# Patient Record
Sex: MALE | Race: WHITE | NOT HISPANIC OR LATINO | URBAN - METROPOLITAN AREA
[De-identification: names, ages, dates, MRNs, and addresses within clinical notes are randomized per-mention and may not be internally consistent; named-entity substitution may affect disease eponyms.]

---

## 2017-04-22 ENCOUNTER — EMERGENCY (EMERGENCY)
Facility: HOSPITAL | Age: 67
LOS: 1 days | Discharge: HOME | End: 2017-04-22
Admitting: EMERGENCY MEDICINE
Payer: COMMERCIAL

## 2017-04-22 PROCEDURE — 70450 CT HEAD/BRAIN W/O DYE: CPT

## 2017-04-22 PROCEDURE — 70450 CT HEAD/BRAIN W/O DYE: CPT | Mod: 26

## 2017-04-22 PROCEDURE — 99284 EMERGENCY DEPT VISIT MOD MDM: CPT | Mod: 25

## 2017-04-22 PROCEDURE — 73660 X-RAY EXAM OF TOE(S): CPT | Mod: 26,RT

## 2017-04-22 PROCEDURE — 99284 EMERGENCY DEPT VISIT MOD MDM: CPT

## 2017-04-22 PROCEDURE — 73080 X-RAY EXAM OF ELBOW: CPT | Mod: 26,LT

## 2017-04-22 PROCEDURE — 90471 IMMUNIZATION ADMIN: CPT

## 2017-04-22 PROCEDURE — 73080 X-RAY EXAM OF ELBOW: CPT

## 2017-04-22 PROCEDURE — 73660 X-RAY EXAM OF TOE(S): CPT

## 2017-04-24 ENCOUNTER — EMERGENCY (EMERGENCY)
Facility: HOSPITAL | Age: 67
LOS: 1 days | Discharge: HOME | End: 2017-04-24
Admitting: EMERGENCY MEDICINE
Payer: COMMERCIAL

## 2017-04-24 PROCEDURE — 93010 ELECTROCARDIOGRAM REPORT: CPT

## 2017-04-24 PROCEDURE — 99284 EMERGENCY DEPT VISIT MOD MDM: CPT | Mod: 25

## 2017-04-25 PROCEDURE — 80048 BASIC METABOLIC PNL TOTAL CA: CPT

## 2017-04-25 PROCEDURE — 96360 HYDRATION IV INFUSION INIT: CPT

## 2017-04-25 PROCEDURE — 93005 ELECTROCARDIOGRAM TRACING: CPT

## 2017-04-25 PROCEDURE — 99285 EMERGENCY DEPT VISIT HI MDM: CPT | Mod: 25

## 2017-04-25 PROCEDURE — 80307 DRUG TEST PRSMV CHEM ANLYZR: CPT

## 2017-04-25 PROCEDURE — 85027 COMPLETE CBC AUTOMATED: CPT

## 2018-02-14 ENCOUNTER — APPOINTMENT (OUTPATIENT)
Dept: ORTHOPEDIC SURGERY | Facility: CLINIC | Age: 68
End: 2018-02-14
Payer: MEDICARE

## 2018-02-14 PROCEDURE — 99203 OFFICE O/P NEW LOW 30 MIN: CPT

## 2018-02-15 ENCOUNTER — APPOINTMENT (OUTPATIENT)
Dept: MRI IMAGING | Facility: IMAGING CENTER | Age: 68
End: 2018-02-15
Payer: MEDICARE

## 2018-02-15 ENCOUNTER — OUTPATIENT (OUTPATIENT)
Dept: OUTPATIENT SERVICES | Facility: HOSPITAL | Age: 68
LOS: 1 days | End: 2018-02-15
Payer: MEDICARE

## 2018-02-15 DIAGNOSIS — Z00.00 ENCOUNTER FOR GENERAL ADULT MEDICAL EXAMINATION WITHOUT ABNORMAL FINDINGS: ICD-10-CM

## 2018-02-15 PROCEDURE — 72146 MRI CHEST SPINE W/O DYE: CPT | Mod: 26

## 2018-02-15 PROCEDURE — 72146 MRI CHEST SPINE W/O DYE: CPT

## 2018-02-15 PROCEDURE — 72141 MRI NECK SPINE W/O DYE: CPT | Mod: 26

## 2018-02-15 PROCEDURE — 72141 MRI NECK SPINE W/O DYE: CPT

## 2018-02-21 ENCOUNTER — APPOINTMENT (OUTPATIENT)
Dept: ORTHOPEDIC SURGERY | Facility: CLINIC | Age: 68
End: 2018-02-21
Payer: MEDICARE

## 2018-02-21 PROCEDURE — 99214 OFFICE O/P EST MOD 30 MIN: CPT

## 2018-02-28 ENCOUNTER — OUTPATIENT (OUTPATIENT)
Dept: OUTPATIENT SERVICES | Facility: HOSPITAL | Age: 68
LOS: 1 days | End: 2018-02-28
Payer: MEDICARE

## 2018-02-28 VITALS
RESPIRATION RATE: 16 BRPM | HEART RATE: 68 BPM | TEMPERATURE: 99 F | SYSTOLIC BLOOD PRESSURE: 122 MMHG | WEIGHT: 242.51 LBS | HEIGHT: 77 IN | OXYGEN SATURATION: 98 % | DIASTOLIC BLOOD PRESSURE: 72 MMHG

## 2018-02-28 DIAGNOSIS — Z98.890 OTHER SPECIFIED POSTPROCEDURAL STATES: Chronic | ICD-10-CM

## 2018-02-28 DIAGNOSIS — R06.09 OTHER FORMS OF DYSPNEA: ICD-10-CM

## 2018-02-28 DIAGNOSIS — M48.02 SPINAL STENOSIS, CERVICAL REGION: ICD-10-CM

## 2018-02-28 DIAGNOSIS — G47.33 OBSTRUCTIVE SLEEP APNEA (ADULT) (PEDIATRIC): ICD-10-CM

## 2018-02-28 LAB
ALBUMIN SERPL ELPH-MCNC: 4.3 G/DL — SIGNIFICANT CHANGE UP (ref 3.3–5)
ALP SERPL-CCNC: 88 U/L — SIGNIFICANT CHANGE UP (ref 40–120)
ALT FLD-CCNC: 14 U/L — SIGNIFICANT CHANGE UP (ref 4–41)
AST SERPL-CCNC: 20 U/L — SIGNIFICANT CHANGE UP (ref 4–40)
BILIRUB SERPL-MCNC: 1 MG/DL — SIGNIFICANT CHANGE UP (ref 0.2–1.2)
BLD GP AB SCN SERPL QL: NEGATIVE — SIGNIFICANT CHANGE UP
BUN SERPL-MCNC: 33 MG/DL — HIGH (ref 7–23)
CALCIUM SERPL-MCNC: 9.6 MG/DL — SIGNIFICANT CHANGE UP (ref 8.4–10.5)
CHLORIDE SERPL-SCNC: 92 MMOL/L — LOW (ref 98–107)
CO2 SERPL-SCNC: 23 MMOL/L — SIGNIFICANT CHANGE UP (ref 22–31)
CREAT SERPL-MCNC: 1.41 MG/DL — HIGH (ref 0.5–1.3)
GLUCOSE SERPL-MCNC: 86 MG/DL — SIGNIFICANT CHANGE UP (ref 70–99)
HCT VFR BLD CALC: 39.7 % — SIGNIFICANT CHANGE UP (ref 39–50)
HGB BLD-MCNC: 14.5 G/DL — SIGNIFICANT CHANGE UP (ref 13–17)
MCHC RBC-ENTMCNC: 36.2 PG — HIGH (ref 27–34)
MCHC RBC-ENTMCNC: 36.5 % — HIGH (ref 32–36)
MCV RBC AUTO: 99 FL — SIGNIFICANT CHANGE UP (ref 80–100)
NRBC # FLD: 0 — SIGNIFICANT CHANGE UP
PLATELET # BLD AUTO: 249 K/UL — SIGNIFICANT CHANGE UP (ref 150–400)
PMV BLD: 10.7 FL — SIGNIFICANT CHANGE UP (ref 7–13)
POTASSIUM SERPL-MCNC: 3.4 MMOL/L — LOW (ref 3.5–5.3)
POTASSIUM SERPL-SCNC: 3.4 MMOL/L — LOW (ref 3.5–5.3)
PROT SERPL-MCNC: 7.3 G/DL — SIGNIFICANT CHANGE UP (ref 6–8.3)
RBC # BLD: 4.01 M/UL — LOW (ref 4.2–5.8)
RBC # FLD: 13 % — SIGNIFICANT CHANGE UP (ref 10.3–14.5)
RH IG SCN BLD-IMP: POSITIVE — SIGNIFICANT CHANGE UP
SODIUM SERPL-SCNC: 134 MMOL/L — LOW (ref 135–145)
WBC # BLD: 12.05 K/UL — HIGH (ref 3.8–10.5)
WBC # FLD AUTO: 12.05 K/UL — HIGH (ref 3.8–10.5)

## 2018-02-28 PROCEDURE — 93010 ELECTROCARDIOGRAM REPORT: CPT

## 2018-02-28 NOTE — H&P PST ADULT - PSH
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

## 2018-02-28 NOTE — H&P PST ADULT - PROBLEM SELECTOR PLAN 2
Instructed pt. to notify his Cardiologist.  Pt. is already scheduled for a routine visit with his Cardiologist 3/2/18.  Instructed pt. that he will need cardiac clearance.  Contacted Dotty at the Cardiologist office and informed her that the visit will have to also be for cardiac clearance on 3/2/18.

## 2018-02-28 NOTE — H&P PST ADULT - PMH
Atrial fibrillation    Hyperlipidemia    Hypertension Atrial fibrillation    Cervical stenosis of spine    Hyperlipidemia    Hypertension

## 2018-02-28 NOTE — H&P PST ADULT - VISION (WITH CORRECTIVE LENSES IF THE PATIENT USUALLY WEARS THEM):
Normal vision: sees adequately in most situations; can see medication labels, newsprint/contact lens at this visit instructed to wear glasses

## 2018-02-28 NOTE — H&P PST ADULT - PROBLEM SELECTOR PLAN 1
Pt. is scheduled for a C4-6 cervical laminectomy and fusion 3/6/18.  Pt. had medical clearance today, 2/28/18.

## 2018-02-28 NOTE — H&P PST ADULT - MUSCULOSKELETAL COMMENTS
"I don't have any neck pain", "all of my pain is in the lumbar area but the stenosis in my neck could be contributing to the lower back pain"

## 2018-02-28 NOTE — H&P PST ADULT - NSANTHOSAYNRD_GEN_A_CORE
No. RUDDY screening performed.  STOP BANG Legend: 0-2 = LOW Risk; 3-4 = INTERMEDIATE Risk; 5-8 = HIGH Risk

## 2018-03-01 LAB — SPECIMEN SOURCE: SIGNIFICANT CHANGE UP

## 2018-03-03 LAB — BACTERIA NPH CULT: SIGNIFICANT CHANGE UP

## 2018-03-05 NOTE — ASU PATIENT PROFILE, ADULT - VISION (WITH CORRECTIVE LENSES IF THE PATIENT USUALLY WEARS THEM):
contact lens at this visit instructed to wear glasses/Normal vision: sees adequately in most situations; can see medication labels, newsprint

## 2018-03-06 ENCOUNTER — INPATIENT (INPATIENT)
Facility: HOSPITAL | Age: 68
LOS: 2 days | Discharge: HOME HEALTH | End: 2018-03-09
Attending: STUDENT IN AN ORGANIZED HEALTH CARE EDUCATION/TRAINING PROGRAM | Admitting: STUDENT IN AN ORGANIZED HEALTH CARE EDUCATION/TRAINING PROGRAM
Payer: MEDICARE

## 2018-03-06 ENCOUNTER — TRANSCRIPTION ENCOUNTER (OUTPATIENT)
Age: 68
End: 2018-03-06

## 2018-03-06 ENCOUNTER — APPOINTMENT (OUTPATIENT)
Dept: ORTHOPEDIC SURGERY | Facility: HOSPITAL | Age: 68
End: 2018-03-06
Payer: MEDICARE

## 2018-03-06 VITALS
TEMPERATURE: 99 F | DIASTOLIC BLOOD PRESSURE: 69 MMHG | RESPIRATION RATE: 18 BRPM | HEIGHT: 77 IN | WEIGHT: 242.51 LBS | SYSTOLIC BLOOD PRESSURE: 130 MMHG | OXYGEN SATURATION: 97 % | HEART RATE: 74 BPM

## 2018-03-06 DIAGNOSIS — Z98.890 OTHER SPECIFIED POSTPROCEDURAL STATES: Chronic | ICD-10-CM

## 2018-03-06 DIAGNOSIS — M48.02 SPINAL STENOSIS, CERVICAL REGION: ICD-10-CM

## 2018-03-06 LAB
BASOPHILS # BLD AUTO: 0.03 K/UL — SIGNIFICANT CHANGE UP (ref 0–0.2)
BASOPHILS NFR BLD AUTO: 0.2 % — SIGNIFICANT CHANGE UP (ref 0–2)
BUN SERPL-MCNC: 24 MG/DL — HIGH (ref 7–23)
CALCIUM SERPL-MCNC: 9.1 MG/DL — SIGNIFICANT CHANGE UP (ref 8.4–10.5)
CHLORIDE SERPL-SCNC: 93 MMOL/L — LOW (ref 98–107)
CO2 SERPL-SCNC: 23 MMOL/L — SIGNIFICANT CHANGE UP (ref 22–31)
CREAT SERPL-MCNC: 1.4 MG/DL — HIGH (ref 0.5–1.3)
EOSINOPHIL # BLD AUTO: 0.01 K/UL — SIGNIFICANT CHANGE UP (ref 0–0.5)
EOSINOPHIL NFR BLD AUTO: 0.1 % — SIGNIFICANT CHANGE UP (ref 0–6)
GLUCOSE SERPL-MCNC: 127 MG/DL — HIGH (ref 70–99)
HCT VFR BLD CALC: 37.1 % — LOW (ref 39–50)
HGB BLD-MCNC: 13.1 G/DL — SIGNIFICANT CHANGE UP (ref 13–17)
IMM GRANULOCYTES # BLD AUTO: 0.15 # — SIGNIFICANT CHANGE UP
IMM GRANULOCYTES NFR BLD AUTO: 1.2 % — SIGNIFICANT CHANGE UP (ref 0–1.5)
LYMPHOCYTES # BLD AUTO: 0.76 K/UL — LOW (ref 1–3.3)
LYMPHOCYTES # BLD AUTO: 5.9 % — LOW (ref 13–44)
MCHC RBC-ENTMCNC: 34.9 PG — HIGH (ref 27–34)
MCHC RBC-ENTMCNC: 35.3 % — SIGNIFICANT CHANGE UP (ref 32–36)
MCV RBC AUTO: 98.9 FL — SIGNIFICANT CHANGE UP (ref 80–100)
MONOCYTES # BLD AUTO: 0.69 K/UL — SIGNIFICANT CHANGE UP (ref 0–0.9)
MONOCYTES NFR BLD AUTO: 5.3 % — SIGNIFICANT CHANGE UP (ref 2–14)
NEUTROPHILS # BLD AUTO: 11.3 K/UL — HIGH (ref 1.8–7.4)
NEUTROPHILS NFR BLD AUTO: 87.3 % — HIGH (ref 43–77)
NRBC # FLD: 0 — SIGNIFICANT CHANGE UP
PLATELET # BLD AUTO: 205 K/UL — SIGNIFICANT CHANGE UP (ref 150–400)
PMV BLD: 10.5 FL — SIGNIFICANT CHANGE UP (ref 7–13)
POTASSIUM SERPL-MCNC: 3.8 MMOL/L — SIGNIFICANT CHANGE UP (ref 3.5–5.3)
POTASSIUM SERPL-SCNC: 3.8 MMOL/L — SIGNIFICANT CHANGE UP (ref 3.5–5.3)
RBC # BLD: 3.75 M/UL — LOW (ref 4.2–5.8)
RBC # FLD: 12.7 % — SIGNIFICANT CHANGE UP (ref 10.3–14.5)
RH IG SCN BLD-IMP: POSITIVE — SIGNIFICANT CHANGE UP
SODIUM SERPL-SCNC: 134 MMOL/L — LOW (ref 135–145)
WBC # BLD: 12.94 K/UL — HIGH (ref 3.8–10.5)
WBC # FLD AUTO: 12.94 K/UL — HIGH (ref 3.8–10.5)

## 2018-03-06 PROCEDURE — 63045 LAM FACETEC & FORAMOT CRV: CPT | Mod: 82

## 2018-03-06 PROCEDURE — 22600 ARTHRD PST TQ 1NTRSPC CRV: CPT | Mod: 82

## 2018-03-06 PROCEDURE — ZZZZZ: CPT

## 2018-03-06 PROCEDURE — 22600 ARTHRD PST TQ 1NTRSPC CRV: CPT

## 2018-03-06 PROCEDURE — 22842 INSERT SPINE FIXATION DEVICE: CPT

## 2018-03-06 PROCEDURE — 22842 INSERT SPINE FIXATION DEVICE: CPT | Mod: 82

## 2018-03-06 PROCEDURE — 63048 LAM FACETEC &FORAMOT EA ADDL: CPT | Mod: 82

## 2018-03-06 PROCEDURE — 63045 LAM FACETEC & FORAMOT CRV: CPT

## 2018-03-06 PROCEDURE — 22614 ARTHRD PST TQ 1NTRSPC EA ADD: CPT | Mod: 82

## 2018-03-06 PROCEDURE — 63048 LAM FACETEC &FORAMOT EA ADDL: CPT

## 2018-03-06 PROCEDURE — 22614 ARTHRD PST TQ 1NTRSPC EA ADD: CPT

## 2018-03-06 RX ORDER — FOLIC ACID 0.8 MG
1 TABLET ORAL DAILY
Qty: 0 | Refills: 0 | Status: DISCONTINUED | OUTPATIENT
Start: 2018-03-06 | End: 2018-03-09

## 2018-03-06 RX ORDER — ONDANSETRON 8 MG/1
4 TABLET, FILM COATED ORAL EVERY 6 HOURS
Qty: 0 | Refills: 0 | Status: DISCONTINUED | OUTPATIENT
Start: 2018-03-06 | End: 2018-03-09

## 2018-03-06 RX ORDER — CHLORTHALIDONE 50 MG
25 TABLET ORAL DAILY
Qty: 0 | Refills: 0 | Status: DISCONTINUED | OUTPATIENT
Start: 2018-03-06 | End: 2018-03-09

## 2018-03-06 RX ORDER — LISINOPRIL 2.5 MG/1
40 TABLET ORAL DAILY
Qty: 0 | Refills: 0 | Status: DISCONTINUED | OUTPATIENT
Start: 2018-03-06 | End: 2018-03-09

## 2018-03-06 RX ORDER — HYDROMORPHONE HYDROCHLORIDE 2 MG/ML
0.5 INJECTION INTRAMUSCULAR; INTRAVENOUS; SUBCUTANEOUS
Qty: 0 | Refills: 0 | Status: DISCONTINUED | OUTPATIENT
Start: 2018-03-06 | End: 2018-03-07

## 2018-03-06 RX ORDER — CEFAZOLIN SODIUM 1 G
2000 VIAL (EA) INJECTION EVERY 8 HOURS
Qty: 0 | Refills: 0 | Status: COMPLETED | OUTPATIENT
Start: 2018-03-06 | End: 2018-03-07

## 2018-03-06 RX ORDER — MAGNESIUM HYDROXIDE 400 MG/1
30 TABLET, CHEWABLE ORAL EVERY 12 HOURS
Qty: 0 | Refills: 0 | Status: DISCONTINUED | OUTPATIENT
Start: 2018-03-06 | End: 2018-03-09

## 2018-03-06 RX ORDER — SODIUM CHLORIDE 9 MG/ML
1000 INJECTION, SOLUTION INTRAVENOUS
Qty: 0 | Refills: 0 | Status: DISCONTINUED | OUTPATIENT
Start: 2018-03-06 | End: 2018-03-08

## 2018-03-06 RX ORDER — OXYCODONE HYDROCHLORIDE 5 MG/1
5 TABLET ORAL EVERY 4 HOURS
Qty: 0 | Refills: 0 | Status: DISCONTINUED | OUTPATIENT
Start: 2018-03-06 | End: 2018-03-07

## 2018-03-06 RX ORDER — ONDANSETRON 8 MG/1
4 TABLET, FILM COATED ORAL ONCE
Qty: 0 | Refills: 0 | Status: DISCONTINUED | OUTPATIENT
Start: 2018-03-06 | End: 2018-03-07

## 2018-03-06 RX ORDER — AMLODIPINE BESYLATE 2.5 MG/1
10 TABLET ORAL DAILY
Qty: 0 | Refills: 0 | Status: DISCONTINUED | OUTPATIENT
Start: 2018-03-06 | End: 2018-03-09

## 2018-03-06 RX ORDER — FAMOTIDINE 10 MG/ML
20 INJECTION INTRAVENOUS EVERY 24 HOURS
Qty: 0 | Refills: 0 | Status: DISCONTINUED | OUTPATIENT
Start: 2018-03-06 | End: 2018-03-09

## 2018-03-06 RX ORDER — DRONEDARONE 400 MG/1
400 TABLET, FILM COATED ORAL
Qty: 0 | Refills: 0 | Status: DISCONTINUED | OUTPATIENT
Start: 2018-03-06 | End: 2018-03-09

## 2018-03-06 RX ORDER — HYDROMORPHONE HYDROCHLORIDE 2 MG/ML
30 INJECTION INTRAMUSCULAR; INTRAVENOUS; SUBCUTANEOUS
Qty: 0 | Refills: 0 | Status: DISCONTINUED | OUTPATIENT
Start: 2018-03-06 | End: 2018-03-07

## 2018-03-06 RX ORDER — NALOXONE HYDROCHLORIDE 4 MG/.1ML
0.1 SPRAY NASAL
Qty: 0 | Refills: 0 | Status: DISCONTINUED | OUTPATIENT
Start: 2018-03-06 | End: 2018-03-09

## 2018-03-06 RX ORDER — DIAZEPAM 5 MG
5 TABLET ORAL ONCE
Qty: 0 | Refills: 0 | Status: DISCONTINUED | OUTPATIENT
Start: 2018-03-06 | End: 2018-03-06

## 2018-03-06 RX ORDER — ACETAMINOPHEN 500 MG
650 TABLET ORAL EVERY 6 HOURS
Qty: 0 | Refills: 0 | Status: DISCONTINUED | OUTPATIENT
Start: 2018-03-06 | End: 2018-03-09

## 2018-03-06 RX ORDER — SENNA PLUS 8.6 MG/1
2 TABLET ORAL AT BEDTIME
Qty: 0 | Refills: 0 | Status: DISCONTINUED | OUTPATIENT
Start: 2018-03-06 | End: 2018-03-09

## 2018-03-06 RX ORDER — ACETAMINOPHEN 500 MG
650 TABLET ORAL EVERY 6 HOURS
Qty: 0 | Refills: 0 | Status: DISCONTINUED | OUTPATIENT
Start: 2018-03-06 | End: 2018-03-07

## 2018-03-06 RX ORDER — DIPHENHYDRAMINE HCL 50 MG
25 CAPSULE ORAL EVERY 4 HOURS
Qty: 0 | Refills: 0 | Status: DISCONTINUED | OUTPATIENT
Start: 2018-03-06 | End: 2018-03-07

## 2018-03-06 RX ORDER — DOCUSATE SODIUM 100 MG
100 CAPSULE ORAL THREE TIMES A DAY
Qty: 0 | Refills: 0 | Status: DISCONTINUED | OUTPATIENT
Start: 2018-03-06 | End: 2018-03-09

## 2018-03-06 RX ORDER — OXYCODONE HYDROCHLORIDE 5 MG/1
10 TABLET ORAL EVERY 4 HOURS
Qty: 0 | Refills: 0 | Status: DISCONTINUED | OUTPATIENT
Start: 2018-03-06 | End: 2018-03-07

## 2018-03-06 RX ORDER — METOPROLOL TARTRATE 50 MG
50 TABLET ORAL
Qty: 0 | Refills: 0 | Status: DISCONTINUED | OUTPATIENT
Start: 2018-03-06 | End: 2018-03-09

## 2018-03-06 RX ADMIN — HYDROMORPHONE HYDROCHLORIDE 0.5 MILLIGRAM(S): 2 INJECTION INTRAMUSCULAR; INTRAVENOUS; SUBCUTANEOUS at 16:20

## 2018-03-06 RX ADMIN — HYDROMORPHONE HYDROCHLORIDE 0.5 MILLIGRAM(S): 2 INJECTION INTRAMUSCULAR; INTRAVENOUS; SUBCUTANEOUS at 16:55

## 2018-03-06 RX ADMIN — HYDROMORPHONE HYDROCHLORIDE 30 MILLILITER(S): 2 INJECTION INTRAMUSCULAR; INTRAVENOUS; SUBCUTANEOUS at 23:20

## 2018-03-06 RX ADMIN — HYDROMORPHONE HYDROCHLORIDE 0.5 MILLIGRAM(S): 2 INJECTION INTRAMUSCULAR; INTRAVENOUS; SUBCUTANEOUS at 18:45

## 2018-03-06 RX ADMIN — OXYCODONE HYDROCHLORIDE 10 MILLIGRAM(S): 5 TABLET ORAL at 22:15

## 2018-03-06 RX ADMIN — Medication 1 TABLET(S): at 21:45

## 2018-03-06 RX ADMIN — SODIUM CHLORIDE 75 MILLILITER(S): 9 INJECTION, SOLUTION INTRAVENOUS at 16:21

## 2018-03-06 RX ADMIN — Medication 100 MILLIGRAM(S): at 23:25

## 2018-03-06 RX ADMIN — HYDROMORPHONE HYDROCHLORIDE 0.5 MILLIGRAM(S): 2 INJECTION INTRAMUSCULAR; INTRAVENOUS; SUBCUTANEOUS at 16:35

## 2018-03-06 RX ADMIN — HYDROMORPHONE HYDROCHLORIDE 0.5 MILLIGRAM(S): 2 INJECTION INTRAMUSCULAR; INTRAVENOUS; SUBCUTANEOUS at 18:00

## 2018-03-06 RX ADMIN — DRONEDARONE 400 MILLIGRAM(S): 400 TABLET, FILM COATED ORAL at 21:45

## 2018-03-06 RX ADMIN — Medication 5 MILLIGRAM(S): at 17:20

## 2018-03-06 RX ADMIN — HYDROMORPHONE HYDROCHLORIDE 0.5 MILLIGRAM(S): 2 INJECTION INTRAMUSCULAR; INTRAVENOUS; SUBCUTANEOUS at 18:15

## 2018-03-06 RX ADMIN — HYDROMORPHONE HYDROCHLORIDE 0.5 MILLIGRAM(S): 2 INJECTION INTRAMUSCULAR; INTRAVENOUS; SUBCUTANEOUS at 18:30

## 2018-03-06 RX ADMIN — Medication 50 MILLIGRAM(S): at 20:06

## 2018-03-06 RX ADMIN — HYDROMORPHONE HYDROCHLORIDE 0.5 MILLIGRAM(S): 2 INJECTION INTRAMUSCULAR; INTRAVENOUS; SUBCUTANEOUS at 16:40

## 2018-03-06 RX ADMIN — OXYCODONE HYDROCHLORIDE 10 MILLIGRAM(S): 5 TABLET ORAL at 21:45

## 2018-03-06 NOTE — BRIEF OPERATIVE NOTE - PROCEDURE
<<-----Click on this checkbox to enter Procedure Cervical arthrodesis by posterior technique  03/06/2018    Active  [id number]

## 2018-03-06 NOTE — PROGRESS NOTE ADULT - SUBJECTIVE AND OBJECTIVE BOX
Patient appears well recovered from anesthesia. Pain well controlled with pain medications.     Vital Signs Last 24 Hrs  T(C): 36.6 (06 Mar 2018 19:10), Max: 37 (06 Mar 2018 10:43)  T(F): 97.8 (06 Mar 2018 19:10), Max: 98.6 (06 Mar 2018 10:43)  HR: 82 (06 Mar 2018 22:00) (74 - 96)  BP: 119/59 (06 Mar 2018 22:00) (119/59 - 146/87)  BP(mean): 71 (06 Mar 2018 22:00) (71 - 75)  RR: 14 (06 Mar 2018 22:00) (10 - 19)  SpO2: 100% (06 Mar 2018 22:00) (97% - 100%)    Exam:  Gen: NAD  Motor: 5/5 AIN/PIN/Median/Radial/Ulnar nerve distributions  Sensory: SILT Median/Radial/Ulnar nerve distributions  Vascular: 2+ Radial pulse  Dressing: Clean, Dry, Intact    A/P: 67 year old male s/p C3-6 PSF  - Pain Control  - Regular Diet  - PT/OT, OOB  - Monitor HV

## 2018-03-07 ENCOUNTER — TRANSCRIPTION ENCOUNTER (OUTPATIENT)
Age: 68
End: 2018-03-07

## 2018-03-07 LAB
BASOPHILS # BLD AUTO: 0.01 K/UL — SIGNIFICANT CHANGE UP (ref 0–0.2)
BASOPHILS NFR BLD AUTO: 0.1 % — SIGNIFICANT CHANGE UP (ref 0–2)
BUN SERPL-MCNC: 18 MG/DL — SIGNIFICANT CHANGE UP (ref 7–23)
CALCIUM SERPL-MCNC: 9.1 MG/DL — SIGNIFICANT CHANGE UP (ref 8.4–10.5)
CHLORIDE SERPL-SCNC: 92 MMOL/L — LOW (ref 98–107)
CO2 SERPL-SCNC: 27 MMOL/L — SIGNIFICANT CHANGE UP (ref 22–31)
CREAT SERPL-MCNC: 0.93 MG/DL — SIGNIFICANT CHANGE UP (ref 0.5–1.3)
EOSINOPHIL # BLD AUTO: 0 K/UL — SIGNIFICANT CHANGE UP (ref 0–0.5)
EOSINOPHIL NFR BLD AUTO: 0 % — SIGNIFICANT CHANGE UP (ref 0–6)
GLUCOSE SERPL-MCNC: 168 MG/DL — HIGH (ref 70–99)
HCT VFR BLD CALC: 36.6 % — LOW (ref 39–50)
HGB BLD-MCNC: 12.9 G/DL — LOW (ref 13–17)
IMM GRANULOCYTES # BLD AUTO: 0.11 # — SIGNIFICANT CHANGE UP
IMM GRANULOCYTES NFR BLD AUTO: 0.8 % — SIGNIFICANT CHANGE UP (ref 0–1.5)
LYMPHOCYTES # BLD AUTO: 0.48 K/UL — LOW (ref 1–3.3)
LYMPHOCYTES # BLD AUTO: 3.5 % — LOW (ref 13–44)
MCHC RBC-ENTMCNC: 34.9 PG — HIGH (ref 27–34)
MCHC RBC-ENTMCNC: 35.2 % — SIGNIFICANT CHANGE UP (ref 32–36)
MCV RBC AUTO: 98.9 FL — SIGNIFICANT CHANGE UP (ref 80–100)
MONOCYTES # BLD AUTO: 1.11 K/UL — HIGH (ref 0–0.9)
MONOCYTES NFR BLD AUTO: 8.2 % — SIGNIFICANT CHANGE UP (ref 2–14)
NEUTROPHILS # BLD AUTO: 11.89 K/UL — HIGH (ref 1.8–7.4)
NEUTROPHILS NFR BLD AUTO: 87.4 % — HIGH (ref 43–77)
NRBC # FLD: 0 — SIGNIFICANT CHANGE UP
PLATELET # BLD AUTO: 186 K/UL — SIGNIFICANT CHANGE UP (ref 150–400)
PMV BLD: 10.5 FL — SIGNIFICANT CHANGE UP (ref 7–13)
POTASSIUM SERPL-MCNC: 3.7 MMOL/L — SIGNIFICANT CHANGE UP (ref 3.5–5.3)
POTASSIUM SERPL-SCNC: 3.7 MMOL/L — SIGNIFICANT CHANGE UP (ref 3.5–5.3)
RBC # BLD: 3.7 M/UL — LOW (ref 4.2–5.8)
RBC # FLD: 12.6 % — SIGNIFICANT CHANGE UP (ref 10.3–14.5)
SODIUM SERPL-SCNC: 134 MMOL/L — LOW (ref 135–145)
WBC # BLD: 13.6 K/UL — HIGH (ref 3.8–10.5)
WBC # FLD AUTO: 13.6 K/UL — HIGH (ref 3.8–10.5)

## 2018-03-07 RX ORDER — OXYCODONE HYDROCHLORIDE 5 MG/1
10 TABLET ORAL
Qty: 0 | Refills: 0 | Status: DISCONTINUED | OUTPATIENT
Start: 2018-03-07 | End: 2018-03-09

## 2018-03-07 RX ORDER — ACETAMINOPHEN 500 MG
650 TABLET ORAL EVERY 6 HOURS
Qty: 0 | Refills: 0 | Status: COMPLETED | OUTPATIENT
Start: 2018-03-07 | End: 2018-03-09

## 2018-03-07 RX ORDER — DIAZEPAM 5 MG
5 TABLET ORAL EVERY 6 HOURS
Qty: 0 | Refills: 0 | Status: DISCONTINUED | OUTPATIENT
Start: 2018-03-07 | End: 2018-03-09

## 2018-03-07 RX ORDER — OXYCODONE HYDROCHLORIDE 5 MG/1
5 TABLET ORAL
Qty: 0 | Refills: 0 | Status: DISCONTINUED | OUTPATIENT
Start: 2018-03-07 | End: 2018-03-09

## 2018-03-07 RX ADMIN — OXYCODONE HYDROCHLORIDE 10 MILLIGRAM(S): 5 TABLET ORAL at 19:25

## 2018-03-07 RX ADMIN — Medication 650 MILLIGRAM(S): at 12:30

## 2018-03-07 RX ADMIN — AMLODIPINE BESYLATE 10 MILLIGRAM(S): 2.5 TABLET ORAL at 06:01

## 2018-03-07 RX ADMIN — Medication 1 TABLET(S): at 14:18

## 2018-03-07 RX ADMIN — Medication 100 MILLIGRAM(S): at 21:25

## 2018-03-07 RX ADMIN — Medication 1 TABLET(S): at 21:25

## 2018-03-07 RX ADMIN — LISINOPRIL 40 MILLIGRAM(S): 2.5 TABLET ORAL at 08:34

## 2018-03-07 RX ADMIN — ONDANSETRON 4 MILLIGRAM(S): 8 TABLET, FILM COATED ORAL at 12:22

## 2018-03-07 RX ADMIN — OXYCODONE HYDROCHLORIDE 10 MILLIGRAM(S): 5 TABLET ORAL at 16:00

## 2018-03-07 RX ADMIN — Medication 100 MILLIGRAM(S): at 06:02

## 2018-03-07 RX ADMIN — SENNA PLUS 2 TABLET(S): 8.6 TABLET ORAL at 21:25

## 2018-03-07 RX ADMIN — Medication 650 MILLIGRAM(S): at 17:09

## 2018-03-07 RX ADMIN — Medication 1 TABLET(S): at 06:01

## 2018-03-07 RX ADMIN — Medication 100 MILLIGRAM(S): at 06:31

## 2018-03-07 RX ADMIN — OXYCODONE HYDROCHLORIDE 10 MILLIGRAM(S): 5 TABLET ORAL at 15:15

## 2018-03-07 RX ADMIN — OXYCODONE HYDROCHLORIDE 10 MILLIGRAM(S): 5 TABLET ORAL at 20:05

## 2018-03-07 RX ADMIN — Medication 50 MILLIGRAM(S): at 08:35

## 2018-03-07 RX ADMIN — HYDROMORPHONE HYDROCHLORIDE 0.5 MILLIGRAM(S): 2 INJECTION INTRAMUSCULAR; INTRAVENOUS; SUBCUTANEOUS at 04:48

## 2018-03-07 RX ADMIN — Medication 5 MILLIGRAM(S): at 17:09

## 2018-03-07 RX ADMIN — Medication 650 MILLIGRAM(S): at 11:57

## 2018-03-07 RX ADMIN — ONDANSETRON 4 MILLIGRAM(S): 8 TABLET, FILM COATED ORAL at 06:37

## 2018-03-07 RX ADMIN — Medication 100 MILLIGRAM(S): at 14:19

## 2018-03-07 NOTE — DISCHARGE NOTE ADULT - HOME CARE AGENCY
[facility]  [phone] .   Nurse to call and visit day after discharge 3/10/2018

## 2018-03-07 NOTE — PHYSICAL THERAPY INITIAL EVALUATION ADULT - ADDITIONAL COMMENTS
Pt reports that he [place] in a [place] with wife with ~3STE through garage, and ~12 steps to negotiate to bedroom. Prior to hospital admission pt was completely independent and used a quad cane with ambulation. Pt reports a few recent falls with his last fall last month.    Pt left comfortable in bed, NAD, all lines intact, all precautions maintained, with call bell in reach, and RN aware of PT evaluation. Pt reports that he lives in a high level ranch house with wife with ~3STE through garage, and ~12 steps to negotiate to bedroom. Prior to hospital admission pt was completely independent and used a quad cane with ambulation. Pt reports a few recent falls with his last fall last month.    Pt left comfortable in bed, NAD, all lines intact, all precautions maintained, with call bell in reach, and RN aware of PT evaluation.

## 2018-03-07 NOTE — DISCHARGE NOTE ADULT - INSTRUCTIONS
Make a follow up appointment with Dr. Gaxiola. Call MD if you develop a fever, or if there is redness, swelling, drainage or pain not relieved by pain medication. No heavy lifting, bending, or straining to move your bowels. Take over the counter stool softeners as needed to prevent constipation which may be caused by pain medication.

## 2018-03-07 NOTE — DISCHARGE NOTE ADULT - MEDICATION SUMMARY - MEDICATIONS TO TAKE
I will START or STAY ON the medications listed below when I get home from the hospital:    oxyCODONE 5 mg oral tablet  -- 1 -2 tab(s) by mouth every 3 hours, As needed, Moderate Pain (4 - 6). MDD:8  -- Indication: For pain    benazepril 40 mg oral tablet  -- 1 tab(s) by mouth once a day in am  -- Indication: For Home med    Zetia 10 mg oral tablet  -- 1 tab(s) by mouth 5 times a week  takes Sun/ Mon/ Tuesday/ Thurs/Friday  Skips Wednesday & Saturday  -- Indication: For Chol    metoprolol tartrate 50 mg oral tablet  -- 1 tab(s) by mouth 2 times a day  -- Indication: For Home med    amLODIPine 10 mg oral tablet  -- 1 tab(s) by mouth once a day  -- Indication: For Home med    chlorthalidone 25 mg oral tablet  -- 1 tab(s) by mouth once a day  -- Indication: For Home med    Glucosamine & Chondroitin with MSM oral tablet  -- 2 tab(s) by mouth once a day last dose as of 2/28/18  -- Indication: For Home med    Ocuvite oral tablet  -- 1 tab(s) by mouth once a day last dose on 2/28/18  -- Indication: For Home med    Vitamin D3 2000 intl units oral tablet  -- 1 tab(s) by mouth once a day in am  -- Indication: For vit

## 2018-03-07 NOTE — DISCHARGE NOTE ADULT - NS AS DC FOLLOWUP STROKE INST
Smoking Cessation/Influenza vaccination (VIS Pub Date: August 7, 2015)/cervical laminectomy, pain meds. cervical laminectomy, pain meds.

## 2018-03-07 NOTE — DISCHARGE NOTE ADULT - PATIENT PORTAL LINK FT
You can access the [facility] Portal, offered by [facility], by registering with the following website: [email or web address]

## 2018-03-07 NOTE — PROGRESS NOTE ADULT - SUBJECTIVE AND OBJECTIVE BOX
ORTHO PROGRESS NOTE     Patient resting comfortably without complaint  Patient notes improvement in lower extremity pain      Vital Signs Last 24 Hrs  T(C): 36.4 (07 Mar 2018 06:00), Max: 37 (06 Mar 2018 10:43)  T(F): 97.6 (07 Mar 2018 06:00), Max: 98.6 (06 Mar 2018 10:43)  HR: 86 (07 Mar 2018 06:00) (74 - 96)  BP: 119/76 (07 Mar 2018 06:00) (112/66 - 146/87)  BP(mean): 84 (07 Mar 2018 03:00) (71 - 84)  RR: 12 (07 Mar 2018 06:00) (10 - 19)  SpO2: 99% (07 Mar 2018 06:00) (97% - 100%)      Neck: Dressing clean/ dry/intact              HV drain: 17/77            Aspen collar in place  	    UE: Deltoids: 5/5         Biceps: 5/5         Triceps: 5/5          Wrist ext: 5/5         : 5/5

## 2018-03-07 NOTE — DISCHARGE NOTE ADULT - CARE PLAN
Principal Discharge DX:	Cervical stenosis of spine  Goal:	improvement of function  Assessment and plan of treatment:	weight bear as tolerated   resume same diet as prior to surgery   Dr Gaxiola 1 week call for appt [phone]

## 2018-03-07 NOTE — PROGRESS NOTE ADULT - SUBJECTIVE AND OBJECTIVE BOX
Anesthesia Pain Management Service- Attending Addendum    SUBJECTIVE: Pt doing well with IV PCA without problems reported.    Therapy:	  [ X] IV PCA	   [ ] Epidural           [ ] s/p Spinal Opoid              [ ] Postpartum infusion	  [ ] Patient controlled regional anesthesia (PCRA)    [ ] prn Analgesics    Allergies    No Known Allergies    Intolerances      MEDICATIONS  (STANDING):  acetaminophen   Tablet. 650 milliGRAM(s) Oral every 6 hours  amLODIPine   Tablet 10 milliGRAM(s) Oral daily  calcium carbonate 1250 mG + Vitamin D (OsCal 500 + D) 1 Tablet(s) Oral three times a day  chlorthalidone 25 milliGRAM(s) Oral daily  docusate sodium 100 milliGRAM(s) Oral three times a day  dronedarone 400 milliGRAM(s) Oral two times a day  folic acid 1 milliGRAM(s) Oral daily  lactated ringers. 1000 milliLiter(s) (75 mL/Hr) IV Continuous <Continuous>  lisinopril 40 milliGRAM(s) Oral daily  metoprolol     tartrate 50 milliGRAM(s) Oral two times a day  multivitamin 1 Tablet(s) Oral daily  senna 2 Tablet(s) Oral at bedtime    MEDICATIONS  (PRN):  acetaminophen   Tablet 650 milliGRAM(s) Oral every 6 hours PRN For Temp greater than 38.5 C (101.3 F)  aluminum hydroxide/magnesium hydroxide/simethicone Suspension 30 milliLiter(s) Oral every 12 hours PRN Indigestion  diazepam    Tablet 5 milliGRAM(s) Oral every 6 hours PRN spasms  famotidine    Tablet 20 milliGRAM(s) Oral every 24 hours PRN Dyspepsia  magnesium hydroxide Suspension 30 milliLiter(s) Oral every 12 hours PRN Constipation  naloxone Injectable 0.1 milliGRAM(s) IV Push every 3 minutes PRN For ANY of the following changes in patient status:  A. RR LESS THAN 10 breaths per minute, B. Oxygen saturation LESS THAN 90%, C. Sedation score of 6  ondansetron Injectable 4 milliGRAM(s) IV Push every 6 hours PRN Nausea  ondansetron Injectable 4 milliGRAM(s) IV Push every 6 hours PRN Nausea  oxyCODONE    IR 5 milliGRAM(s) Oral every 3 hours PRN Moderate Pain (4 - 6)  oxyCODONE    IR 10 milliGRAM(s) Oral every 3 hours PRN Severe Pain (7 - 10)      OBJECTIVE:   [X] No new signs     [ ] Other:    Side Effects:  [X ] None			[ ] Other:    Assessment of Catheter Site:		[ ] Intact		[ ] Other:    ASSESSMENT/PLAN  [ X] Continue current therapy    [ ] Therapy changed to:    [ ] IV PCA       [ ] Epidural     [ ] prn Analgesics     Comments:

## 2018-03-07 NOTE — PHYSICAL THERAPY INITIAL EVALUATION ADULT - GENERAL OBSERVATIONS, REHAB EVAL
Pt encountered in semisupine position, no distress, AxOx4, with +IV, +guerrero, +hemovac, C/S dressing dry/intact, and cervical hard collar. Pt agreeable to PT consult

## 2018-03-07 NOTE — DISCHARGE NOTE ADULT - PLAN OF CARE
improvement of function weight bear as tolerated   resume same diet as prior to surgery   Dr Gaxiola 1 week call for appt [phone]

## 2018-03-07 NOTE — DISCHARGE NOTE ADULT - CONDITIONS AT DISCHARGE
stable stable. Pt. is afebrile and offers no complaints. In no acute distress. Posterior neck dressing with cervial collar: clean, dry and intact. Pt is ambulating with a cane,  tolerating diet well, and voiding in adequate amounts.

## 2018-03-07 NOTE — PROGRESS NOTE ADULT - SUBJECTIVE AND OBJECTIVE BOX
Day _2_ of Anesthesia Pain Management Service    Allergies  No Known Allergies      SUBJECTIVE: "I'm doing ok. I was nauseous this morning, but I just had some water and toast."    Pain Scale Score	At rest: _2/10_ 	With Activity: ___ 	[ ] Refer to charted pain scores    THERAPY:    [ ] IV PCA Morphine		[ ] 5 mg/mL	[ ] 1 mg/mL  [X] IV PCA Hydromorphone	[ ] 5 mg/mL	[X] 1 mg/mL  [ ] IV PCA Fentanyl		[ ] 50 micrograms/mL    Demand dose _0.2mg_ lockout _6_ (minutes) Continuous Rate _0_ Total: _4mg_  Daily      MEDICATIONS  (STANDING):  acetaminophen   Tablet. 650 milliGRAM(s) Oral every 6 hours  amLODIPine   Tablet 10 milliGRAM(s) Oral daily  calcium carbonate 1250 mG + Vitamin D (OsCal 500 + D) 1 Tablet(s) Oral three times a day  chlorthalidone 25 milliGRAM(s) Oral daily  docusate sodium 100 milliGRAM(s) Oral three times a day  dronedarone 400 milliGRAM(s) Oral two times a day  folic acid 1 milliGRAM(s) Oral daily  lactated ringers. 1000 milliLiter(s) (75 mL/Hr) IV Continuous <Continuous>  lisinopril 40 milliGRAM(s) Oral daily  metoprolol     tartrate 50 milliGRAM(s) Oral two times a day  multivitamin 1 Tablet(s) Oral daily  senna 2 Tablet(s) Oral at bedtime    MEDICATIONS  (PRN):  acetaminophen   Tablet 650 milliGRAM(s) Oral every 6 hours PRN For Temp greater than 38.5 C (101.3 F)  aluminum hydroxide/magnesium hydroxide/simethicone Suspension 30 milliLiter(s) Oral every 12 hours PRN Indigestion  famotidine    Tablet 20 milliGRAM(s) Oral every 24 hours PRN Dyspepsia  HYDROmorphone  Injectable 0.5 milliGRAM(s) IV Push every 10 minutes PRN Moderate Pain (4 - 6)  magnesium hydroxide Suspension 30 milliLiter(s) Oral every 12 hours PRN Constipation  naloxone Injectable 0.1 milliGRAM(s) IV Push every 3 minutes PRN For ANY of the following changes in patient status:  A. RR LESS THAN 10 breaths per minute, B. Oxygen saturation LESS THAN 90%, C. Sedation score of 6  ondansetron Injectable 4 milliGRAM(s) IV Push every 6 hours PRN Nausea  ondansetron Injectable 4 milliGRAM(s) IV Push every 6 hours PRN Nausea  ondansetron Injectable 4 milliGRAM(s) IV Push once PRN Nausea and/or Vomiting  oxyCODONE    IR 5 milliGRAM(s) Oral every 3 hours PRN Moderate Pain (4 - 6)  oxyCODONE    IR 10 milliGRAM(s) Oral every 3 hours PRN Severe Pain (7 - 10)      OBJECTIVE: A&Ox3, NAD, sitting up in bed with c-collar inplace    Sedation Score:	[X] Alert	[ ] Drowsy	[ ] Arousable	[ ] Asleep	[ ] Unresponsive    Side Effects:	[X] None	[ ] Nausea	[ ] Vomiting	[ ] Pruritus  		  [ ] Weakness		[ ] Numbness	[ ] Other:                              12.9   13.60 )-----------( 186      ( 07 Mar 2018 05:30 )             36.6       03-07    134<L>  |  92<L>  |  18  ----------------------------<  168<H>  3.7   |  27  |  0.93    Ca    9.1      07 Mar 2018 05:30        ASSESSMENT/ PLAN    Therapy to  be:	[] Continue   [x] Discontinued   [x] Change to prn Analgesics    Documentation and Verification of current medications:  [X] Done	[ ] Not done, not eligible  [ ] Not done, reason not given    [X]  [place]  Reviewed and Copied to Chart. Reference #: [id number]    Comments:  Patient requests oral analgesics.  Tylenol q6hrs x 2 days  Oxycodone IR PRN analgesia.  Valium PRN spasms

## 2018-03-07 NOTE — DISCHARGE NOTE ADULT - CARE PROVIDERS DIRECT ADDRESSES
[email or web address]

## 2018-03-07 NOTE — DISCHARGE NOTE ADULT - HOSPITAL COURSE
66 yo is s/p  above without any intraoperative complications.  Pt is weight bear as tolerated  doing well and stable for discharge home. call surgeon's office one week to make appt. D/C sutures/staples POD 14

## 2018-03-07 NOTE — DISCHARGE NOTE ADULT - CARE PROVIDER_API CALL
Gaston Gaxiola), Orthopaedic Surgery  [address] [place] [address]  Phone: [phone]  Fax: [phone]

## 2018-03-07 NOTE — PROGRESS NOTE ADULT - ASSESSMENT
A/P :   67M with C3-7 PSF POD #1            -  Stable           -  PT-WBAT             Pain control             DC Debra             Incentive spirometer             Follow up AM labs/ HV output        Bernardino Mcgowan  PGY 4

## 2018-03-07 NOTE — PHYSICAL THERAPY INITIAL EVALUATION ADULT - PATIENT PROFILE REVIEW, REHAB EVAL
ACTIVITY: OOB to Chair; spoke with STEPHY Wheat prior to PT evaluation--> Pt OK for PT consult/OOB activity/yes

## 2018-03-07 NOTE — PHYSICAL THERAPY INITIAL EVALUATION ADULT - PERTINENT HX OF CURRENT PROBLEM, REHAB EVAL
Pt is a 67 year old male with PMH A-fib, HTN, and c/s stnosis; pt s/p Pt. is a 66 yo male with cervical stenosis.

## 2018-03-08 DIAGNOSIS — D53.9 NUTRITIONAL ANEMIA, UNSPECIFIED: ICD-10-CM

## 2018-03-08 DIAGNOSIS — E78.5 HYPERLIPIDEMIA, UNSPECIFIED: ICD-10-CM

## 2018-03-08 DIAGNOSIS — I10 ESSENTIAL (PRIMARY) HYPERTENSION: ICD-10-CM

## 2018-03-08 DIAGNOSIS — D50.0 IRON DEFICIENCY ANEMIA SECONDARY TO BLOOD LOSS (CHRONIC): ICD-10-CM

## 2018-03-08 DIAGNOSIS — I48.2 CHRONIC ATRIAL FIBRILLATION: ICD-10-CM

## 2018-03-08 LAB
BASOPHILS # BLD AUTO: 0.01 K/UL — SIGNIFICANT CHANGE UP (ref 0–0.2)
BASOPHILS NFR BLD AUTO: 0.1 % — SIGNIFICANT CHANGE UP (ref 0–2)
BUN SERPL-MCNC: 16 MG/DL — SIGNIFICANT CHANGE UP (ref 7–23)
CALCIUM SERPL-MCNC: 9.3 MG/DL — SIGNIFICANT CHANGE UP (ref 8.4–10.5)
CHLORIDE SERPL-SCNC: 93 MMOL/L — LOW (ref 98–107)
CO2 SERPL-SCNC: 29 MMOL/L — SIGNIFICANT CHANGE UP (ref 22–31)
CREAT SERPL-MCNC: 0.85 MG/DL — SIGNIFICANT CHANGE UP (ref 0.5–1.3)
EOSINOPHIL # BLD AUTO: 0.01 K/UL — SIGNIFICANT CHANGE UP (ref 0–0.5)
EOSINOPHIL NFR BLD AUTO: 0.1 % — SIGNIFICANT CHANGE UP (ref 0–6)
GLUCOSE SERPL-MCNC: 98 MG/DL — SIGNIFICANT CHANGE UP (ref 70–99)
HCT VFR BLD CALC: 35.4 % — LOW (ref 39–50)
HGB BLD-MCNC: 12.7 G/DL — LOW (ref 13–17)
IMM GRANULOCYTES # BLD AUTO: 0.06 # — SIGNIFICANT CHANGE UP
IMM GRANULOCYTES NFR BLD AUTO: 0.6 % — SIGNIFICANT CHANGE UP (ref 0–1.5)
LYMPHOCYTES # BLD AUTO: 1.36 K/UL — SIGNIFICANT CHANGE UP (ref 1–3.3)
LYMPHOCYTES # BLD AUTO: 12.6 % — LOW (ref 13–44)
MCHC RBC-ENTMCNC: 35.9 % — SIGNIFICANT CHANGE UP (ref 32–36)
MCHC RBC-ENTMCNC: 36.4 PG — HIGH (ref 27–34)
MCV RBC AUTO: 101.4 FL — HIGH (ref 80–100)
MONOCYTES # BLD AUTO: 1.55 K/UL — HIGH (ref 0–0.9)
MONOCYTES NFR BLD AUTO: 14.4 % — HIGH (ref 2–14)
NEUTROPHILS # BLD AUTO: 7.78 K/UL — HIGH (ref 1.8–7.4)
NEUTROPHILS NFR BLD AUTO: 72.2 % — SIGNIFICANT CHANGE UP (ref 43–77)
NRBC # FLD: 0 — SIGNIFICANT CHANGE UP
PLATELET # BLD AUTO: 170 K/UL — SIGNIFICANT CHANGE UP (ref 150–400)
PMV BLD: 10.2 FL — SIGNIFICANT CHANGE UP (ref 7–13)
POTASSIUM SERPL-MCNC: 3.7 MMOL/L — SIGNIFICANT CHANGE UP (ref 3.5–5.3)
POTASSIUM SERPL-SCNC: 3.7 MMOL/L — SIGNIFICANT CHANGE UP (ref 3.5–5.3)
RBC # BLD: 3.49 M/UL — LOW (ref 4.2–5.8)
RBC # FLD: 12.7 % — SIGNIFICANT CHANGE UP (ref 10.3–14.5)
SODIUM SERPL-SCNC: 134 MMOL/L — LOW (ref 135–145)
WBC # BLD: 10.77 K/UL — HIGH (ref 3.8–10.5)
WBC # FLD AUTO: 10.77 K/UL — HIGH (ref 3.8–10.5)

## 2018-03-08 PROCEDURE — 22600 ARTHRD PST TQ 1NTRSPC CRV: CPT

## 2018-03-08 PROCEDURE — 63045 LAM FACETEC & FORAMOT CRV: CPT

## 2018-03-08 PROCEDURE — 99223 1ST HOSP IP/OBS HIGH 75: CPT

## 2018-03-08 PROCEDURE — 63045 LAM FACETEC & FORAMOT CRV: CPT | Mod: 82

## 2018-03-08 PROCEDURE — 22614 ARTHRD PST TQ 1NTRSPC EA ADD: CPT

## 2018-03-08 PROCEDURE — 22614 ARTHRD PST TQ 1NTRSPC EA ADD: CPT | Mod: 82

## 2018-03-08 PROCEDURE — 63048 LAM FACETEC &FORAMOT EA ADDL: CPT | Mod: 82

## 2018-03-08 PROCEDURE — 22600 ARTHRD PST TQ 1NTRSPC CRV: CPT | Mod: 82

## 2018-03-08 PROCEDURE — 22842 INSERT SPINE FIXATION DEVICE: CPT

## 2018-03-08 PROCEDURE — 63048 LAM FACETEC &FORAMOT EA ADDL: CPT

## 2018-03-08 PROCEDURE — 22842 INSERT SPINE FIXATION DEVICE: CPT | Mod: 82

## 2018-03-08 RX ORDER — SODIUM CHLORIDE 9 MG/ML
1000 INJECTION INTRAMUSCULAR; INTRAVENOUS; SUBCUTANEOUS
Qty: 0 | Refills: 0 | Status: DISCONTINUED | OUTPATIENT
Start: 2018-03-08 | End: 2018-03-09

## 2018-03-08 RX ADMIN — Medication 650 MILLIGRAM(S): at 17:52

## 2018-03-08 RX ADMIN — OXYCODONE HYDROCHLORIDE 10 MILLIGRAM(S): 5 TABLET ORAL at 08:59

## 2018-03-08 RX ADMIN — Medication 100 MILLIGRAM(S): at 21:28

## 2018-03-08 RX ADMIN — OXYCODONE HYDROCHLORIDE 10 MILLIGRAM(S): 5 TABLET ORAL at 05:55

## 2018-03-08 RX ADMIN — OXYCODONE HYDROCHLORIDE 10 MILLIGRAM(S): 5 TABLET ORAL at 03:16

## 2018-03-08 RX ADMIN — Medication 1 TABLET(S): at 14:11

## 2018-03-08 RX ADMIN — DRONEDARONE 400 MILLIGRAM(S): 400 TABLET, FILM COATED ORAL at 05:54

## 2018-03-08 RX ADMIN — OXYCODONE HYDROCHLORIDE 10 MILLIGRAM(S): 5 TABLET ORAL at 09:50

## 2018-03-08 RX ADMIN — OXYCODONE HYDROCHLORIDE 10 MILLIGRAM(S): 5 TABLET ORAL at 06:41

## 2018-03-08 RX ADMIN — Medication 100 MILLIGRAM(S): at 14:11

## 2018-03-08 RX ADMIN — AMLODIPINE BESYLATE 10 MILLIGRAM(S): 2.5 TABLET ORAL at 05:55

## 2018-03-08 RX ADMIN — Medication 100 MILLIGRAM(S): at 05:55

## 2018-03-08 RX ADMIN — Medication 25 MILLIGRAM(S): at 07:05

## 2018-03-08 RX ADMIN — Medication 50 MILLIGRAM(S): at 17:52

## 2018-03-08 RX ADMIN — Medication 1 TABLET(S): at 12:02

## 2018-03-08 RX ADMIN — SODIUM CHLORIDE 85 MILLILITER(S): 9 INJECTION INTRAMUSCULAR; INTRAVENOUS; SUBCUTANEOUS at 11:34

## 2018-03-08 RX ADMIN — SENNA PLUS 2 TABLET(S): 8.6 TABLET ORAL at 21:27

## 2018-03-08 RX ADMIN — OXYCODONE HYDROCHLORIDE 10 MILLIGRAM(S): 5 TABLET ORAL at 02:20

## 2018-03-08 RX ADMIN — Medication 650 MILLIGRAM(S): at 00:08

## 2018-03-08 RX ADMIN — Medication 650 MILLIGRAM(S): at 12:02

## 2018-03-08 RX ADMIN — Medication 1 TABLET(S): at 05:55

## 2018-03-08 RX ADMIN — OXYCODONE HYDROCHLORIDE 10 MILLIGRAM(S): 5 TABLET ORAL at 22:20

## 2018-03-08 RX ADMIN — Medication 1 MILLIGRAM(S): at 12:02

## 2018-03-08 RX ADMIN — Medication 650 MILLIGRAM(S): at 05:55

## 2018-03-08 RX ADMIN — LISINOPRIL 40 MILLIGRAM(S): 2.5 TABLET ORAL at 05:55

## 2018-03-08 RX ADMIN — DRONEDARONE 400 MILLIGRAM(S): 400 TABLET, FILM COATED ORAL at 17:52

## 2018-03-08 RX ADMIN — Medication 1 TABLET(S): at 21:28

## 2018-03-08 RX ADMIN — Medication 50 MILLIGRAM(S): at 05:55

## 2018-03-08 RX ADMIN — OXYCODONE HYDROCHLORIDE 10 MILLIGRAM(S): 5 TABLET ORAL at 21:28

## 2018-03-08 NOTE — OCCUPATIONAL THERAPY INITIAL EVALUATION ADULT - PERTINENT HX OF CURRENT PROBLEM, REHAB EVAL
Pt. is a 67 year old male with cervical stenosis. Pt is now s/p C3-6 posterior spinal fusion on 3/6/18.

## 2018-03-08 NOTE — OCCUPATIONAL THERAPY INITIAL EVALUATION ADULT - NS ASR DRESSING EQUIP NEEDS
Pt. to be educated on benefits and how to privately purchase during upcoming ADL session./dressing stick/long handled shoe horn/sock aide/reacher

## 2018-03-08 NOTE — OCCUPATIONAL THERAPY INITIAL EVALUATION ADULT - NS ASR BATHING EQUIP NEEDS
shower chair/Pt. to be educated on benefits and how to privately purchase during upcoming ADL session./grab bar

## 2018-03-08 NOTE — OCCUPATIONAL THERAPY INITIAL EVALUATION ADULT - ANTICIPATED DISCHARGE DISPOSITION, OT EVAL
Pt. may benefit from Home w/ OT. The patient should be evaluated upon discharge for the need for home OT services. Pt. reports his wife is available at home to assist with ADL's as needed.

## 2018-03-08 NOTE — OCCUPATIONAL THERAPY INITIAL EVALUATION ADULT - LIVES WITH, PROFILE
Pt. reports he lives with his wife in a house with steps to enter. Once inside, pt. reports he has steps to negotiate. Per pt., he has a shower stall in his bathroom.

## 2018-03-08 NOTE — OCCUPATIONAL THERAPY INITIAL EVALUATION ADULT - LEVEL OF INDEPENDENCE: SIT/SUPINE, REHAB EVAL
Not assessed. Pt left sitting in chair near bed. No acute distress. Call bell in reach. All lines intact and precautions maintained. RN, made aware.

## 2018-03-08 NOTE — OCCUPATIONAL THERAPY INITIAL EVALUATION ADULT - RANGE OF MOTION EXAMINATION, UPPER EXTREMITY
bilateral UE Active ROM was WFL  (within functional limits)/Shoulder Flexion AROM 0-90 degrees (only assessed to 90 degrees secondary to spinal precautions)

## 2018-03-08 NOTE — CONSULT NOTE ADULT - ASSESSMENT
66 yo man PMH atrial fibrillation s/p cardioversion in 2016, cervical stenosis of spine, hyperlipidemia, hypertension presented for worsening cervical stenosis now with C3-7 PSF POD #2

## 2018-03-08 NOTE — CONSULT NOTE ADULT - SUBJECTIVE AND OBJECTIVE BOX
CHIEF COMPLAINT: Patient is a 67y old  Male who presents with a chief complaint of C3-6 PSF 3/6/18 (07 Mar 2018 11:05)      HPI: Pt. is a 66 yo male with cervical stenosis PMH atrial fibrillation s/p cardioversion in 2016, cervical stenosis of spine, hyperlipidemia, hypertension presented for cervical laminectomy now post-op day 1. Pt reports pain in his neck but improved when sitting up in his chair. Pain in his neck was about 2/10 at time of my exam as he was sitting in chair with his Aspen collar watching TV. He had an episode of nausea in PACU now resolved. Eating and tolerating his meal. Richardson removed today but he had not yet voided at time of exam. He denied any chest pain, palpitations, dyspnea.       Allergies    No Known Allergies    Intolerances        HOME MEDICATIONS: [x] Reviewed w/ patient and in [place]    MEDICATIONS  (STANDING):  acetaminophen   Tablet. 650 milliGRAM(s) Oral every 6 hours  amLODIPine   Tablet 10 milliGRAM(s) Oral daily  calcium carbonate 1250 mG + Vitamin D (OsCal 500 + D) 1 Tablet(s) Oral three times a day  chlorthalidone 25 milliGRAM(s) Oral daily  docusate sodium 100 milliGRAM(s) Oral three times a day  dronedarone 400 milliGRAM(s) Oral two times a day  folic acid 1 milliGRAM(s) Oral daily  lisinopril 40 milliGRAM(s) Oral daily  metoprolol     tartrate 50 milliGRAM(s) Oral two times a day  multivitamin 1 Tablet(s) Oral daily  senna 2 Tablet(s) Oral at bedtime  sodium chloride 0.9%. 1000 milliLiter(s) (85 mL/Hr) IV Continuous <Continuous>    MEDICATIONS  (PRN):  acetaminophen   Tablet 650 milliGRAM(s) Oral every 6 hours PRN For Temp greater than 38.5 C (101.3 F)  aluminum hydroxide/magnesium hydroxide/simethicone Suspension 30 milliLiter(s) Oral every 12 hours PRN Indigestion  diazepam    Tablet 5 milliGRAM(s) Oral every 6 hours PRN spasms  famotidine    Tablet 20 milliGRAM(s) Oral every 24 hours PRN Dyspepsia  magnesium hydroxide Suspension 30 milliLiter(s) Oral every 12 hours PRN Constipation  naloxone Injectable 0.1 milliGRAM(s) IV Push every 3 minutes PRN For ANY of the following changes in patient status:  A. RR LESS THAN 10 breaths per minute, B. Oxygen saturation LESS THAN 90%, C. Sedation score of 6  ondansetron Injectable 4 milliGRAM(s) IV Push every 6 hours PRN Nausea  ondansetron Injectable 4 milliGRAM(s) IV Push every 6 hours PRN Nausea  oxyCODONE    IR 5 milliGRAM(s) Oral every 3 hours PRN Moderate Pain (4 - 6)  oxyCODONE    IR 10 milliGRAM(s) Oral every 3 hours PRN Severe Pain (7 - 10)      PAST MEDICAL & SURGICAL HISTORY: Reviewed  Cervical stenosis of spine  Atrial fibrillation  Hyperlipidemia  Hypertension  S/P Mohs surgery for basal cell carcinoma: nose-2001  History of Mohs surgery for squamous cell carcinoma in situ of skin: scalp x3-2012, 2015  History of cardioversion: 2016      SOCIAL HISTORY: Denies toxic habits      FAMILY HISTORY:  No pertinent family history in first degree relatives  [x] No pertinent family history in first degree relatives     REVIEW OF SYSTEMS:  [x] All other ROS negative  [  ] Unable to obtain due to poor mental status    Vital Signs Last 24 Hrs  T(C): 36.9 (08 Mar 2018 09:11), Max: 37.1 (08 Mar 2018 03:08)  T(F): 98.5 (08 Mar 2018 09:11), Max: 98.8 (08 Mar 2018 03:08)  HR: 77 (08 Mar 2018 09:11) (70 - 77)  BP: 118/66 (08 Mar 2018 09:11) (105/57 - 123/63)  BP(mean): --  RR: 17 (08 Mar 2018 09:11) (16 - 17)  SpO2: 97% (08 Mar 2018 09:11) (97% - 98%)    PHYSICAL EXAM:  GENERAL: NAD, well-groomed, well-developed  HEENT: EOMI, PERRLA, conjunctiva and sclera clear  ENMT: Moist mucous membranes  NECK: Aspen collar in place  RESPIRATORY: Clear to auscultation bilaterally; No rales, rhonchi, wheezing  CARDIOVASCULAR: S1/S2, Regular rate and rhythm; soft MALCOM  GASTROINTESTINAL: Soft, Nontender, Nondistended; Bowel sounds present  EXTREMITIES:  2+ Peripheral Pulses, No clubbing, cyanosis, or edema  NERVOUS SYSTEM:  Alert & Oriented X3; Moving all 4 extremities; No gross sensory deficits    LABS: reviewed                        12.7   10.77 )-----------( 170      ( 08 Mar 2018 07:20 )             35.4     Hemoglobin: 12.7 g/dL (03-08 @ 07:20)  Hemoglobin: 12.9 g/dL (03-07 @ 05:30)  Hemoglobin: 13.1 g/dL (03-06 @ 16:10)    03-08    134<L>  |  93<L>  |  16  ----------------------------<  98  3.7   |  29  |  0.85    Ca    9.3      08 Mar 2018 07:20          Microbiology     RADIOLOGY & ADDITIONAL STUDIES:    EKG:   Personally Reviewed:  [x] YES     Imaging:   Personally Reviewed:  [x] YES               [ ] Consultant(s) Notes Reviewed  [x] Care Discussed with Consultants/Other Providers: Ortho PA - discussed pt's PT CHIEF COMPLAINT: Patient is a 67y old  Male who presents with a chief complaint of C3-6 PSF 3/6/18 (07 Mar 2018 11:05)      HPI: Pt. is a 66 yo male with cervical stenosis PMH atrial fibrillation s/p cardioversion in 2016, cervical stenosis of spine, hyperlipidemia, hypertension presented for cervical laminectomy now post-op day 1. Pt reports pain in his neck but improved when sitting up in his chair. Pain in his neck was about 2/10 at time of my exam as he was sitting in chair with his Aspen collar watching TV. He had an episode of nausea in PACU now resolved. Eating and tolerating his meal. Richardson removed today but he had not yet voided at time of exam. He denied any chest pain, palpitations, dyspnea.       Allergies    No Known Allergies    Intolerances        HOME MEDICATIONS: [x] Reviewed w/ patient and in [place]    MEDICATIONS  (STANDING):  acetaminophen   Tablet. 650 milliGRAM(s) Oral every 6 hours  amLODIPine   Tablet 10 milliGRAM(s) Oral daily  calcium carbonate 1250 mG + Vitamin D (OsCal 500 + D) 1 Tablet(s) Oral three times a day  chlorthalidone 25 milliGRAM(s) Oral daily  docusate sodium 100 milliGRAM(s) Oral three times a day  dronedarone 400 milliGRAM(s) Oral two times a day  folic acid 1 milliGRAM(s) Oral daily  lisinopril 40 milliGRAM(s) Oral daily  metoprolol     tartrate 50 milliGRAM(s) Oral two times a day  multivitamin 1 Tablet(s) Oral daily  senna 2 Tablet(s) Oral at bedtime  sodium chloride 0.9%. 1000 milliLiter(s) (85 mL/Hr) IV Continuous <Continuous>    MEDICATIONS  (PRN):  acetaminophen   Tablet 650 milliGRAM(s) Oral every 6 hours PRN For Temp greater than 38.5 C (101.3 F)  aluminum hydroxide/magnesium hydroxide/simethicone Suspension 30 milliLiter(s) Oral every 12 hours PRN Indigestion  diazepam    Tablet 5 milliGRAM(s) Oral every 6 hours PRN spasms  famotidine    Tablet 20 milliGRAM(s) Oral every 24 hours PRN Dyspepsia  magnesium hydroxide Suspension 30 milliLiter(s) Oral every 12 hours PRN Constipation  naloxone Injectable 0.1 milliGRAM(s) IV Push every 3 minutes PRN For ANY of the following changes in patient status:  A. RR LESS THAN 10 breaths per minute, B. Oxygen saturation LESS THAN 90%, C. Sedation score of 6  ondansetron Injectable 4 milliGRAM(s) IV Push every 6 hours PRN Nausea  ondansetron Injectable 4 milliGRAM(s) IV Push every 6 hours PRN Nausea  oxyCODONE    IR 5 milliGRAM(s) Oral every 3 hours PRN Moderate Pain (4 - 6)  oxyCODONE    IR 10 milliGRAM(s) Oral every 3 hours PRN Severe Pain (7 - 10)      PAST MEDICAL & SURGICAL HISTORY: Reviewed  Cervical stenosis of spine  Atrial fibrillation  Hyperlipidemia  Hypertension  S/P Mohs surgery for basal cell carcinoma: nose-2001  History of Mohs surgery for squamous cell carcinoma in situ of skin: scalp x3-2012, 2015  History of cardioversion: 2016      SOCIAL HISTORY: Denies toxic habits      FAMILY HISTORY:  No pertinent family history in first degree relatives  [x] No pertinent family history in first degree relatives     REVIEW OF SYSTEMS:  [x] All other ROS negative  [  ] Unable to obtain due to poor mental status    Vital Signs Last 24 Hrs  T(C): 36.9 (08 Mar 2018 09:11), Max: 37.1 (08 Mar 2018 03:08)  T(F): 98.5 (08 Mar 2018 09:11), Max: 98.8 (08 Mar 2018 03:08)  HR: 77 (08 Mar 2018 09:11) (70 - 77)  BP: 118/66 (08 Mar 2018 09:11) (105/57 - 123/63)  BP(mean): --  RR: 17 (08 Mar 2018 09:11) (16 - 17)  SpO2: 97% (08 Mar 2018 09:11) (97% - 98%)    PHYSICAL EXAM:  GENERAL: NAD, well-groomed, well-developed  HEENT: EOMI, PERRLA, conjunctiva and sclera clear  ENMT: Moist mucous membranes  NECK: Aspen collar in place  RESPIRATORY: Clear to auscultation bilaterally; No rales, rhonchi, wheezing  CARDIOVASCULAR: S1/S2, Regular rate and rhythm; soft MALCOM  GASTROINTESTINAL:  Nondistended; Bowel sounds present, Soft, Nontender,  EXTREMITIES:  2+ Peripheral Pulses, No clubbing, cyanosis, or edema  NERVOUS SYSTEM:  Alert & Oriented X3; Moving all 4 extremities; No gross sensory deficits    LABS: reviewed                        12.7   10.77 )-----------( 170      ( 08 Mar 2018 07:20 )             35.4     Hemoglobin: 12.7 g/dL (03-08 @ 07:20)  Hemoglobin: 12.9 g/dL (03-07 @ 05:30)  Hemoglobin: 13.1 g/dL (03-06 @ 16:10)    03-08    134<L>  |  93<L>  |  16  ----------------------------<  98  3.7   |  29  |  0.85    Ca    9.3      08 Mar 2018 07:20            RADIOLOGY & ADDITIONAL STUDIES:    EKG:   Personally Reviewed:  [x] YES     Imaging:   Personally Reviewed:  [x] YES               [ ] Consultant(s) Notes Reviewed  [x] Care Discussed with Consultants/Other Providers: Ortho PA - discussed pt's PT and overall plan

## 2018-03-08 NOTE — OCCUPATIONAL THERAPY INITIAL EVALUATION ADULT - GENERAL OBSERVATIONS, REHAB EVAL
Pt. received sitting at edge of bed with PTA present. No acute distress. Patient agreed to evaluation from Occupational Therapist. +Clean dry intact dressing to Cervical neck region, +Heplock, +Accordion Drain.

## 2018-03-08 NOTE — PROGRESS NOTE ADULT - ASSESSMENT
A/P : 67M with C3-7 PSF POD #2            -  Stable/Monitor HV           -  PT-WBAT           - Pain control             -DC Richardson             Incentive spirometer  	  	        Bernardino Mcgowan  PGY 4

## 2018-03-08 NOTE — PROGRESS NOTE ADULT - SUBJECTIVE AND OBJECTIVE BOX
ORTHO PROGRESS NOTE     Patient resting comfortably however notes posterior incisional pain  Tolerating diet, had one episode of emesis in PACU secondary due to PCA  Able to work with physical therapy and get OOB      Vital Signs Last 24 Hrs  T(C): 37.1 (08 Mar 2018 05:53), Max: 37.1 (08 Mar 2018 03:08)  T(F): 98.7 (08 Mar 2018 05:53), Max: 98.8 (08 Mar 2018 03:08)  HR: 70 (08 Mar 2018 05:53) (67 - 82)  BP: 121/64 (08 Mar 2018 05:53) (104/66 - 155/91)  BP(mean): --  RR: 17 (08 Mar 2018 05:53) (12 - 17)  SpO2: 98% (08 Mar 2018 05:53) (97% - 100%)      Neck: Dressing clean/ dry/intact              HV drain: 2.5/87.5            Aspen collar in place  	    UE: Deltoids: 5/5         Biceps: 5/5         Triceps: 5/5          Wrist ext: 5/5         : 5/5                          12.9   13.60 )-----------( 186      ( 07 Mar 2018 05:30 )             36.6

## 2018-03-09 VITALS
RESPIRATION RATE: 17 BRPM | OXYGEN SATURATION: 97 % | DIASTOLIC BLOOD PRESSURE: 58 MMHG | TEMPERATURE: 98 F | SYSTOLIC BLOOD PRESSURE: 114 MMHG | HEART RATE: 66 BPM

## 2018-03-09 LAB
BASOPHILS # BLD AUTO: 0.02 K/UL — SIGNIFICANT CHANGE UP (ref 0–0.2)
BASOPHILS NFR BLD AUTO: 0.2 % — SIGNIFICANT CHANGE UP (ref 0–2)
BUN SERPL-MCNC: 19 MG/DL — SIGNIFICANT CHANGE UP (ref 7–23)
CALCIUM SERPL-MCNC: 9.7 MG/DL — SIGNIFICANT CHANGE UP (ref 8.4–10.5)
CHLORIDE SERPL-SCNC: 92 MMOL/L — LOW (ref 98–107)
CO2 SERPL-SCNC: 28 MMOL/L — SIGNIFICANT CHANGE UP (ref 22–31)
CREAT SERPL-MCNC: 0.91 MG/DL — SIGNIFICANT CHANGE UP (ref 0.5–1.3)
EOSINOPHIL # BLD AUTO: 0.11 K/UL — SIGNIFICANT CHANGE UP (ref 0–0.5)
EOSINOPHIL NFR BLD AUTO: 1 % — SIGNIFICANT CHANGE UP (ref 0–6)
FOLATE SERPL-MCNC: 6.6 NG/ML — SIGNIFICANT CHANGE UP (ref 4.7–20)
GLUCOSE SERPL-MCNC: 91 MG/DL — SIGNIFICANT CHANGE UP (ref 70–99)
HCT VFR BLD CALC: 37.8 % — LOW (ref 39–50)
HGB BLD-MCNC: 13.2 G/DL — SIGNIFICANT CHANGE UP (ref 13–17)
IMM GRANULOCYTES # BLD AUTO: 0.08 # — SIGNIFICANT CHANGE UP
IMM GRANULOCYTES NFR BLD AUTO: 0.7 % — SIGNIFICANT CHANGE UP (ref 0–1.5)
LYMPHOCYTES # BLD AUTO: 1.74 K/UL — SIGNIFICANT CHANGE UP (ref 1–3.3)
LYMPHOCYTES # BLD AUTO: 15.5 % — SIGNIFICANT CHANGE UP (ref 13–44)
MCHC RBC-ENTMCNC: 34.9 % — SIGNIFICANT CHANGE UP (ref 32–36)
MCHC RBC-ENTMCNC: 35.1 PG — HIGH (ref 27–34)
MCV RBC AUTO: 100.5 FL — HIGH (ref 80–100)
MONOCYTES # BLD AUTO: 1.79 K/UL — HIGH (ref 0–0.9)
MONOCYTES NFR BLD AUTO: 15.9 % — HIGH (ref 2–14)
NEUTROPHILS # BLD AUTO: 7.5 K/UL — HIGH (ref 1.8–7.4)
NEUTROPHILS NFR BLD AUTO: 66.7 % — SIGNIFICANT CHANGE UP (ref 43–77)
NRBC # FLD: 0 — SIGNIFICANT CHANGE UP
PLATELET # BLD AUTO: 181 K/UL — SIGNIFICANT CHANGE UP (ref 150–400)
PMV BLD: 10.7 FL — SIGNIFICANT CHANGE UP (ref 7–13)
POTASSIUM SERPL-MCNC: 3.8 MMOL/L — SIGNIFICANT CHANGE UP (ref 3.5–5.3)
POTASSIUM SERPL-SCNC: 3.8 MMOL/L — SIGNIFICANT CHANGE UP (ref 3.5–5.3)
RBC # BLD: 3.76 M/UL — LOW (ref 4.2–5.8)
RBC # FLD: 12.3 % — SIGNIFICANT CHANGE UP (ref 10.3–14.5)
SODIUM SERPL-SCNC: 132 MMOL/L — LOW (ref 135–145)
TSH SERPL-MCNC: 1.7 UIU/ML — SIGNIFICANT CHANGE UP (ref 0.27–4.2)
VIT B12 SERPL-MCNC: 322 PG/ML — SIGNIFICANT CHANGE UP (ref 200–900)
WBC # BLD: 11.24 K/UL — HIGH (ref 3.8–10.5)
WBC # FLD AUTO: 11.24 K/UL — HIGH (ref 3.8–10.5)

## 2018-03-09 PROCEDURE — 99232 SBSQ HOSP IP/OBS MODERATE 35: CPT

## 2018-03-09 RX ORDER — AMLODIPINE BESYLATE 2.5 MG/1
1 TABLET ORAL
Qty: 0 | Refills: 0 | COMMUNITY

## 2018-03-09 RX ORDER — CHLORTHALIDONE 50 MG
1 TABLET ORAL
Qty: 0 | Refills: 0 | COMMUNITY
Start: 2018-03-09

## 2018-03-09 RX ORDER — DRONEDARONE 400 MG/1
1 TABLET, FILM COATED ORAL
Qty: 0 | Refills: 0 | COMMUNITY

## 2018-03-09 RX ORDER — CHLORTHALIDONE 50 MG
1 TABLET ORAL
Qty: 0 | Refills: 0 | COMMUNITY

## 2018-03-09 RX ORDER — ASPIRIN/CALCIUM CARB/MAGNESIUM 324 MG
1 TABLET ORAL
Qty: 0 | Refills: 0 | COMMUNITY

## 2018-03-09 RX ORDER — METOPROLOL TARTRATE 50 MG
1 TABLET ORAL
Qty: 0 | Refills: 0 | COMMUNITY

## 2018-03-09 RX ORDER — AMLODIPINE BESYLATE 2.5 MG/1
1 TABLET ORAL
Qty: 0 | Refills: 0 | COMMUNITY
Start: 2018-03-09

## 2018-03-09 RX ORDER — OXYCODONE HYDROCHLORIDE 5 MG/1
1 TABLET ORAL
Qty: 56 | Refills: 0 | OUTPATIENT
Start: 2018-03-09 | End: 2018-03-15

## 2018-03-09 RX ADMIN — OXYCODONE HYDROCHLORIDE 10 MILLIGRAM(S): 5 TABLET ORAL at 04:10

## 2018-03-09 RX ADMIN — LISINOPRIL 40 MILLIGRAM(S): 2.5 TABLET ORAL at 05:38

## 2018-03-09 RX ADMIN — Medication 1 TABLET(S): at 05:38

## 2018-03-09 RX ADMIN — OXYCODONE HYDROCHLORIDE 10 MILLIGRAM(S): 5 TABLET ORAL at 03:12

## 2018-03-09 RX ADMIN — Medication 100 MILLIGRAM(S): at 05:38

## 2018-03-09 RX ADMIN — Medication 50 MILLIGRAM(S): at 05:38

## 2018-03-09 RX ADMIN — Medication 650 MILLIGRAM(S): at 05:38

## 2018-03-09 RX ADMIN — AMLODIPINE BESYLATE 10 MILLIGRAM(S): 2.5 TABLET ORAL at 05:38

## 2018-03-09 RX ADMIN — Medication 25 MILLIGRAM(S): at 05:38

## 2018-03-09 RX ADMIN — DRONEDARONE 400 MILLIGRAM(S): 400 TABLET, FILM COATED ORAL at 05:38

## 2018-03-09 RX ADMIN — Medication 650 MILLIGRAM(S): at 00:22

## 2018-03-09 NOTE — PROGRESS NOTE ADULT - SUBJECTIVE AND OBJECTIVE BOX
ORTHO PROGRESS NOTE     Patient resting comfortably.  HV had 5/90 with minimal output. Was discontinued on rounds  Patient notes improvement in lower extremity pain.  Has been ambulating with assistance out of bed.      Vital Signs Last 24 Hrs  T(C): 36.8 (09 Mar 2018 05:34), Max: 37.2 (08 Mar 2018 14:34)  T(F): 98.3 (09 Mar 2018 05:34), Max: 99 (08 Mar 2018 14:34)  HR: 67 (09 Mar 2018 05:34) (58 - 77)  BP: 112/61 (09 Mar 2018 05:34) (100/62 - 127/71)  BP(mean): --  RR: 16 (09 Mar 2018 05:34) (15 - 17)  SpO2: 97% (09 Mar 2018 05:34) (97% - 100%)      Neck: Dressing clean/ dry/intact             HV D/C'd            Aspen collar in place  	    UE: Deltoids: 5/5         Biceps: 5/5         Triceps: 5/5          Wrist ext: 5/5         : 5/5                          12.9   13.60 )-----------( 186      ( 07 Mar 2018 05:30 )             36.6

## 2018-03-09 NOTE — PROGRESS NOTE ADULT - ASSESSMENT
66 yo man PMH atrial fibrillation s/p cardioversion in 2016, cervical stenosis of spine, hyperlipidemia, hypertension presented for worsening cervical stenosis now with C3-7 PSF POD #3

## 2018-03-09 NOTE — PROGRESS NOTE ADULT - SUBJECTIVE AND OBJECTIVE BOX
CHIEF COMPLAINT: f/u orthopedic co-management    SUBJECTIVE / OVERNIGHT EVENTS: Patient seen and examined. No acute events overnight. Pain well controlled and patient without any complaints. Sitting up in chair in [place] collar. Denied any numbness, tingling in his hands. Denied any weakness. Reports he walked w/ PT this am. Eating well. Passing gas but no BM. Denied chest pain, palpitations, shortness of breath    MEDICATIONS  (STANDING):  amLODIPine   Tablet 10 milliGRAM(s) Oral daily  calcium carbonate 1250 mG + Vitamin D (OsCal 500 + D) 1 Tablet(s) Oral three times a day  chlorthalidone 25 milliGRAM(s) Oral daily  docusate sodium 100 milliGRAM(s) Oral three times a day  dronedarone 400 milliGRAM(s) Oral two times a day  folic acid 1 milliGRAM(s) Oral daily  lisinopril 40 milliGRAM(s) Oral daily  metoprolol     tartrate 50 milliGRAM(s) Oral two times a day  multivitamin 1 Tablet(s) Oral daily  senna 2 Tablet(s) Oral at bedtime  sodium chloride 0.9%. 1000 milliLiter(s) (85 mL/Hr) IV Continuous <Continuous>    MEDICATIONS  (PRN):  acetaminophen   Tablet 650 milliGRAM(s) Oral every 6 hours PRN For Temp greater than 38.5 C (101.3 F)  aluminum hydroxide/magnesium hydroxide/simethicone Suspension 30 milliLiter(s) Oral every 12 hours PRN Indigestion  diazepam    Tablet 5 milliGRAM(s) Oral every 6 hours PRN spasms  famotidine    Tablet 20 milliGRAM(s) Oral every 24 hours PRN Dyspepsia  magnesium hydroxide Suspension 30 milliLiter(s) Oral every 12 hours PRN Constipation  naloxone Injectable 0.1 milliGRAM(s) IV Push every 3 minutes PRN For ANY of the following changes in patient status:  A. RR LESS THAN 10 breaths per minute, B. Oxygen saturation LESS THAN 90%, C. Sedation score of 6  ondansetron Injectable 4 milliGRAM(s) IV Push every 6 hours PRN Nausea  ondansetron Injectable 4 milliGRAM(s) IV Push every 6 hours PRN Nausea  oxyCODONE    IR 5 milliGRAM(s) Oral every 3 hours PRN Moderate Pain (4 - 6)  oxyCODONE    IR 10 milliGRAM(s) Oral every 3 hours PRN Severe Pain (7 - 10)      VITALS:  T(F): 98.2 (03-09-18 @ 09:11), Max: 99 (03-08-18 @ 16:47)  HR: 66 (03-09-18 @ 09:11) (58 - 75)  BP: 114/58 (03-09-18 @ 09:11) (100/62 - 127/71)  RR: 17 (03-09-18 @ 09:11) (15 - 17)  SpO2: 97% (03-09-18 @ 09:11)  Wt(kg): --      CAPILLARY BLOOD GLUCOSE    PHYSICAL EXAM:  GENERAL: NAD, well-developed  HEENT: EOMI, PERRLA, conjunctiva and sclera clear  NECK: wearing Aspen collar  CHEST/LUNG: Clear to auscultation bilaterally; No wheezing/ rales  HEART: S1/S2, Regular rate and rhythm;  ABDOMEN: Soft, Nontender, Nondistended; Bowel sounds present  EXTREMITIES:  2+ Peripheral Pulses, No clubbing, cyanosis, or edema  PSYCH: AAOx3  NEUROLOGY: non-focal  SKIN: No rashes or lesions    LABS: reviewed              13.2                 132  | 28   | 19           11.24 >-----------< 181     ------------------------< 91                    37.8                 3.8  | 92   | 0.91                                         Ca 9.7   Mg x     Ph x                RADIOLOGY & ADDITIONAL TESTS:  Imaging Personally Reviewed: [x] Yes    [ ] Consultant(s) Notes Reviewed:  [x] Care Discussed with Consultants/Other Providers: Orthopedic PA - discussed re: likely d/c home

## 2018-03-21 ENCOUNTER — CHART COPY (OUTPATIENT)
Age: 68
End: 2018-03-21

## 2018-03-21 ENCOUNTER — APPOINTMENT (OUTPATIENT)
Dept: ORTHOPEDIC SURGERY | Facility: CLINIC | Age: 68
End: 2018-03-21

## 2018-03-26 ENCOUNTER — APPOINTMENT (OUTPATIENT)
Dept: ORTHOPEDIC SURGERY | Facility: CLINIC | Age: 68
End: 2018-03-26
Payer: MEDICARE

## 2018-03-26 PROCEDURE — 99024 POSTOP FOLLOW-UP VISIT: CPT

## 2018-03-26 PROCEDURE — 72040 X-RAY EXAM NECK SPINE 2-3 VW: CPT

## 2018-03-29 ENCOUNTER — INPATIENT (INPATIENT)
Facility: HOSPITAL | Age: 68
LOS: 1 days | Discharge: HOME | End: 2018-03-31
Attending: HOSPITALIST | Admitting: HOSPITALIST
Payer: MEDICARE

## 2018-03-29 VITALS
HEART RATE: 84 BPM | TEMPERATURE: 98 F | OXYGEN SATURATION: 99 % | DIASTOLIC BLOOD PRESSURE: 75 MMHG | SYSTOLIC BLOOD PRESSURE: 118 MMHG | RESPIRATION RATE: 18 BRPM

## 2018-03-29 DIAGNOSIS — Z98.890 OTHER SPECIFIED POSTPROCEDURAL STATES: Chronic | ICD-10-CM

## 2018-03-29 LAB
ALBUMIN SERPL ELPH-MCNC: 3.6 G/DL — SIGNIFICANT CHANGE UP (ref 3.3–5)
ALP SERPL-CCNC: 92 U/L — SIGNIFICANT CHANGE UP (ref 40–120)
ALT FLD-CCNC: 13 U/L — SIGNIFICANT CHANGE UP (ref 4–41)
APPEARANCE UR: CLEAR — SIGNIFICANT CHANGE UP
AST SERPL-CCNC: 18 U/L — SIGNIFICANT CHANGE UP (ref 4–40)
BASOPHILS # BLD AUTO: 0.02 K/UL — SIGNIFICANT CHANGE UP (ref 0–0.2)
BASOPHILS NFR BLD AUTO: 0.2 % — SIGNIFICANT CHANGE UP (ref 0–2)
BILIRUB SERPL-MCNC: 0.5 MG/DL — SIGNIFICANT CHANGE UP (ref 0.2–1.2)
BILIRUB UR-MCNC: NEGATIVE — SIGNIFICANT CHANGE UP
BLOOD UR QL VISUAL: NEGATIVE — SIGNIFICANT CHANGE UP
BUN SERPL-MCNC: 20 MG/DL — SIGNIFICANT CHANGE UP (ref 7–23)
CALCIUM SERPL-MCNC: 9.5 MG/DL — SIGNIFICANT CHANGE UP (ref 8.4–10.5)
CHLORIDE SERPL-SCNC: 87 MMOL/L — LOW (ref 98–107)
CO2 SERPL-SCNC: 25 MMOL/L — SIGNIFICANT CHANGE UP (ref 22–31)
COLOR SPEC: SIGNIFICANT CHANGE UP
CREAT SERPL-MCNC: 1.5 MG/DL — HIGH (ref 0.5–1.3)
EOSINOPHIL # BLD AUTO: 0.14 K/UL — SIGNIFICANT CHANGE UP (ref 0–0.5)
EOSINOPHIL NFR BLD AUTO: 1.4 % — SIGNIFICANT CHANGE UP (ref 0–6)
GLUCOSE SERPL-MCNC: 92 MG/DL — SIGNIFICANT CHANGE UP (ref 70–99)
GLUCOSE UR-MCNC: NEGATIVE — SIGNIFICANT CHANGE UP
HCT VFR BLD CALC: 33.7 % — LOW (ref 39–50)
HGB BLD-MCNC: 11.9 G/DL — LOW (ref 13–17)
IMM GRANULOCYTES # BLD AUTO: 0.07 # — SIGNIFICANT CHANGE UP
IMM GRANULOCYTES NFR BLD AUTO: 0.7 % — SIGNIFICANT CHANGE UP (ref 0–1.5)
KETONES UR-MCNC: NEGATIVE — SIGNIFICANT CHANGE UP
LEUKOCYTE ESTERASE UR-ACNC: SIGNIFICANT CHANGE UP
LYMPHOCYTES # BLD AUTO: 1.13 K/UL — SIGNIFICANT CHANGE UP (ref 1–3.3)
LYMPHOCYTES # BLD AUTO: 11.5 % — LOW (ref 13–44)
MCHC RBC-ENTMCNC: 33.3 PG — SIGNIFICANT CHANGE UP (ref 27–34)
MCHC RBC-ENTMCNC: 35.3 % — SIGNIFICANT CHANGE UP (ref 32–36)
MCV RBC AUTO: 94.4 FL — SIGNIFICANT CHANGE UP (ref 80–100)
MONOCYTES # BLD AUTO: 1.44 K/UL — HIGH (ref 0–0.9)
MONOCYTES NFR BLD AUTO: 14.7 % — HIGH (ref 2–14)
NEUTROPHILS # BLD AUTO: 7.02 K/UL — SIGNIFICANT CHANGE UP (ref 1.8–7.4)
NEUTROPHILS NFR BLD AUTO: 71.5 % — SIGNIFICANT CHANGE UP (ref 43–77)
NITRITE UR-MCNC: NEGATIVE — SIGNIFICANT CHANGE UP
NON-SQ EPI CELLS # UR AUTO: <1 — SIGNIFICANT CHANGE UP
NRBC # FLD: 0 — SIGNIFICANT CHANGE UP
PH UR: 6.5 — SIGNIFICANT CHANGE UP (ref 4.6–8)
PLATELET # BLD AUTO: 319 K/UL — SIGNIFICANT CHANGE UP (ref 150–400)
PMV BLD: 9.2 FL — SIGNIFICANT CHANGE UP (ref 7–13)
POTASSIUM SERPL-MCNC: 3.4 MMOL/L — LOW (ref 3.5–5.3)
POTASSIUM SERPL-SCNC: 3.4 MMOL/L — LOW (ref 3.5–5.3)
PROT SERPL-MCNC: 7.1 G/DL — SIGNIFICANT CHANGE UP (ref 6–8.3)
PROT UR-MCNC: NEGATIVE MG/DL — SIGNIFICANT CHANGE UP
RBC # BLD: 3.57 M/UL — LOW (ref 4.2–5.8)
RBC # FLD: 11.9 % — SIGNIFICANT CHANGE UP (ref 10.3–14.5)
RBC CASTS # UR COMP ASSIST: SIGNIFICANT CHANGE UP (ref 0–?)
SODIUM SERPL-SCNC: 128 MMOL/L — LOW (ref 135–145)
SP GR SPEC: 1.01 — SIGNIFICANT CHANGE UP (ref 1–1.04)
UROBILINOGEN FLD QL: NORMAL MG/DL — SIGNIFICANT CHANGE UP
WBC # BLD: 9.82 K/UL — SIGNIFICANT CHANGE UP (ref 3.8–10.5)
WBC # FLD AUTO: 9.82 K/UL — SIGNIFICANT CHANGE UP (ref 3.8–10.5)
WBC UR QL: SIGNIFICANT CHANGE UP (ref 0–?)

## 2018-03-29 RX ORDER — SODIUM CHLORIDE 9 MG/ML
500 INJECTION INTRAMUSCULAR; INTRAVENOUS; SUBCUTANEOUS ONCE
Qty: 0 | Refills: 0 | Status: COMPLETED | OUTPATIENT
Start: 2018-03-29 | End: 2018-03-29

## 2018-03-29 RX ADMIN — SODIUM CHLORIDE 500 MILLILITER(S): 9 INJECTION INTRAMUSCULAR; INTRAVENOUS; SUBCUTANEOUS at 19:52

## 2018-03-29 NOTE — ED PROVIDER NOTE - PHYSICAL EXAMINATION
Vitals: WNL  Gen: laying comfortably in NAD  Head: NCAT  ENT: sclerae white, anicterus, moist mucous membranes. No exudates. Neck supple, no LAD,  no carotid bruits, no JVD  CV: RRR. Audible S1 and S2. No murmurs, rubs, gallops, S3, nor S4, 2+ radial and DP pulses   Pulm: Clear to auscultation bilaterally. No wheezes, rales, or rhonchi  Abd: soft, normoactive BS x4, NTND, no rebound, no guarding, no rashes  Musculoskeletal:  no lumbar spinal tenderness, motor b/l LE 5/5, sensation equal and intact extremities x4, able to ambulate with walker, 1+ b/l peripheral edema  Skin: no lesions or scars noted  Neurologic: AAOx3, see MSK  : no CVA tenderness, rectal (chaperoned by Alix Vivas RN) - no saddle anesthesia, perirectal sensation intact, normal rectal tone

## 2018-03-29 NOTE — ED ADULT NURSE NOTE - BREATHING, MLM
BP today 136/74 P-76    Last BP 9/1/17 148/74 P- 64    Presently taking     HCTZ 12.5 mg daily added in on 9/1/17   Lisinopril 20 mg daily    Patient took her medication this am   Has not had caffeine      Please advise?    Thank you   
Continue the same  
Spoke with patient's  and informed him of DR. Quintero response     verbalized understanding     Has no other concerns at this time   
Spontaneous, unlabored and symmetrical

## 2018-03-29 NOTE — CONSULT NOTE ADULT - ATTENDING COMMENTS
Pt seen and examined. Agree with above. Drink to thirst and replete as needed. Once stable, discharge home with guerrero in place. Outpt follow-up.

## 2018-03-29 NOTE — ED ADULT TRIAGE NOTE - CHIEF COMPLAINT QUOTE
states " I am urinating a lot with incontinence at night  ever since the surgery. h/o laminectomy and spinal fusion on March 6th. sent in with possible nerve damage

## 2018-03-29 NOTE — ED ADULT NURSE NOTE - CHPI ED SYMPTOMS NEG
no burning urination/no nausea/no abdominal distension/no blood in stool/no dysuria/no chills/no hematuria/no diarrhea/no fever/no vomiting

## 2018-03-29 NOTE — ED PROVIDER NOTE - NS ED ROS FT
Constitutional: no fevers, chills  HEENT: no visual changes, no sore throat, no rhinorrhea  CV: no cp  Resp: no sob  GI: no abd pain, n/v, diarrhea/constipation  : +urinary incontinence, + urinary retention, no dysuria, hematuria  MSK: +LLE sciatic pain  skin: no rashes  neuro: no HA, no confusion

## 2018-03-29 NOTE — ED PROVIDER NOTE - OBJECTIVE STATEMENT
68yo M h/o HTN, afib, cervical stenosis s/p C3-C6 posterior spinal fusion on 3/6/18, L4-L5 stenosis p/w urinary rentention since discharge on 3/10. Pt reports he is only able to urinate about 2oz of urine each time and since last night, he has had urinary frequency but only small amts come out. one episdoe of urinary incontinence last night. Was sent in by Dr. Gaxiola for further w/u for presumed concern of cauda equina syndrome. Pt denies weakness of lower extremities, numbness/tingling of b/l LE. Does have some sciatic pain of LLE that has not worsened. Ambulates with walker 2/2 unsteady gait, not weakness. Baseline constipated - last BM yesterday night. Denies h/o BPH, prostate CA, f/c, abd pain, back pain

## 2018-03-29 NOTE — ED PROVIDER NOTE - CARE PLAN
Principal Discharge DX:	Urinary retention  Secondary Diagnosis:	Hyponatremia  Secondary Diagnosis:	JEAN PIERRE (acute kidney injury)

## 2018-03-29 NOTE — ED PROVIDER NOTE - MEDICAL DECISION MAKING DETAILS
68yo M h/o HTN, afib, cervical stenosis s/p C3-C6 posterior spinal fusion on 3/6/18, L4-L5 stenosis p/w urinary rentention since discharge on 3/10. Low suspicion for cauda equina syndrome given good strength of b/l LE, no difficulties ambulating, sensation intact, no saddle anesthesia, good rectal tone. Urinary retention likely due to prostate. Bladder scan with 2L PVR. Will contact Dr. Gaxiola to discuss his plan for care.

## 2018-03-29 NOTE — ED ADULT NURSE NOTE - PSH
H/O laminectomy    History of cardioversion  2016  History of Mohs surgery for squamous cell carcinoma in situ of skin  scalp x3-2012, 2015  S/P Mohs surgery for basal cell carcinoma  nose-2001

## 2018-03-29 NOTE — ED ADULT NURSE NOTE - OBJECTIVE STATEMENT
Patient reports urinary trouble frequently at night, patient is having frequent urination and only a small amount of urination. Patient denies any dysuria, any bowel dysfunction, or hematuria. Patient states that urine appears normal, urine and not cloudy. Patient reports having spinal laminectomy on 03/06. Patient denies any trouble breathing, any numbness/tingling, or weakness.

## 2018-03-29 NOTE — ED PROVIDER NOTE - PROGRESS NOTE DETAILS
Herbert Hook MD PGY4: Labs reviewed. Seen by orthopedics, discussed w/ Dr. Gaxiola who felt this was not cord compression. Urology consulted. Case d/w CDU ARLEEN Arriaga, who accepted for observation.

## 2018-03-29 NOTE — CONSULT NOTE ADULT - SUBJECTIVE AND OBJECTIVE BOX
HPI  This is a 68 y/o M with a hx of HTN and Afib s/p cardioversion now s/p posterior cervical fusion and laminectomy on 3/6 presenting with incomplete emptying, post-void dribbling, frequency and urgency for ~ 2 weeks.  No fevers or chills.  Reports mild dysuria intermittently.  Has had significant nocturia since this problem began post-operatively.  He has never had urinary retention previously or required catheterization previously.    ED course:  A guerrero was placed and drained ~3L of straw colored urine, after which a catheterized urine specimen was sent for culture.  A BMP was notable for hyponatremia (Na 128) and azotemia (Cr 1.5). The patient continued to produce ~ 700cc-1L urine/hr concerning for post-obstructive diuresis.  Urology is consulted for evaluation.    PAST MEDICAL & SURGICAL HISTORY:  Cervical stenosis of spine  Atrial fibrillation  Hyperlipidemia  Hypertension  H/O laminectomy  S/P Mohs surgery for basal cell carcinoma: nose-  History of Mohs surgery for squamous cell carcinoma in situ of skin: scalp x3-2012, 2015  History of cardioversion: 2016      MEDICATIONS  (STANDING):  aldactone  baby ASA (held since surgery on 3/6)    FAMILY HISTORY:  No pertinent family history in first degree relatives      Allergies: No Known Allergies    SOCIAL HISTORY: , retried, drinks 2-3 beers/day, never smoker    REVIEW OF SYSTEMS: Otherwise negative as stated in HPI    Physical Exam  Vital signs  T(C): 36.7 (18 @ 19:00), Max: 36.7 (18 @ 19:00)  HR: 78 (18 @ 19:00)  BP: 137/77 (18 @ 19:00)  SpO2: 99% (18 @ 19:00)  Guerrero: 3710 - clear yellow (1236 cc/hr)    Gen: NAD, alert and oriented to person, place, and time  Pulm: no respiratory distress	  CV: RRR on palpation  GI: Soft, NT/ND, no CVAT  : circumcised phallus, no meatal discharge, testes descended and non-tender, MCKENZIE ~ 50 g prostate, non-tender, non-indurated with internal hemorrhoids                  	  MSK: No edema    LABS:   @ 17:13  WBC 9.82  / Hct 33.7  / SCr 1.50       128<L>  |  87<L>  |  20  ----------------------------<  92  3.4<L>   |  25  |  1.50<H>    Ca    9.5      29 Mar 2018 17:13    TPro  7.1  /  Alb  3.6  /  TBili  0.5  /  DBili  x   /  AST  18  /  ALT  13  /  AlkPhos  92        Urinalysis Basic - ( 29 Mar 2018 17:30 )    Color: PLYEL / Appearance: CLEAR / S.008 / pH: 6.5  Gluc: NEGATIVE / Ketone: NEGATIVE  / Bili: NEGATIVE / Urobili: NORMAL mg/dL   Blood: NEGATIVE / Protein: NEGATIVE mg/dL / Nitrite: NEGATIVE   Leuk Esterase: TRACE / RBC: 0-2 / WBC 2-5   Sq Epi: x / Non Sq Epi: x / Bacteria: x

## 2018-03-30 DIAGNOSIS — M48.02 SPINAL STENOSIS, CERVICAL REGION: ICD-10-CM

## 2018-03-30 DIAGNOSIS — R35.8 OTHER POLYURIA: ICD-10-CM

## 2018-03-30 DIAGNOSIS — Z29.9 ENCOUNTER FOR PROPHYLACTIC MEASURES, UNSPECIFIED: ICD-10-CM

## 2018-03-30 DIAGNOSIS — I10 ESSENTIAL (PRIMARY) HYPERTENSION: ICD-10-CM

## 2018-03-30 DIAGNOSIS — I48.91 UNSPECIFIED ATRIAL FIBRILLATION: ICD-10-CM

## 2018-03-30 DIAGNOSIS — E87.6 HYPOKALEMIA: ICD-10-CM

## 2018-03-30 DIAGNOSIS — Z98.1 ARTHRODESIS STATUS: Chronic | ICD-10-CM

## 2018-03-30 LAB
ANISOCYTOSIS BLD QL: SLIGHT — SIGNIFICANT CHANGE UP
BASOPHILS # BLD AUTO: 0.02 K/UL — SIGNIFICANT CHANGE UP (ref 0–0.2)
BASOPHILS NFR BLD AUTO: 0.4 % — SIGNIFICANT CHANGE UP (ref 0–2)
BASOPHILS NFR SPEC: 0 % — SIGNIFICANT CHANGE UP (ref 0–2)
BUN SERPL-MCNC: 10 MG/DL — SIGNIFICANT CHANGE UP (ref 7–23)
BUN SERPL-MCNC: 14 MG/DL — SIGNIFICANT CHANGE UP (ref 7–23)
BUN SERPL-MCNC: 16 MG/DL — SIGNIFICANT CHANGE UP (ref 7–23)
CALCIUM SERPL-MCNC: 9 MG/DL — SIGNIFICANT CHANGE UP (ref 8.4–10.5)
CALCIUM SERPL-MCNC: 9.1 MG/DL — SIGNIFICANT CHANGE UP (ref 8.4–10.5)
CALCIUM SERPL-MCNC: 9.5 MG/DL — SIGNIFICANT CHANGE UP (ref 8.4–10.5)
CHLORIDE SERPL-SCNC: 93 MMOL/L — LOW (ref 98–107)
CHLORIDE SERPL-SCNC: 98 MMOL/L — SIGNIFICANT CHANGE UP (ref 98–107)
CHLORIDE SERPL-SCNC: 99 MMOL/L — SIGNIFICANT CHANGE UP (ref 98–107)
CO2 SERPL-SCNC: 24 MMOL/L — SIGNIFICANT CHANGE UP (ref 22–31)
CO2 SERPL-SCNC: 26 MMOL/L — SIGNIFICANT CHANGE UP (ref 22–31)
CO2 SERPL-SCNC: 27 MMOL/L — SIGNIFICANT CHANGE UP (ref 22–31)
CREAT SERPL-MCNC: 1 MG/DL — SIGNIFICANT CHANGE UP (ref 0.5–1.3)
CREAT SERPL-MCNC: 1.06 MG/DL — SIGNIFICANT CHANGE UP (ref 0.5–1.3)
CREAT SERPL-MCNC: 1.24 MG/DL — SIGNIFICANT CHANGE UP (ref 0.5–1.3)
EOSINOPHIL # BLD AUTO: 0.12 K/UL — SIGNIFICANT CHANGE UP (ref 0–0.5)
EOSINOPHIL NFR BLD AUTO: 2.1 % — SIGNIFICANT CHANGE UP (ref 0–6)
EOSINOPHIL NFR FLD: 2.6 % — SIGNIFICANT CHANGE UP (ref 0–6)
GIANT PLATELETS BLD QL SMEAR: PRESENT — SIGNIFICANT CHANGE UP
GLUCOSE SERPL-MCNC: 133 MG/DL — HIGH (ref 70–99)
GLUCOSE SERPL-MCNC: 95 MG/DL — SIGNIFICANT CHANGE UP (ref 70–99)
GLUCOSE SERPL-MCNC: 98 MG/DL — SIGNIFICANT CHANGE UP (ref 70–99)
HBA1C BLD-MCNC: 5.2 % — SIGNIFICANT CHANGE UP (ref 4–5.6)
HCT VFR BLD CALC: 33.7 % — LOW (ref 39–50)
HGB BLD-MCNC: 12 G/DL — LOW (ref 13–17)
HYPOCHROMIA BLD QL: SLIGHT — SIGNIFICANT CHANGE UP
IMM GRANULOCYTES # BLD AUTO: 0.04 # — SIGNIFICANT CHANGE UP
IMM GRANULOCYTES NFR BLD AUTO: 0.7 % — SIGNIFICANT CHANGE UP (ref 0–1.5)
LYMPHOCYTES # BLD AUTO: 1.09 K/UL — SIGNIFICANT CHANGE UP (ref 1–3.3)
LYMPHOCYTES # BLD AUTO: 19.3 % — SIGNIFICANT CHANGE UP (ref 13–44)
LYMPHOCYTES NFR SPEC AUTO: 14.2 % — SIGNIFICANT CHANGE UP (ref 13–44)
MACROCYTES BLD QL: SLIGHT — SIGNIFICANT CHANGE UP
MCHC RBC-ENTMCNC: 33.7 PG — SIGNIFICANT CHANGE UP (ref 27–34)
MCHC RBC-ENTMCNC: 35.6 % — SIGNIFICANT CHANGE UP (ref 32–36)
MCV RBC AUTO: 94.7 FL — SIGNIFICANT CHANGE UP (ref 80–100)
MONOCYTES # BLD AUTO: 1 K/UL — HIGH (ref 0–0.9)
MONOCYTES NFR BLD AUTO: 17.7 % — HIGH (ref 2–14)
MONOCYTES NFR BLD: 13.3 % — HIGH (ref 2–9)
NEUTROPHIL AB SER-ACNC: 65.5 % — SIGNIFICANT CHANGE UP (ref 43–77)
NEUTROPHILS # BLD AUTO: 3.37 K/UL — SIGNIFICANT CHANGE UP (ref 1.8–7.4)
NEUTROPHILS NFR BLD AUTO: 59.8 % — SIGNIFICANT CHANGE UP (ref 43–77)
NRBC # FLD: 0 — SIGNIFICANT CHANGE UP
PLATELET # BLD AUTO: 306 K/UL — SIGNIFICANT CHANGE UP (ref 150–400)
PLATELET COUNT - ESTIMATE: NORMAL — SIGNIFICANT CHANGE UP
PMV BLD: 9.2 FL — SIGNIFICANT CHANGE UP (ref 7–13)
POTASSIUM SERPL-MCNC: 2.9 MMOL/L — CRITICAL LOW (ref 3.5–5.3)
POTASSIUM SERPL-MCNC: 3.6 MMOL/L — SIGNIFICANT CHANGE UP (ref 3.5–5.3)
POTASSIUM SERPL-MCNC: 3.7 MMOL/L — SIGNIFICANT CHANGE UP (ref 3.5–5.3)
POTASSIUM SERPL-SCNC: 2.9 MMOL/L — CRITICAL LOW (ref 3.5–5.3)
POTASSIUM SERPL-SCNC: 3.6 MMOL/L — SIGNIFICANT CHANGE UP (ref 3.5–5.3)
POTASSIUM SERPL-SCNC: 3.7 MMOL/L — SIGNIFICANT CHANGE UP (ref 3.5–5.3)
RBC # BLD: 3.56 M/UL — LOW (ref 4.2–5.8)
RBC # FLD: 12 % — SIGNIFICANT CHANGE UP (ref 10.3–14.5)
SODIUM SERPL-SCNC: 134 MMOL/L — LOW (ref 135–145)
SODIUM SERPL-SCNC: 137 MMOL/L — SIGNIFICANT CHANGE UP (ref 135–145)
SODIUM SERPL-SCNC: 137 MMOL/L — SIGNIFICANT CHANGE UP (ref 135–145)
VARIANT LYMPHS # BLD: 4.4 % — SIGNIFICANT CHANGE UP
WBC # BLD: 5.64 K/UL — SIGNIFICANT CHANGE UP (ref 3.8–10.5)
WBC # FLD AUTO: 5.64 K/UL — SIGNIFICANT CHANGE UP (ref 3.8–10.5)

## 2018-03-30 PROCEDURE — 99221 1ST HOSP IP/OBS SF/LOW 40: CPT

## 2018-03-30 PROCEDURE — 72040 X-RAY EXAM NECK SPINE 2-3 VW: CPT | Mod: 26

## 2018-03-30 PROCEDURE — 99223 1ST HOSP IP/OBS HIGH 75: CPT

## 2018-03-30 RX ORDER — OXYCODONE AND ACETAMINOPHEN 5; 325 MG/1; MG/1
1 TABLET ORAL EVERY 6 HOURS
Qty: 0 | Refills: 0 | Status: DISCONTINUED | OUTPATIENT
Start: 2018-03-30 | End: 2018-03-31

## 2018-03-30 RX ORDER — SODIUM CHLORIDE 9 MG/ML
1000 INJECTION, SOLUTION INTRAVENOUS
Qty: 0 | Refills: 0 | Status: DISCONTINUED | OUTPATIENT
Start: 2018-03-30 | End: 2018-03-30

## 2018-03-30 RX ORDER — METOPROLOL TARTRATE 50 MG
50 TABLET ORAL
Qty: 0 | Refills: 0 | Status: DISCONTINUED | OUTPATIENT
Start: 2018-03-30 | End: 2018-03-31

## 2018-03-30 RX ORDER — HEPARIN SODIUM 5000 [USP'U]/ML
5000 INJECTION INTRAVENOUS; SUBCUTANEOUS EVERY 8 HOURS
Qty: 0 | Refills: 0 | Status: DISCONTINUED | OUTPATIENT
Start: 2018-03-30 | End: 2018-03-31

## 2018-03-30 RX ORDER — LISINOPRIL 2.5 MG/1
40 TABLET ORAL DAILY
Qty: 0 | Refills: 0 | Status: DISCONTINUED | OUTPATIENT
Start: 2018-03-30 | End: 2018-03-31

## 2018-03-30 RX ORDER — AMLODIPINE BESYLATE 2.5 MG/1
10 TABLET ORAL DAILY
Qty: 0 | Refills: 0 | Status: DISCONTINUED | OUTPATIENT
Start: 2018-03-30 | End: 2018-03-31

## 2018-03-30 RX ORDER — POTASSIUM CHLORIDE 20 MEQ
10 PACKET (EA) ORAL
Qty: 0 | Refills: 0 | Status: COMPLETED | OUTPATIENT
Start: 2018-03-30 | End: 2018-03-30

## 2018-03-30 RX ORDER — POTASSIUM CHLORIDE 20 MEQ
40 PACKET (EA) ORAL ONCE
Qty: 0 | Refills: 0 | Status: COMPLETED | OUTPATIENT
Start: 2018-03-30 | End: 2018-03-30

## 2018-03-30 RX ORDER — DRONEDARONE 400 MG/1
400 TABLET, FILM COATED ORAL
Qty: 0 | Refills: 0 | Status: DISCONTINUED | OUTPATIENT
Start: 2018-03-30 | End: 2018-03-31

## 2018-03-30 RX ADMIN — HEPARIN SODIUM 5000 UNIT(S): 5000 INJECTION INTRAVENOUS; SUBCUTANEOUS at 15:01

## 2018-03-30 RX ADMIN — Medication 100 MILLIEQUIVALENT(S): at 08:54

## 2018-03-30 RX ADMIN — SODIUM CHLORIDE 200 MILLILITER(S): 9 INJECTION, SOLUTION INTRAVENOUS at 06:07

## 2018-03-30 RX ADMIN — Medication 50 MILLIGRAM(S): at 18:14

## 2018-03-30 RX ADMIN — DRONEDARONE 400 MILLIGRAM(S): 400 TABLET, FILM COATED ORAL at 18:14

## 2018-03-30 RX ADMIN — SODIUM CHLORIDE 500 MILLILITER(S): 9 INJECTION INTRAMUSCULAR; INTRAVENOUS; SUBCUTANEOUS at 00:03

## 2018-03-30 RX ADMIN — Medication 100 MILLIEQUIVALENT(S): at 12:18

## 2018-03-30 RX ADMIN — Medication 100 MILLIEQUIVALENT(S): at 10:40

## 2018-03-30 RX ADMIN — HEPARIN SODIUM 5000 UNIT(S): 5000 INJECTION INTRAVENOUS; SUBCUTANEOUS at 23:17

## 2018-03-30 RX ADMIN — Medication 40 MILLIEQUIVALENT(S): at 07:50

## 2018-03-30 RX ADMIN — SODIUM CHLORIDE 250 MILLILITER(S): 9 INJECTION, SOLUTION INTRAVENOUS at 04:09

## 2018-03-30 RX ADMIN — SODIUM CHLORIDE 200 MILLILITER(S): 9 INJECTION, SOLUTION INTRAVENOUS at 03:31

## 2018-03-30 NOTE — PROVIDER CONTACT NOTE (CRITICAL VALUE NOTIFICATION) - ASSESSMENT
Patient alert and oriented x 4 able to make needs known, no complaints of chest pains, no respiratory distress noted.

## 2018-03-30 NOTE — H&P ADULT - NSHPREVIEWOFSYSTEMS_GEN_ALL_CORE
REVIEW OF SYSTEMS:    CONSTITUTIONAL: No weakness, fevers or chills  EYES/ENT: No visual changes;  No vertigo or throat pain   NECK: No pain or stiffness  RESPIRATORY: No cough, wheezing, hemoptysis; No shortness of breath  CARDIOVASCULAR: No chest pain or palpitations  GASTROINTESTINAL: No abdominal or epigastric pain. No nausea, vomiting, or hematemesis; No diarrhea or constipation. No melena or hematochezia.  GENITOURINARY: No dysuria or hematuria. (+) Urinary urgency and nocturia, incomplete voiding   NEUROLOGICAL: No numbness or weakness  SKIN: No itching, burning, rashes, or lesions   All other review of systems is negative unless indicated above.

## 2018-03-30 NOTE — H&P ADULT - ASSESSMENT
67M hx of HTN and Afib s/p cardioversion now s/p posterior cervical fusion and laminectomy on 3/6 presents to [facility] for urinary retention. 67M hx of HTN and Afib s/p cardioversion now s/p posterior cervical fusion and laminectomy on 3/6 presents to [facility] for urinary retention. Being admitted for monitoring BMP and BP in the setting of post-obstructive diuresis

## 2018-03-30 NOTE — ED CDU PROVIDER SUBSEQUENT DAY NOTE - MEDICAL DECISION MAKING DETAILS
66 y/o male w/ post obstructive diuresis- Pt put out >7L in less than 24 hours, K on CMP was 2.9.  Servando replete K and admit to medicine.

## 2018-03-30 NOTE — H&P ADULT - PROBLEM SELECTOR PLAN 5
s/p cervical fusion and laminectomy, pain control prn, plan as per ortho. Painc ontrol PRN with only 1 percocet every 6h instead

## 2018-03-30 NOTE — ED CDU PROVIDER SUBSEQUENT DAY NOTE - ATTENDING CONTRIBUTION TO CARE
Attending Note (Mera): patient with post obstructive diuresis. urine output greater than 7L in less than 24 hours.  now with hypok.  urology consult appreciated.  will admit for further monitoring of output and k replacement.

## 2018-03-30 NOTE — ED CDU PROVIDER SUBSEQUENT DAY NOTE - HISTORY
66 y/o male pmh htn, afib, cervical stenosis s/p spinal fusion C3-C6 on March 6 2018. Pt admit sto difficulty urinating after the operation. Admits to frequency but only putting small amount of urine out every time. Pt admits to urinary retention x1 day prompting ER visit. Pt denies numbness, tingling, weakness, difficulty walking, dizziness, syncope, fever or chills. In ER, pt found to have >1L in bladder on POCUS, guerrero was placed and pt put out around 3L of fluid. sent to CDU for post obstructive diuresis.

## 2018-03-30 NOTE — H&P ADULT - HISTORY OF PRESENT ILLNESS
67M hx of HTN and Afib s/p cardioversion now s/p posterior cervical fusion and laminectomy on 3/6 presents to [facility] for urinary retention. Since his spinal surgery patient, has had incomplete emptying, post-void dribbling, frequency and urgency for two weeks. The patient also has been experiencing post-operative nocturia. No issues with leg weakness or bowel movements.  No fevers or chills. This has never happened before.     In the ED patient received 50 mEq Potassium supplementation and 1 liter total of NS. Richardson was placed in the ED, and patient has produced 7L in 24h plus 1.3 liters today. 67M hx of HTN and Afib s/p cardioversion now s/p posterior cervical fusion and laminectomy on 3/6 presents to [facility] for urinary retention. Since his spinal surgery patient, has had incomplete emptying, post-void dribbling, frequency and urgency for two weeks. The patient also has been experiencing post-operative nocturia. No issues with leg weakness or bowel movements.  No fevers or chills. This has never happened before. Of note, in the post-operative period patient has been taking about 5-8 percocet tablets per day to alleviate pain.     In the ED patient received 50 mEq Potassium supplementation and 1 liter total of NS. Richardson was placed in the ED, and patient has produced 7L in 24h plus 1.3 liters today.

## 2018-03-30 NOTE — ED CDU PROVIDER INITIAL DAY NOTE - PSH
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

## 2018-03-30 NOTE — ED CDU PROVIDER INITIAL DAY NOTE - MEDICAL DECISION MAKING DETAILS
Monitor urine output closely; adjust IV hydration per Urology recommendations for post-obstructive diuresis, trend BMP every 6 hours, Urology team reassessment, Ortho team reassessment, general observation and reassessment.

## 2018-03-30 NOTE — ED CDU PROVIDER INITIAL DAY NOTE - GENITOURINARY BLADDER
non-distended/Richardson catheter in place, draining clear blood-tinged urine to bedside drainage bag./non-tender

## 2018-03-30 NOTE — ED CDU PROVIDER INITIAL DAY NOTE - PROGRESS NOTE DETAILS
Urine output being measured Q 2 hours and D5NS IV fluids rate being adjusted accordingly as per Urology recommendations.  Pt. resting comfortably in the interim; stable at present; no c/o or issues in the interim.  Will continue to monitor.  Pt. will be signed out to day CDU PA and attending at 0700 hrs.

## 2018-03-30 NOTE — ED CDU PROVIDER SUBSEQUENT DAY NOTE - PSH
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

## 2018-03-30 NOTE — H&P ADULT - PROBLEM SELECTOR PLAN 6
IMPROVE VTE Individual Risk Assessment, Score = 1, Defer DVT ppx for now           RISK                                                          Points  [  ] Previous VTE                                               3  [  ] Thrombophilia                                            2  [  ] Lower limb paresis/paralysis                    2    [  ] Active Cancer (in last 6 months)              2   [  ] Immobilization > 24 hrs                             1  [  ] ICU/CCU stay > 24 hours                            1  [ x ] Age > 60                                                        1                                            Total Score ____1_____

## 2018-03-30 NOTE — CONSULT NOTE ADULT - SUBJECTIVE AND OBJECTIVE BOX
This is a 68 y/o M with a hx of HTN and Afib s/p cardioversion now s/p posterior cervical fusion and laminectomy on 3/6 presenting with incomplete emptying, post-void dribbling, frequency and urgency for ~ 2 weeks.  No fevers or chills.  Reports mild dysuria intermittently.  Has had significant nocturia since this problem began post-operatively.  He has never had urinary retention previously or required catheterization previously.    ED course:  A guerrero was placed and drained ~3L of straw colored urine, after which a catheterized urine specimen was sent for culture.  A BMP was notable for hyponatremia (Na 128) and azotemia (Cr 1.5). The patient continued to produce ~ 700cc-1L urine/hr concerning for post-obstructive diuresis.  Urology is consulted for evaluation.       sp C3-5 PSF on 3/6, Denies new HS/LOC. Denies pain/injury. Denies numbness/tingling/paresthesias/weakness. Denies bowel incontinence. Denies fevers/chills. No other complaints at this time.    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: scalp x3-,   History of cardioversion: 2016                            11.9   9.82  )-----------( 319      ( 29 Mar 2018 17:13 )             33.7       29 Mar 2018 23:55    134    |  93     |  16     ----------------------------<  95     3.6     |  27     |  1.24     Ca    9.5        29 Mar 2018 23:55    TPro  7.1    /  Alb  3.6    /  TBili  0.5    /  DBili  x      /  AST  18     /  ALT  13     /  AlkPhos  92     29 Mar 2018 17:13          Urinalysis Basic - ( 29 Mar 2018 17:30 )    Color: PLYEL / Appearance: CLEAR / S.008 / pH: 6.5  Gluc: NEGATIVE / Ketone: NEGATIVE  / Bili: NEGATIVE / Urobili: NORMAL mg/dL   Blood: NEGATIVE / Protein: NEGATIVE mg/dL / Nitrite: NEGATIVE   Leuk Esterase: TRACE / RBC: 0-2 / WBC 2-5   Sq Epi: x / Non Sq Epi: x / Bacteria: x        Vital Signs Last 24 Hrs  T(C): 36.7 (18 @ 00:07), Max: 36.7 (18 @ 19:00)  T(F): 98 (18 @ 00:07), Max: 98 (18 @ 19:00)  HR: 96 (18 @ 00:07) (78 - 96)  BP: 133/78 (18 @ 00:07) (118/75 - 137/77)  BP(mean): --  RR: 16 (18 @ 00:07) (16 - 18)  SpO2: 99% (18 @ 00:07) (99% - 99%)    Gen: NAD    Spine PE:  incision well healed  No gross deformity  No midnline TTP C/T/L/S spine  No bony step offs  No paraspinal muscle ttp/hypertonicity   Negative Straight leg raise  Negative clonus  Negative babinski  Negative sarmiento  No saddle anesthesia    Motor:                   C5                C6              C7               C8           T1   R            5/5                5/5            5/5             5/5          5/5  L             5/5               5/5             5/5             5/5          5/5                L2             L3             L4               L5            S1  R         5/5           5/5          5/5             5/5           5/5  L          5/5          5/5           5/5             5/5           5/5    Sensory:            C5         C6         C7      C8       T1        (0=absent, 1=impaired, 2=normal, NT=not testable)  R         2            2           2        2         2  L          2            2           2        2         2               L2          L3         L4      L5       S1         (0=absent, 1=impaired, 2=normal, NT=not testable)  R         2            2            2        2        2  L          2            2           2        2         2      A/P: 67y Male with urinary incontinence, with prostatic enlargement and urinary retention after spinal surgery, now in post-obstructive diuresis with secondary hyponatremia and azotemia.  no acute ortho spine intervention  Pain control  WBAT with assistive devices as needed  FU Labs/imaging  SCDs  follow up as outpatient with Dr. Gaxiola

## 2018-03-30 NOTE — H&P ADULT - PROBLEM SELECTOR PLAN 1
Patient with postobstructive diuresis. Presented in urinary retention, and now s/p guerrero, producing 8 liters. Likely drug induced urinary retention (see HPI - percocets?) that exacerbated likely pre-existing BPH.   - Calculate 24 hour requirements of fluids and replenish 50% of UOP losses with 0.45% NS   - Monitor SCr and K+  - c/w guerrero, trial to void as urine output declines  - Recs as per urology

## 2018-03-30 NOTE — ED CDU PROVIDER INITIAL DAY NOTE - OBJECTIVE STATEMENT
68yo M h/o HTN, afib, cervical stenosis s/p C3-C6 posterior spinal fusion on 3/6/18, L4-L5 stenosis p/w urinary rentention since discharge on 3/10. Pt reports he is only able to urinate about 2oz of urine each time and since last night, he has had urinary frequency but only small amts come out. one episdoe of urinary incontinence last night. Was sent in by Dr. Gaxiola for further w/u for presumed concern of cauda equina syndrome. Pt denies weakness of lower extremities, numbness/tingling of b/l LE. Does have some sciatic pain of LLE that has not worsened. Ambulates with walker 2/2 unsteady gait, not weakness. Baseline constipated - last BM yesterday night. Denies h/o BPH, prostate CA, f/c, abd pain, back pain    CDU ARLEEN Arriaga Note-----  66 yo male with PMH of atrial fibrillation (hx/o cardioversion; only on aspirin at present), HTN, basal and squamous cell skin cancers, and cervical stenosis, presented to the ED as per above.  Pt. found to be in urinary retention; Richardson placed and large volume urine has been draining via Richardson since.  Pt was hyponatremic (128) and with elevated creatinine (1.50); Urology was consulted as was Orthopedics service.  Pt. was placed in CDU for plan:  Monitor urine output closely; adjust IV hydration per Urology recommendations for post-obstructive diuresis, trend BMP every 6 hours, Urology team reassessment, Ortho team reassessment, general observation and reassessment.  On CDU arrival, pt has no c/o.  Pt. denies pain/discomfort/SOB/chest pain/discomfort.  Pt. had large volume bloody urine (clear bloody) in Richardson bag; 1200 milliliters measured which is over an approximate 3 hours time frame, equating to ~400 mL per hour output over the past 3 hours.  IV hydration being adjusted per Urology recommedations to correct for fluid losses.  CDU plan d/w pt; pt verbalizes agreement with plan.  Repeat CBC performed just prior to midnight showed improved sodium (134) and improved creatinine (1.24).

## 2018-03-30 NOTE — ED CDU PROVIDER INITIAL DAY NOTE - INDICATION FOR OBSERVATION
Other/Monitor urine output closely; adjust IV hydration per Urology recommendations for post-obstructive diuresis, trend BMP every 6 hours, Urology team reassessment, Ortho team reassessment, general observation and reassessment.

## 2018-03-30 NOTE — PATIENT PROFILE ADULT. - VISION (WITH CORRECTIVE LENSES IF THE PATIENT USUALLY WEARS THEM):
Normal vision: sees adequately in most situations; can see medication labels, newsprint/wears glasses; not with him

## 2018-03-30 NOTE — H&P ADULT - NSHPPHYSICALEXAM_GEN_ALL_CORE
Vital Signs Last 24 Hrs  T(C): 37 (30 Mar 2018 10:37), Max: 37 (30 Mar 2018 10:37)  T(F): 98.6 (30 Mar 2018 10:37), Max: 98.6 (30 Mar 2018 10:37)  HR: 84 (30 Mar 2018 10:37) (78 - 96)  BP: 125/73 (30 Mar 2018 10:37) (118/75 - 137/77)  BP(mean): --  RR: 17 (30 Mar 2018 10:37) (16 - 18)  SpO2: 98% (30 Mar 2018 10:37) (98% - 100%)    General: NAD, non-toxic appearing, speaking in full sentences   HEENT: EOMI, PERRLA, no conjunctival pallor, MMM, no JVD, no thyromegaly, neck supple, trachea midline  CV: S1S2 RRR no MRG  Lungs: CTA BL  Abdomen: soft NTND +BS   : (+) guerrero with straw colored urine   Extremities: No CC +WWP, +1 edema   Skin/MSK: No rashes, preserved ROM on active & passive movement  Neuro: AAOx3, no focal deficits (5/5 strength all extremities), no sensory deficits

## 2018-03-30 NOTE — H&P ADULT - NSHPLABSRESULTS_GEN_ALL_CORE
CBC Full  -  ( 30 Mar 2018 05:30 )  WBC Count : 5.64 K/uL  Hemoglobin : 12.0 g/dL  Hematocrit : 33.7 %  Platelet Count - Automated : 306 K/uL  Mean Cell Volume : 94.7 fL  Mean Cell Hemoglobin : 33.7 pg  Mean Cell Hemoglobin Concentration : 35.6 %  Auto Neutrophil # : 3.37 K/uL  Auto Lymphocyte # : 1.09 K/uL  Auto Monocyte # : 1.00 K/uL  Auto Eosinophil # : 0.12 K/uL  Auto Basophil # : 0.02 K/uL  Auto Neutrophil % : 59.8 %  Auto Lymphocyte % : 19.3 %  Auto Monocyte % : 17.7 %  Auto Eosinophil % : 2.1 %  Auto Basophil % : 0.4 %    03-30    137  |  98  |  14  ----------------------------<  133<H>  2.9<LL>   |  26  |  1.06    Ca    9.0      30 Mar 2018 05:30    TPro  7.1  /  Alb  3.6  /  TBili  0.5  /  DBili  x   /  AST  18  /  ALT  13  /  AlkPhos  92  03-29    < from: Xray Cervical Spine AP, Lat, Dens Max 3 View (03.30.18 @ 02:24) >  Unchanged configuration and position of the previously seen C3-C6   posterior fusion hardware consisting of pedicle screws with   interconnecting rods without evidence for complication. Multilevel   laminectomy defects also noted.  Straightened cervical lordosis otherwise no compression fractures or   spondylolisthesis.  C5-C6 and C6-C7 disc space narrowing with disc margin osteophyte   formation.  Prevertebral soft tissues and predental interval within normal limits.  Atlantoaxial and posterior facet alignment maintained.  Intact odontoid.  Generalized osteopenia otherwise no discrete lytic or blastic lesions.  < end of copied text >

## 2018-03-30 NOTE — ED CDU PROVIDER SUBSEQUENT DAY NOTE - PROGRESS NOTE DETAILS
Pt resting comfortably in the interim; no issues or c/o thus far.  Will continue to monitor / reassess; pt will be signed out to day CDU PA and attending at 0700 hrs.

## 2018-03-31 ENCOUNTER — TRANSCRIPTION ENCOUNTER (OUTPATIENT)
Age: 68
End: 2018-03-31

## 2018-03-31 VITALS
RESPIRATION RATE: 18 BRPM | OXYGEN SATURATION: 95 % | SYSTOLIC BLOOD PRESSURE: 118 MMHG | TEMPERATURE: 99 F | DIASTOLIC BLOOD PRESSURE: 76 MMHG | HEART RATE: 72 BPM

## 2018-03-31 DIAGNOSIS — E83.42 HYPOMAGNESEMIA: ICD-10-CM

## 2018-03-31 LAB
ALBUMIN SERPL ELPH-MCNC: 2.8 G/DL — LOW (ref 3.3–5)
ALP SERPL-CCNC: 76 U/L — SIGNIFICANT CHANGE UP (ref 40–120)
ALT FLD-CCNC: 11 U/L — SIGNIFICANT CHANGE UP (ref 4–41)
AST SERPL-CCNC: 15 U/L — SIGNIFICANT CHANGE UP (ref 4–40)
BACTERIA UR CULT: SIGNIFICANT CHANGE UP
BASOPHILS # BLD AUTO: 0.03 K/UL — SIGNIFICANT CHANGE UP (ref 0–0.2)
BASOPHILS NFR BLD AUTO: 0.5 % — SIGNIFICANT CHANGE UP (ref 0–2)
BILIRUB SERPL-MCNC: 0.4 MG/DL — SIGNIFICANT CHANGE UP (ref 0.2–1.2)
BUN SERPL-MCNC: 11 MG/DL — SIGNIFICANT CHANGE UP (ref 7–23)
BUN SERPL-MCNC: 9 MG/DL — SIGNIFICANT CHANGE UP (ref 7–23)
CALCIUM SERPL-MCNC: 8.6 MG/DL — SIGNIFICANT CHANGE UP (ref 8.4–10.5)
CALCIUM SERPL-MCNC: 8.8 MG/DL — SIGNIFICANT CHANGE UP (ref 8.4–10.5)
CHLORIDE SERPL-SCNC: 96 MMOL/L — LOW (ref 98–107)
CHLORIDE SERPL-SCNC: 96 MMOL/L — LOW (ref 98–107)
CO2 SERPL-SCNC: 26 MMOL/L — SIGNIFICANT CHANGE UP (ref 22–31)
CO2 SERPL-SCNC: 28 MMOL/L — SIGNIFICANT CHANGE UP (ref 22–31)
CREAT SERPL-MCNC: 0.9 MG/DL — SIGNIFICANT CHANGE UP (ref 0.5–1.3)
CREAT SERPL-MCNC: 0.94 MG/DL — SIGNIFICANT CHANGE UP (ref 0.5–1.3)
EOSINOPHIL # BLD AUTO: 0.18 K/UL — SIGNIFICANT CHANGE UP (ref 0–0.5)
EOSINOPHIL NFR BLD AUTO: 3 % — SIGNIFICANT CHANGE UP (ref 0–6)
GLUCOSE SERPL-MCNC: 85 MG/DL — SIGNIFICANT CHANGE UP (ref 70–99)
GLUCOSE SERPL-MCNC: 99 MG/DL — SIGNIFICANT CHANGE UP (ref 70–99)
HCT VFR BLD CALC: 31.5 % — LOW (ref 39–50)
HGB BLD-MCNC: 11 G/DL — LOW (ref 13–17)
IMM GRANULOCYTES # BLD AUTO: 0.04 # — SIGNIFICANT CHANGE UP
IMM GRANULOCYTES NFR BLD AUTO: 0.7 % — SIGNIFICANT CHANGE UP (ref 0–1.5)
LYMPHOCYTES # BLD AUTO: 1.53 K/UL — SIGNIFICANT CHANGE UP (ref 1–3.3)
LYMPHOCYTES # BLD AUTO: 25.8 % — SIGNIFICANT CHANGE UP (ref 13–44)
MAGNESIUM SERPL-MCNC: 1 MG/DL — CRITICAL LOW (ref 1.6–2.6)
MAGNESIUM SERPL-MCNC: 1.4 MG/DL — LOW (ref 1.6–2.6)
MCHC RBC-ENTMCNC: 33.7 PG — SIGNIFICANT CHANGE UP (ref 27–34)
MCHC RBC-ENTMCNC: 34.9 % — SIGNIFICANT CHANGE UP (ref 32–36)
MCV RBC AUTO: 96.6 FL — SIGNIFICANT CHANGE UP (ref 80–100)
MONOCYTES # BLD AUTO: 1.08 K/UL — HIGH (ref 0–0.9)
MONOCYTES NFR BLD AUTO: 18.2 % — HIGH (ref 2–14)
NEUTROPHILS # BLD AUTO: 3.07 K/UL — SIGNIFICANT CHANGE UP (ref 1.8–7.4)
NEUTROPHILS NFR BLD AUTO: 51.8 % — SIGNIFICANT CHANGE UP (ref 43–77)
NRBC # FLD: 0 — SIGNIFICANT CHANGE UP
PHOSPHATE SERPL-MCNC: 3.2 MG/DL — SIGNIFICANT CHANGE UP (ref 2.5–4.5)
PHOSPHATE SERPL-MCNC: 3.4 MG/DL — SIGNIFICANT CHANGE UP (ref 2.5–4.5)
PLATELET # BLD AUTO: 262 K/UL — SIGNIFICANT CHANGE UP (ref 150–400)
PMV BLD: 9.2 FL — SIGNIFICANT CHANGE UP (ref 7–13)
POTASSIUM SERPL-MCNC: 3.3 MMOL/L — LOW (ref 3.5–5.3)
POTASSIUM SERPL-MCNC: 3.6 MMOL/L — SIGNIFICANT CHANGE UP (ref 3.5–5.3)
POTASSIUM SERPL-SCNC: 3.3 MMOL/L — LOW (ref 3.5–5.3)
POTASSIUM SERPL-SCNC: 3.6 MMOL/L — SIGNIFICANT CHANGE UP (ref 3.5–5.3)
PROT SERPL-MCNC: 6 G/DL — SIGNIFICANT CHANGE UP (ref 6–8.3)
RBC # BLD: 3.26 M/UL — LOW (ref 4.2–5.8)
RBC # FLD: 12.1 % — SIGNIFICANT CHANGE UP (ref 10.3–14.5)
SODIUM SERPL-SCNC: 136 MMOL/L — SIGNIFICANT CHANGE UP (ref 135–145)
SODIUM SERPL-SCNC: 136 MMOL/L — SIGNIFICANT CHANGE UP (ref 135–145)
SPECIMEN SOURCE: SIGNIFICANT CHANGE UP
WBC # BLD: 5.93 K/UL — SIGNIFICANT CHANGE UP (ref 3.8–10.5)
WBC # FLD AUTO: 5.93 K/UL — SIGNIFICANT CHANGE UP (ref 3.8–10.5)

## 2018-03-31 PROCEDURE — 99239 HOSP IP/OBS DSCHRG MGMT >30: CPT

## 2018-03-31 RX ORDER — TAMSULOSIN HYDROCHLORIDE 0.4 MG/1
1 CAPSULE ORAL
Qty: 30 | Refills: 0 | OUTPATIENT
Start: 2018-03-31 | End: 2018-04-29

## 2018-03-31 RX ORDER — MAGNESIUM SULFATE 500 MG/ML
2 VIAL (ML) INJECTION ONCE
Qty: 0 | Refills: 0 | Status: COMPLETED | OUTPATIENT
Start: 2018-03-31 | End: 2018-03-31

## 2018-03-31 RX ORDER — POTASSIUM CHLORIDE 20 MEQ
40 PACKET (EA) ORAL ONCE
Qty: 0 | Refills: 0 | Status: COMPLETED | OUTPATIENT
Start: 2018-03-31 | End: 2018-03-31

## 2018-03-31 RX ORDER — ASPIRIN/CALCIUM CARB/MAGNESIUM 324 MG
1 TABLET ORAL
Qty: 0 | Refills: 0 | COMMUNITY

## 2018-03-31 RX ORDER — TAMSULOSIN HYDROCHLORIDE 0.4 MG/1
0.4 CAPSULE ORAL AT BEDTIME
Qty: 0 | Refills: 0 | Status: DISCONTINUED | OUTPATIENT
Start: 2018-03-31 | End: 2018-03-31

## 2018-03-31 RX ADMIN — HEPARIN SODIUM 5000 UNIT(S): 5000 INJECTION INTRAVENOUS; SUBCUTANEOUS at 06:00

## 2018-03-31 RX ADMIN — AMLODIPINE BESYLATE 10 MILLIGRAM(S): 2.5 TABLET ORAL at 05:59

## 2018-03-31 RX ADMIN — Medication 50 GRAM(S): at 14:29

## 2018-03-31 RX ADMIN — Medication 40 MILLIEQUIVALENT(S): at 10:20

## 2018-03-31 RX ADMIN — Medication 50 MILLIGRAM(S): at 05:59

## 2018-03-31 RX ADMIN — DRONEDARONE 400 MILLIGRAM(S): 400 TABLET, FILM COATED ORAL at 05:59

## 2018-03-31 RX ADMIN — Medication 50 GRAM(S): at 10:20

## 2018-03-31 RX ADMIN — LISINOPRIL 40 MILLIGRAM(S): 2.5 TABLET ORAL at 05:59

## 2018-03-31 RX ADMIN — HEPARIN SODIUM 5000 UNIT(S): 5000 INJECTION INTRAVENOUS; SUBCUTANEOUS at 14:30

## 2018-03-31 NOTE — DISCHARGE NOTE ADULT - MEDICATION SUMMARY - MEDICATIONS TO STOP TAKING
I will STOP taking the medications listed below when I get home from the hospital:    oxyCODONE 5 mg oral tablet  -- 1 -2 tab(s) by mouth every 3 hours, As needed, Moderate Pain (4 - 6). MDD:8    chlorthalidone 25 mg oral tablet  -- 1 tab(s) by mouth once a day

## 2018-03-31 NOTE — DISCHARGE NOTE ADULT - CARE PROVIDER_API CALL
Gaston Gaxiola), Orthopaedic Surgery  [address] [place] [address]  Phone: [phone]  Fax: [phone] Gaston Gaxiola), Orthopaedic Surgery  [address] [address]  Phone: [phone]  Fax: [phone]    Chelsi Ling), Urology  [address] [place] [address]  Phone: [phone]  Fax: [phone]

## 2018-03-31 NOTE — PROGRESS NOTE ADULT - SUBJECTIVE AND OBJECTIVE BOX
Subjective Post obstructive diuresis appears to be resolved. No issues.     Objective    Vital signs  T(F): , Max: 99.2 (03-30-18 @ 19:40)  HR: 72 (03-31-18 @ 12:58)  BP: 118/76 (03-31-18 @ 12:58)  SpO2: 95% (03-31-18 @ 12:58)  Wt(kg): --    Output     03-30 @ 07:01  -  03-31 @ 07:00  --------------------------------------------------------  IN: 0 mL / OUT: 3120 mL / NET: -3120 mL    03-31 @ 07:01  -  03-31 @ 13:33  --------------------------------------------------------  IN: 0 mL / OUT: 1000 mL / NET: -1000 mL    Richardson 900    Gen NAD  Abd Soft. NT. ND   Richardson draining yellow urine     Labs      03-31 @ 12:13    WBC --    / Hct --    / SCr 0.90     03-31 @ 05:33    WBC 5.93  / Hct 31.5  / SCr 0.94
Subjective:  Patient urine output trending down. Hypokalemic this morning, currently being repleted. Sodium normalizing. Patient encouraged to drink to thirst.    Objectives:  T(C): 36.4 (18 @ 04:57), Max: 36.8 (18 @ 01:30)  HR: 89 (18 @ 04:57) (89 - 96)  BP: 126/78 (18 @ 04:57) (126/78 - 133/78)  RR: 18 (18 @ 04:57) (16 - 18)  SpO2: 100% (18 @ 04:57) (99% - 100%)  Wt(kg): --     @ 07:01  -   @ 07:00  --------------------------------------------------------  IN:    dextrose 5% + sodium chloride 0.9%: 900 mL    IV PiggyBack: 800 mL  Total IN: 1700 mL    OUT:    Indwelling Catheter - Urethral: 8710 mL  Total OUT: 8710 mL    Total NET: -7010 mL          Physcial Exam  GENERAL: NAD, well-developed  GENITOURINARY: Draining clear yellow urine.      LABS:                        12.0   5.64  )-----------( 306      ( 30 Mar 2018 05:30 )             33.7         137  |  98  |  14  ----------------------------<  133<H>  2.9<LL>   |  26  |  1.06    Ca    9.0      30 Mar 2018 05:30    TPro  7.1  /  Alb  3.6  /  TBili  0.5  /  DBili  x   /  AST  18  /  ALT  13  /  AlkPhos  92      CAPILLARY BLOOD GLUCOSE          CAPILLARY BLOOD GLUCOSE        Urinalysis Basic - ( 29 Mar 2018 17:30 )    Color: PLYEL / Appearance: CLEAR / S.008 / pH: 6.5  Gluc: NEGATIVE / Ketone: NEGATIVE  / Bili: NEGATIVE / Urobili: NORMAL mg/dL   Blood: NEGATIVE / Protein: NEGATIVE mg/dL / Nitrite: NEGATIVE   Leuk Esterase: TRACE / RBC: 0-2 / WBC 2-5   Sq Epi: x / Non Sq Epi: x / Bacteria: x
Patient is a 67y old  Male who presents with a chief complaint of I'm having cervical laminectomy, severe stenosis (31 Mar 2018 12:30)      SUBJECTIVE / OVERNIGHT EVENTS: No acute events overnight. 3120 urine output in last 24 hours, so far 1 liter today. No chest pain, SOB, N/V/D    MEDICATIONS  (STANDING):  amLODIPine   Tablet 10 milliGRAM(s) Oral daily  dronedarone 400 milliGRAM(s) Oral two times a day  heparin  Injectable 5000 Unit(s) SubCutaneous every 8 hours  lisinopril 40 milliGRAM(s) Oral daily  magnesium sulfate  IVPB 2 Gram(s) IV Intermittent once  metoprolol tartrate 50 milliGRAM(s) Oral two times a day  tamsulosin 0.4 milliGRAM(s) Oral at bedtime    MEDICATIONS  (PRN):  oxyCODONE    5 mG/acetaminophen 325 mG 1 Tablet(s) Oral every 6 hours PRN Severe Pain (7 - 10)      T(C): 37.1 (18 @ 12:58), Max: 37.3 (18 @ 19:40)  HR: 72 (18 @ 12:58) (72 - 95)  BP: 118/76 (18 @ 12:58) (118/76 - 136/85)  RR: 18 (18 @ 12:58) (17 - 18)  SpO2: 95% (18 @ 12:58) (95% - 100%)  CAPILLARY BLOOD GLUCOSE        I&O's Summary    30 Mar 2018 07:  -  31 Mar 2018 07:00  --------------------------------------------------------  IN: 0 mL / OUT: 3120 mL / NET: -3120 mL    31 Mar 2018 07:  -  31 Mar 2018 14:17  --------------------------------------------------------  IN: 0 mL / OUT: 1000 mL / NET: -1000 mL        PHYSICAL EXAM:  GENERAL: no apparent distress, on room air  EYES: sclera clear b/l  CHEST/LUNG: Clear to auscultation bilaterally; No wheezing or crackles  HEART: S1/S2, no murmurs  ABDOMEN: Soft, Nontender, Nondistended; Bowel sounds present  EXTREMITIES:  No clubbing, cyanosis, or edema  NEUROLOGY: awake, alert, responds to Qs appropriately, no gross deficits  : + guerrero with bag filled with clear yellow urine    LABS:                        11.0   5.93  )-----------( 262      ( 31 Mar 2018 05:33 )             31.5         136  |  96<L>  |  9   ----------------------------<  99  3.6   |  28  |  0.90    Ca    8.8      31 Mar 2018 12:13  Phos  3.4       Mg     1.0         TPro  6.0  /  Alb  2.8<L>  /  TBili  0.4  /  DBili  x   /  AST  15  /  ALT  11  /  AlkPhos  76            Urinalysis Basic - ( 29 Mar 2018 17:30 )    Color: PLYEL / Appearance: CLEAR / S.008 / pH: 6.5  Gluc: NEGATIVE / Ketone: NEGATIVE  / Bili: NEGATIVE / Urobili: NORMAL mg/dL   Blood: NEGATIVE / Protein: NEGATIVE mg/dL / Nitrite: NEGATIVE   Leuk Esterase: TRACE / RBC: 0-2 / WBC 2-5   Sq Epi: x / Non Sq Epi: x / Bacteria: x        RADIOLOGY & ADDITIONAL TESTS:

## 2018-03-31 NOTE — PROGRESS NOTE ADULT - ASSESSMENT
This is a 66 y/o M with prostatic enlargement and urinary retention after spinal surgery, now in post-obstructive diuresis with secondary hyponatremia and azotemia now likely resolved  - continue with guerrero  - Monitor Urine output  - Flomax daily  - If repeat BMP wnl pt can be d/c home wiht guerrero   - pt to f/u as outpatietn with Dr. Ling in 10 days
This is a 66 y/o M with prostatic enlargement and urinary retention after spinal surgery, now in post-obstructive diuresis with secondary hyponatremia and azotemia.  - Recomend admission to medical team for continued monitoring.  - Continue to trend BMP q 6 hours, closely follow Na and Cr  - Replete K  - Given downtrending urine production, may decrease IVF and encourage patient to drink till thirst.  - Richardson in place for one week and repeat BMP in the office to assess Cr if patient improves and is stable for discharge  - start flomax 0.4mg qhs  - follow up urine culture
67M hx of HTN and Afib s/p cardioversion now s/p posterior cervical fusion and laminectomy on 3/6 presents to [facility] for urinary retention. Being admitted for monitoring BMP and BP in the setting of post-obstructive diuresis

## 2018-03-31 NOTE — DISCHARGE NOTE ADULT - CARE PROVIDERS DIRECT ADDRESSES
[email or web address]

## 2018-03-31 NOTE — PROGRESS NOTE ADULT - PROBLEM SELECTOR PLAN 1
Patient with postobstructive diuresis. Presented in urinary retention, and now s/p guerrero, urine output improving  Continue flomax, appreciate uro recs  supplement K and magnesium, Na WNL  DC with guerrero per urology  Cr improved

## 2018-03-31 NOTE — DISCHARGE NOTE ADULT - PLAN OF CARE
Stable You presented with post-obstructive diuresis. You were seen by Urology and recommended for a Richardson, starting of Flomax. Please continue with Richardson. Please follow up with Urology outpatient within 1-2 weeks for further management. Stable. Continue medications as prescribed. Follow up PCP within 1-2 weeks for further management. Continue blood pressure medication regimen as directed. Monitor for any visual changes, headaches or dizziness.  Monitor blood pressure regularly.  Follow up with your PCP for further management for high blood pressure. You were found to have low potassium and low magnesium. You were repleted. Follow up PCP within 1-2 weeks for further management and repeat BMP with Mg/Ph to monitor electrolytes. You presented with post-obstructive diuresis. You were seen by Urology and recommended for a Richardson, starting of Flomax. Please continue with Richardson. Please follow up with Urology (Dr. Ling) outpatient within 1-2 weeks for further management. You presented with post-obstructive diuresis. You were seen by Urology and recommended for a Richardson, starting of Flomax. Please continue with Richardson. Please follow up with Urology (Dr. Ling) outpatient within 1-2 weeks for further management and trial of void as indicated. Monitor for urinary burning, dysuria, fevers, chills.

## 2018-03-31 NOTE — DISCHARGE NOTE ADULT - CARE PLAN
Principal Discharge DX:	Diuresis excessive  Goal:	Stable  Assessment and plan of treatment:	You presented with post-obstructive diuresis. You were seen by Urology and recommended for a Richardson, starting of Flomax. Please continue with Richardson. Please follow up with Urology outpatient within 1-2 weeks for further management.  Secondary Diagnosis:	Atrial fibrillation  Assessment and plan of treatment:	Stable. Continue medications as prescribed. Follow up PCP within 1-2 weeks for further management.  Secondary Diagnosis:	Essential hypertension  Assessment and plan of treatment:	Continue blood pressure medication regimen as directed. Monitor for any visual changes, headaches or dizziness.  Monitor blood pressure regularly.  Follow up with your PCP for further management for high blood pressure.  Secondary Diagnosis:	Hypokalemia  Assessment and plan of treatment:	You were found to have low potassium and low magnesium. You were repleted. Follow up PCP within 1-2 weeks for further management and repeat BMP with Mg/Ph to monitor electrolytes.  Secondary Diagnosis:	Hyperlipidemia  Assessment and plan of treatment:	Stable. Continue medications as prescribed. Follow up PCP within 1-2 weeks for further management. Principal Discharge DX:	Diuresis excessive  Goal:	Stable  Assessment and plan of treatment:	You presented with post-obstructive diuresis. You were seen by Urology and recommended for a Richardson, starting of Flomax. Please continue with Richardson. Please follow up with Urology (Dr. Ling) outpatient within 1-2 weeks for further management.  Secondary Diagnosis:	Atrial fibrillation  Assessment and plan of treatment:	Stable. Continue medications as prescribed. Follow up PCP within 1-2 weeks for further management.  Secondary Diagnosis:	Essential hypertension  Assessment and plan of treatment:	Continue blood pressure medication regimen as directed. Monitor for any visual changes, headaches or dizziness.  Monitor blood pressure regularly.  Follow up with your PCP for further management for high blood pressure.  Secondary Diagnosis:	Hypokalemia  Assessment and plan of treatment:	You were found to have low potassium and low magnesium. You were repleted. Follow up PCP within 1-2 weeks for further management and repeat BMP with Mg/Ph to monitor electrolytes.  Secondary Diagnosis:	Hyperlipidemia  Assessment and plan of treatment:	Stable. Continue medications as prescribed. Follow up PCP within 1-2 weeks for further management. Principal Discharge DX:	Diuresis excessive  Goal:	Stable  Assessment and plan of treatment:	You presented with post-obstructive diuresis. You were seen by Urology and recommended for a Richardson, starting of Flomax. Please continue with Richardson. Please follow up with Urology (Dr. Ling) outpatient within 1-2 weeks for further management and trial of void as indicated. Monitor for urinary burning, dysuria, fevers, chills.  Secondary Diagnosis:	Atrial fibrillation  Assessment and plan of treatment:	Stable. Continue medications as prescribed. Follow up PCP within 1-2 weeks for further management.  Secondary Diagnosis:	Essential hypertension  Assessment and plan of treatment:	Continue blood pressure medication regimen as directed. Monitor for any visual changes, headaches or dizziness.  Monitor blood pressure regularly.  Follow up with your PCP for further management for high blood pressure.  Secondary Diagnosis:	Hypokalemia  Assessment and plan of treatment:	You were found to have low potassium and low magnesium. You were repleted. Follow up PCP within 1-2 weeks for further management and repeat BMP with Mg/Ph to monitor electrolytes.  Secondary Diagnosis:	Hyperlipidemia  Assessment and plan of treatment:	Stable. Continue medications as prescribed. Follow up PCP within 1-2 weeks for further management.

## 2018-03-31 NOTE — DISCHARGE NOTE ADULT - PATIENT PORTAL LINK FT
You can access the [facility], offered by [facility], by registering with the following website: [email or web address]

## 2018-03-31 NOTE — DISCHARGE NOTE ADULT - INSTRUCTIONS
Pt discharged with guerrero catheter, pt instructed about guerrero care, pt discharged with leg bags, urinary drainage bags , chlorhexidine, and Provon post insertion guerrreo care wipes

## 2018-03-31 NOTE — CHART NOTE - NSCHARTNOTEFT_GEN_A_CORE
Spoke with Urology, will check repeat BMP to monitor electrolytes. If okay, pt may be discharged with Richardson and follow up Urology outpatient in 1-2 weeks for further management.     ADS  [id number] Spoke with Urology, will check repeat BMP to monitor electrolytes. If okay, pt may be discharged with Richardson and follow up Urology outpatient in 1-2 weeks for further management.     Spoke with Orthopedics, okay to restart ASA on discharge.     ADS  [id number]

## 2018-03-31 NOTE — DISCHARGE NOTE ADULT - MEDICATION SUMMARY - MEDICATIONS TO TAKE
I will START or STAY ON the medications listed below when I get home from the hospital:    aspirin 81 mg oral tablet  -- 1 tab(s) by mouth once a day   -- Indication: For Atrial fibrillation    benazepril 40 mg oral tablet  -- 1 tab(s) by mouth once a day in am  -- Indication: For Hypertension    tamsulosin 0.4 mg oral capsule  -- 1 cap(s) by mouth once a day (at bedtime)  -- Indication: For Urinary retention    Multaq 400 mg oral tablet  -- 1 tab(s) by mouth 2 times a day  -- Indication: For Atrial fibrillation    Zetia 10 mg oral tablet  -- 1 tab(s) by mouth 5 times a week  takes Sun/ Mon/ Tuesday/ Thurs/Friday  Skips Wednesday & Saturday  -- Indication: For Hyperlipidemia    metoprolol tartrate 50 mg oral tablet  -- 1 tab(s) by mouth 2 times a day  -- Indication: For Hypertension    amLODIPine 10 mg oral tablet  -- 1 tab(s) by mouth once a day  -- Indication: For Hypertension    Glucosamine & Chondroitin with MSM oral tablet  -- 2 tab(s) by mouth once a day last dose as of 2/28/18  -- Indication: For Supplement    Ocuvite oral tablet  -- 1 tab(s) by mouth once a day last dose on 2/28/18  -- Indication: For Supplement    Vitamin D3 2000 intl units oral tablet  -- 1 tab(s) by mouth once a day in am  -- Indication: For Supplement

## 2018-03-31 NOTE — PROGRESS NOTE ADULT - ATTENDING COMMENTS
Dispo: if repeat BMP with electrolytes improved, can DC home with guerrero and outpt urology followup. pt to f/u as outpatient with Dr. Ling in 10 days. DC time: 35 min

## 2018-03-31 NOTE — DISCHARGE NOTE ADULT - HOSPITAL COURSE
67 M PMHx HTN, A-fib S/P cardioversion, cervical stenosis S/P posterior cervical fusion and laminectomy on 3/6 presents to [facility] for urinary retention. Endorses incomplete emptying, post-void dribbling, frequency and urgency x 2 weeks and post-operative nocturia. No issues with leg weakness or bowel movements.  No fevers or chills. Of note, pt has been taking about 5-8 percocet tablets per day to alleviate pain.     In the ED, S/P 1 L NS, Richardson placement with 7L produced in 24 hours, 1.3 L today.      Diuresis excessive.    -Post-obstructive diuresis, urinary retention   -Richardson   -May be 2/2 drug induced (Percocet??) that exacerbated pre-existing BPH   -Urology Cx - monitor BMP Q6, Richardson x 1 week, Flomax    -F/U outpatient with repeat BMP to assess Cr     Atrial fibrillation.    -S/P cardioversion  -Multaq  -Stable, F/U outpatient PCP     Essential hypertension.    -Amlodipine, Lisinopril and Metoprolol.  -Holding Chlorthalidone given post-obstructive diuresis.   -Stable, F/U outpatient PCP     Cervical stenosis of spine.   -S/P cervical fusion/laminectomy  -Pain control PRN  -F/U outpatient Dr. Gaxiola     Discharge to home 67 M PMHx HTN, A-fib S/P cardioversion, cervical stenosis S/P posterior cervical fusion and laminectomy on 3/6 presents to [facility] for urinary retention. Endorses incomplete emptying, post-void dribbling, frequency and urgency x 2 weeks and post-operative nocturia. No issues with leg weakness or bowel movements.  No fevers or chills. Of note, pt has been taking about 5-8 percocet tablets per day to alleviate pain.   In the ED, S/P 1 L NS, Richardson placement with 7L produced in 24 hours, 1.3 L today.      Diuresis excessive.    -Post-obstructive diuresis, urinary retention   -Richardson   -May be 2/2 drug induced (Percocet??) that exacerbated pre-existing BPH   -Urology Cx - monitor BMP Q6, Richardson x 1 week, Flomax    -F/U outpatient with repeat BMP to assess Cr     Atrial fibrillation.    -S/P cardioversion  -Multaq  -Stable, F/U outpatient PCP     Essential hypertension.    -Amlodipine, Lisinopril and Metoprolol.  -Holding Chlorthalidone given post-obstructive diuresis.   -Stable, F/U outpatient PCP     Cervical stenosis of spine.   -S/P cervical fusion/laminectomy  -Pain control PRN  -F/U outpatient Dr. Gaxiola     Discharge to home 67 M PMHx HTN, A-fib S/P cardioversion, cervical stenosis S/P posterior cervical fusion and laminectomy on 3/6 presents to [facility] for urinary retention. Endorses incomplete emptying, post-void dribbling, frequency and urgency x 2 weeks and post-operative nocturia. No issues with leg weakness or bowel movements.  No fevers or chills. Of note, pt has been taking about 5-8 percocet tablets per day to alleviate pain.   In the ED, S/P 1 L NS, Richardson placement with 7L produced in 24 hours, 1.3 L today.      Diuresis excessive.    -Post-obstructive diuresis, urinary retention   -Richardson   -May be 2/2 drug induced (Percocet??) that exacerbated pre-existing BPH   -Urology Cx - monitor BMP Q6, Richardson x 1 week, Flomax    -F/U outpatient Urology with repeat BMP to assess Cr     Atrial fibrillation.    -S/P cardioversion  -Multaq  -Restart ASA upon discharge   -Stable, F/U outpatient PCP     Essential hypertension.    -Amlodipine, Lisinopril and Metoprolol.  -Holding Chlorthalidone given post-obstructive diuresis.   -Stable, F/U outpatient PCP     Cervical stenosis of spine.   -S/P cervical fusion/laminectomy  -Pain control PRN  -F/U outpatient Dr. Gaxiola     Discharge to home with Richardson 67 M PMHx HTN, A-fib S/P cardioversion, cervical stenosis S/P posterior cervical fusion and laminectomy on 3/6 presents to [facility] for urinary retention. Endorses incomplete emptying, post-void dribbling, frequency and urgency x 2 weeks and post-operative nocturia. No issues with leg weakness or bowel movements.  No fevers or chills. Of note, pt has been taking about 5-8 percocet tablets per day to alleviate pain.   In the ED, S/P 1 L NS, Richardson placement with 7L produced in 24 hours, 1.3 L today.      Diuresis excessive.    -Post-obstructive diuresis, urinary retention   -Richardson   -May be 2/2 drug induced (Percocet??) that exacerbated pre-existing BPH   -Urology Cx - monitor BMP Q6, Richardson x 1 week, Flomax    -F/U outpatient Urology with repeat BMP to assess Cr     Atrial fibrillation.    -S/P cardioversion  -Multaq  -Restart ASA upon discharge   -Stable, F/U outpatient PCP     Essential hypertension.    -Amlodipine, Lisinopril and Metoprolol.  -Holding Chlorthalidone given post-obstructive diuresis.   -Stable, F/U outpatient PCP     Cervical stenosis of spine.   -S/P cervical fusion/laminectomy  -Pain control PRN  -F/U outpatient Dr. Gaxiola     Discharge to home with Richardson

## 2018-04-06 ENCOUNTER — CHART COPY (OUTPATIENT)
Age: 68
End: 2018-04-06

## 2018-04-09 ENCOUNTER — APPOINTMENT (OUTPATIENT)
Dept: UROLOGY | Facility: CLINIC | Age: 68
End: 2018-04-09
Payer: MEDICARE

## 2018-04-09 PROCEDURE — 99214 OFFICE O/P EST MOD 30 MIN: CPT

## 2018-04-11 ENCOUNTER — OUTPATIENT (OUTPATIENT)
Dept: OUTPATIENT SERVICES | Facility: HOSPITAL | Age: 68
LOS: 1 days | End: 2018-04-11
Payer: MEDICARE

## 2018-04-11 ENCOUNTER — APPOINTMENT (OUTPATIENT)
Dept: UROLOGY | Facility: CLINIC | Age: 68
End: 2018-04-11
Payer: MEDICARE

## 2018-04-11 ENCOUNTER — EMERGENCY (EMERGENCY)
Facility: HOSPITAL | Age: 68
LOS: 1 days | Discharge: HOME | End: 2018-04-11
Attending: EMERGENCY MEDICINE | Admitting: EMERGENCY MEDICINE
Payer: MEDICARE

## 2018-04-11 VITALS
SYSTOLIC BLOOD PRESSURE: 139 MMHG | DIASTOLIC BLOOD PRESSURE: 65 MMHG | HEART RATE: 96 BPM | RESPIRATION RATE: 18 BRPM | TEMPERATURE: 98 F | OXYGEN SATURATION: 99 %

## 2018-04-11 VITALS
RESPIRATION RATE: 18 BRPM | SYSTOLIC BLOOD PRESSURE: 147 MMHG | DIASTOLIC BLOOD PRESSURE: 72 MMHG | HEART RATE: 110 BPM | OXYGEN SATURATION: 99 % | TEMPERATURE: 98 F

## 2018-04-11 VITALS
SYSTOLIC BLOOD PRESSURE: 152 MMHG | HEART RATE: 110 BPM | DIASTOLIC BLOOD PRESSURE: 84 MMHG | TEMPERATURE: 98.2 F | RESPIRATION RATE: 17 BRPM

## 2018-04-11 DIAGNOSIS — Z98.890 OTHER SPECIFIED POSTPROCEDURAL STATES: Chronic | ICD-10-CM

## 2018-04-11 DIAGNOSIS — R35.8 XXOTHER POLYURIA: ICD-10-CM

## 2018-04-11 DIAGNOSIS — Z98.1 ARTHRODESIS STATUS: Chronic | ICD-10-CM

## 2018-04-11 PROCEDURE — 99214 OFFICE O/P EST MOD 30 MIN: CPT | Mod: 25

## 2018-04-11 PROCEDURE — 51702 INSERT TEMP BLADDER CATH: CPT

## 2018-04-11 PROCEDURE — 99283 EMERGENCY DEPT VISIT LOW MDM: CPT

## 2018-04-11 NOTE — ED CLERICAL - NS ED CLERK NOTE PRE-ARRIVAL INFORMATION; ADDITIONAL PRE-ARRIVAL INFORMATION
pt recently had cspine surgery developed urinary retention had Richardson placed had post obstructive diuresis had cath removed 2 days ago developed retention again  had cath and developed  post obstuction again needs  electrolytes checked

## 2018-04-11 NOTE — ED PROVIDER NOTE - PROGRESS NOTE DETAILS
ARLEEN Espinoza: Patient re-assessed at bedside, patient states he would like to go home. Labs unchanged, patient is safe for D/C with Urology and PCP F/U.

## 2018-04-11 NOTE — ED PROVIDER NOTE - MEDICAL DECISION MAKING DETAILS
66 Y/O M with a prior hospitalization for azotemia/robinson reffered by his urologist for bloodwork: urologist  noted significant catheter output and referred patient for evaluation of electrolytes. Labwork pending. Patient denies complaints at this time.

## 2018-04-11 NOTE — ED PROVIDER NOTE - OBJECTIVE STATEMENT
66 Y/O F PMH HTN, A-Fib S/P cardioversion on Multaq for prevention, S/P Lamenectomy March 6th was D/C 's March 10th. Pt has a 66 Y/O F PMH HTN, A-Fib S/P cardioversion on Multaq for prevention, S/P Lamenectomy March 6th was D/C 's March 10th. He had a guerrero placed 3/29/18 because of retention. Guerrero was D/C'd 4/9/2018, he has been in retention until today when the Guerrero was replaced today. Urology noted a very significant output and was referred to ED for evaluation of labs. H/O Azotemia and JEAN PIERRE requiring admission. His urologist is Dr. Chelsi Ling. He denies complaints at this time: Denies CP, SOB, ABD PN, Difficulty Breathing or any other symptoms at this time. Pt has a Guerrero catheter with bag at bedside, it is productive of yellow urine.

## 2018-04-11 NOTE — ED PROVIDER NOTE - ATTENDING CONTRIBUTION TO CARE
DR. SALTER, ATTENDING MD-  I performed a face to face bedside interview with patient regarding history of present illness, review of symptoms and past medical history. I completed an independent physical exam.  I have discussed patient's plan of care with PA.   Documentation as above in the note.    68 y/o male sent by urology for labwork.  Per pt, he had recent guerrero catheter removed two days prior.  Has had very lmtd urine output since that time.  The guerrero was reinserted by his urologist this am.  He put out approx 2L of urine and was told to go to ED to have bloodwork done.  Review of prior charts reveals h/o post-obst diuresis with azotemia and robinson.  Denies f/c, ha, neck stiffness, cp, sob, cough, abd pain, n/v/d, dysuria, rash.  Afebrile, vs wnl, nad, ctabil, s1s2 rrr no m/r/g, abd soft non ttp no r/g, no cva tenderness b/l, no leg swelling b/l, no rash, guerrero bag putting out dilute yellow urine.  Obtain cbc, bmp, antic dc with uro f/u as o/p if bun/cr wnl.

## 2018-04-11 NOTE — ED PROVIDER NOTE - CARE PLAN
Assessment and plan of treatment:	Follow up with your primary doctor and urologist. Bring copies of your lab results. Rest, drink plenty of fluids.  Advance activity as tolerated.  Continue all previously prescribed medications as directed.  Follow up with your primary care physician in 48-72 hours- bring copies of your results.  Return to the ER for worsening or persistent symptoms, and/or ANY NEW OR CONCERNING SYMPTOMS. If you have issues obtaining follow up, please call: [phone] to obtain a doctor or specialist who takes your insurance in your area.  You may call [phone] to make an appointment with the internal medicine clinic. Principal Discharge DX:	Urinary retention  Assessment and plan of treatment:	Follow up with your primary doctor and urologist. Bring copies of your lab results. Rest, drink plenty of fluids.  Advance activity as tolerated.  Continue all previously prescribed medications as directed.  Follow up with your primary care physician in 48-72 hours- bring copies of your results.  Return to the ER for worsening or persistent symptoms, and/or ANY NEW OR CONCERNING SYMPTOMS. If you have issues obtaining follow up, please call: [phone] to obtain a doctor or specialist who takes your insurance in your area.  You may call [phone] to make an appointment with the internal medicine clinic.

## 2018-04-11 NOTE — ED ADULT TRIAGE NOTE - CHIEF COMPLAINT QUOTE
Pt was evaluated by urology today and had Richardson catheter re-inserted and had "more than 2 liters" of urinary output.  pt sent for blood work to check electrolytes.

## 2018-04-13 ENCOUNTER — MESSAGE (OUTPATIENT)
Age: 68
End: 2018-04-13

## 2018-04-13 DIAGNOSIS — R35.8 OTHER POLYURIA: ICD-10-CM

## 2018-04-13 DIAGNOSIS — R33.0 DRUG INDUCED RETENTION OF URINE: ICD-10-CM

## 2018-04-13 DIAGNOSIS — N40.1 BENIGN PROSTATIC HYPERPLASIA WITH LOWER URINARY TRACT SYMPTOMS: ICD-10-CM

## 2018-04-13 LAB
APPEARANCE: CLEAR
BACTERIA: NEGATIVE
BILIRUBIN URINE: NEGATIVE
BLOOD URINE: ABNORMAL
COLOR: YELLOW
GLUCOSE QUALITATIVE U: NEGATIVE MG/DL
HYALINE CASTS: 4 /LPF
KETONES URINE: NEGATIVE
LEUKOCYTE ESTERASE URINE: ABNORMAL
MICROSCOPIC-UA: NORMAL
NITRITE URINE: NEGATIVE
PH URINE: 6
PROTEIN URINE: NEGATIVE MG/DL
RED BLOOD CELLS URINE: 3 /HPF
SPECIFIC GRAVITY URINE: 1.01
SQUAMOUS EPITHELIAL CELLS: 0 /HPF
UROBILINOGEN URINE: NEGATIVE MG/DL
WHITE BLOOD CELLS URINE: 29 /HPF

## 2018-04-16 ENCOUNTER — APPOINTMENT (OUTPATIENT)
Dept: ORTHOPEDIC SURGERY | Facility: CLINIC | Age: 68
End: 2018-04-16
Payer: MEDICARE

## 2018-04-16 ENCOUNTER — APPOINTMENT (OUTPATIENT)
Dept: UROLOGY | Facility: CLINIC | Age: 68
End: 2018-04-16

## 2018-04-16 LAB — BACTERIA UR CULT: ABNORMAL

## 2018-04-16 PROCEDURE — 72100 X-RAY EXAM L-S SPINE 2/3 VWS: CPT

## 2018-04-16 PROCEDURE — 99024 POSTOP FOLLOW-UP VISIT: CPT

## 2018-04-19 ENCOUNTER — FORM ENCOUNTER (OUTPATIENT)
Age: 68
End: 2018-04-19

## 2018-04-20 ENCOUNTER — OUTPATIENT (OUTPATIENT)
Dept: OUTPATIENT SERVICES | Facility: HOSPITAL | Age: 68
LOS: 1 days | End: 2018-04-20
Payer: MEDICARE

## 2018-04-20 ENCOUNTER — APPOINTMENT (OUTPATIENT)
Dept: MRI IMAGING | Facility: CLINIC | Age: 68
End: 2018-04-20
Payer: MEDICARE

## 2018-04-20 DIAGNOSIS — Z98.890 OTHER SPECIFIED POSTPROCEDURAL STATES: Chronic | ICD-10-CM

## 2018-04-20 DIAGNOSIS — Z98.1 ARTHRODESIS STATUS: Chronic | ICD-10-CM

## 2018-04-20 DIAGNOSIS — Z00.8 ENCOUNTER FOR OTHER GENERAL EXAMINATION: ICD-10-CM

## 2018-04-20 PROCEDURE — 72148 MRI LUMBAR SPINE W/O DYE: CPT

## 2018-04-20 PROCEDURE — 72148 MRI LUMBAR SPINE W/O DYE: CPT | Mod: 26

## 2018-04-23 ENCOUNTER — APPOINTMENT (OUTPATIENT)
Dept: UROLOGY | Facility: CLINIC | Age: 68
End: 2018-04-23
Payer: MEDICARE

## 2018-04-23 ENCOUNTER — OUTPATIENT (OUTPATIENT)
Dept: OUTPATIENT SERVICES | Facility: HOSPITAL | Age: 68
LOS: 1 days | End: 2018-04-23
Payer: MEDICARE

## 2018-04-23 VITALS — HEART RATE: 70 BPM | DIASTOLIC BLOOD PRESSURE: 73 MMHG | SYSTOLIC BLOOD PRESSURE: 120 MMHG | TEMPERATURE: 97.4 F

## 2018-04-23 DIAGNOSIS — Z98.890 OTHER SPECIFIED POSTPROCEDURAL STATES: Chronic | ICD-10-CM

## 2018-04-23 DIAGNOSIS — Z98.1 ARTHRODESIS STATUS: Chronic | ICD-10-CM

## 2018-04-23 PROCEDURE — 99213 OFFICE O/P EST LOW 20 MIN: CPT | Mod: 25

## 2018-04-23 PROCEDURE — 51702 INSERT TEMP BLADDER CATH: CPT

## 2018-04-24 ENCOUNTER — OUTPATIENT (OUTPATIENT)
Dept: OUTPATIENT SERVICES | Facility: HOSPITAL | Age: 68
LOS: 1 days | End: 2018-04-24
Payer: MEDICARE

## 2018-04-24 VITALS
HEART RATE: 73 BPM | DIASTOLIC BLOOD PRESSURE: 60 MMHG | HEIGHT: 77 IN | RESPIRATION RATE: 16 BRPM | TEMPERATURE: 99 F | SYSTOLIC BLOOD PRESSURE: 100 MMHG | WEIGHT: 242.07 LBS

## 2018-04-24 DIAGNOSIS — Z98.1 ARTHRODESIS STATUS: Chronic | ICD-10-CM

## 2018-04-24 DIAGNOSIS — M48.07 SPINAL STENOSIS, LUMBOSACRAL REGION: ICD-10-CM

## 2018-04-24 DIAGNOSIS — Z98.890 OTHER SPECIFIED POSTPROCEDURAL STATES: Chronic | ICD-10-CM

## 2018-04-24 DIAGNOSIS — M43.16 SPONDYLOLISTHESIS, LUMBAR REGION: ICD-10-CM

## 2018-04-24 LAB
BLD GP AB SCN SERPL QL: NEGATIVE — SIGNIFICANT CHANGE UP
BUN SERPL-MCNC: 18 MG/DL — SIGNIFICANT CHANGE UP (ref 7–23)
CALCIUM SERPL-MCNC: 9.8 MG/DL — SIGNIFICANT CHANGE UP (ref 8.4–10.5)
CHLORIDE SERPL-SCNC: 101 MMOL/L — SIGNIFICANT CHANGE UP (ref 98–107)
CO2 SERPL-SCNC: 23 MMOL/L — SIGNIFICANT CHANGE UP (ref 22–31)
CREAT SERPL-MCNC: 1.06 MG/DL — SIGNIFICANT CHANGE UP (ref 0.5–1.3)
GLUCOSE SERPL-MCNC: 88 MG/DL — SIGNIFICANT CHANGE UP (ref 70–99)
HCT VFR BLD CALC: 39.4 % — SIGNIFICANT CHANGE UP (ref 39–50)
HGB BLD-MCNC: 13.3 G/DL — SIGNIFICANT CHANGE UP (ref 13–17)
MCHC RBC-ENTMCNC: 33.2 PG — SIGNIFICANT CHANGE UP (ref 27–34)
MCHC RBC-ENTMCNC: 33.8 % — SIGNIFICANT CHANGE UP (ref 32–36)
MCV RBC AUTO: 98.3 FL — SIGNIFICANT CHANGE UP (ref 80–100)
NRBC # FLD: 0 — SIGNIFICANT CHANGE UP
PLATELET # BLD AUTO: 248 K/UL — SIGNIFICANT CHANGE UP (ref 150–400)
PMV BLD: 10.5 FL — SIGNIFICANT CHANGE UP (ref 7–13)
POTASSIUM SERPL-MCNC: 4.3 MMOL/L — SIGNIFICANT CHANGE UP (ref 3.5–5.3)
POTASSIUM SERPL-SCNC: 4.3 MMOL/L — SIGNIFICANT CHANGE UP (ref 3.5–5.3)
RBC # BLD: 4.01 M/UL — LOW (ref 4.2–5.8)
RBC # FLD: 14.4 % — SIGNIFICANT CHANGE UP (ref 10.3–14.5)
RH IG SCN BLD-IMP: POSITIVE — SIGNIFICANT CHANGE UP
SODIUM SERPL-SCNC: 140 MMOL/L — SIGNIFICANT CHANGE UP (ref 135–145)
WBC # BLD: 8.79 K/UL — SIGNIFICANT CHANGE UP (ref 3.8–10.5)
WBC # FLD AUTO: 8.79 K/UL — SIGNIFICANT CHANGE UP (ref 3.8–10.5)

## 2018-04-24 PROCEDURE — 93010 ELECTROCARDIOGRAM REPORT: CPT

## 2018-04-24 RX ORDER — SODIUM CHLORIDE 9 MG/ML
1000 INJECTION, SOLUTION INTRAVENOUS
Qty: 0 | Refills: 0 | Status: DISCONTINUED | OUTPATIENT
Start: 2018-05-01 | End: 2018-05-04

## 2018-04-24 RX ORDER — SODIUM CHLORIDE 9 MG/ML
3 INJECTION INTRAMUSCULAR; INTRAVENOUS; SUBCUTANEOUS EVERY 8 HOURS
Qty: 0 | Refills: 0 | Status: DISCONTINUED | OUTPATIENT
Start: 2018-05-01 | End: 2018-05-04

## 2018-04-24 RX ORDER — EZETIMIBE 10 MG/1
1 TABLET ORAL
Qty: 0 | Refills: 0 | COMMUNITY

## 2018-04-24 RX ORDER — MULTIVIT-MIN/FERROUS GLUCONATE 9 MG/15 ML
1 LIQUID (ML) ORAL
Qty: 0 | Refills: 0 | COMMUNITY

## 2018-04-24 RX ORDER — TAMSULOSIN HYDROCHLORIDE 0.4 MG/1
1 CAPSULE ORAL
Qty: 30 | Refills: 0 | OUTPATIENT
Start: 2018-04-24 | End: 2018-05-23

## 2018-04-24 RX ORDER — METOPROLOL TARTRATE 50 MG
1 TABLET ORAL
Qty: 0 | Refills: 0 | COMMUNITY
Start: 2018-04-24

## 2018-04-24 NOTE — H&P PST ADULT - RS GEN PE MLT RESP DETAILS PC
good air movement/breath sounds equal/clear to auscultation bilaterally/no chest wall tenderness/no intercostal retractions/no rhonchi/no subcutaneous emphysema/airway patent/respirations non-labored/no wheezes/no rales

## 2018-04-24 NOTE — H&P PST ADULT - NECK DETAILS
supple/no JVD/normal thyroid gland no JVD/normal thyroid gland/limited ROM on extension and lateral bending/supple

## 2018-04-24 NOTE — H&P PST ADULT - ATTENDING COMMENTS
The patient is having intractable back and leg pain- the nature of the planned surgery, other options available to the patient, the risks and possible complications of this surgery and the other options, including but not limited to death, paralysis, nerve damage, infection, bleeding, failure of the surgery to relieve  symptoms, failure of fusion, dislodgement of interbody graft, hardware failure/breakage/loosening, use of DBM and inherent risks pulmonary and/or cardiac complications, DVT, PE, UTI, spinal fluid leakage, possible need for further surgery in the future, adjacent segment deterioration, etc were discussed with the patient at length and the patient understands and wishes to proceed

## 2018-04-24 NOTE — H&P PST ADULT - HISTORY OF PRESENT ILLNESS
67 y/o  male with PMH: AFIB., HTN, HLD, BPH, SCC presents to PST for pre op evaluation with hx of lower back pain pain x ~ 25 years. S/P epidural, steroid injections 12 years ago with improvement. Pt stated that since summer 2017, c/o progressive lower back pain. S/P epidural and steroid injections x3 with no improvement. Now scheduled for   stenosis, pinch nerve herniated disc of lumbar, sciatica 69 y/o  male with PMH: AFIB., HTN, HLD, BPH, SCC presents to PST for pre op evaluation with hx of lower back pain pain x ~ 25 years. S/P epidural, steroid injections 12 years ago with improvement. Pt with c/o progressive lower back pain since Summer 2017. S/P epidural and steroid injections x3 with no improvement. Now scheduled for   stenosis, pinch nerve herniated disc of lumbar, sciatica 69 y/o  male with PMH: AFIB., HTN, HLD, BPH, SCC presents to PST for pre op evaluation with hx of lower back pain pain x ~ 25 years. S/P epidural, steroid injections 12 years ago with improvement. Pt with c/o progressive lower back pain since Summer 2017. S/P epidural and steroid injections x3 with no improvement. Now scheduled for L2-5 posterior lumbar laminectomy and fusion on 05/01/18

## 2018-04-24 NOTE — H&P PST ADULT - PMH
Atrial fibrillation    Cervical stenosis of spine    Hyperlipidemia    Hypertension Atrial fibrillation    Cervical stenosis of spine    Hyperlipidemia    Hypertension    Lumbar herniated disc    Pinched nerve    Sciatica of left side    Spinal stenosis of lumbar region

## 2018-04-24 NOTE — H&P PST ADULT - VISION (WITH CORRECTIVE LENSES IF THE PATIENT USUALLY WEARS THEM):
Contacts instructed not to wear day of surgery/Normal vision: sees adequately in most situations; can see medication labels, newsprint

## 2018-04-24 NOTE — H&P PST ADULT - NEGATIVE GENERAL GENITOURINARY SYMPTOMS
no gas in urine/no bladder infections/no hematuria/no flank pain L/no flank pain R/no urine discoloration/no renal colic

## 2018-04-25 LAB — SPECIMEN SOURCE: SIGNIFICANT CHANGE UP

## 2018-04-25 RX ORDER — AMOXICILLIN AND CLAVULANATE POTASSIUM 500; 125 MG/1; MG/1
500-125 TABLET, FILM COATED ORAL
Qty: 14 | Refills: 0 | Status: DISCONTINUED | COMMUNITY
Start: 2018-04-13 | End: 2018-04-25

## 2018-04-26 DIAGNOSIS — R33.9 RETENTION OF URINE, UNSPECIFIED: ICD-10-CM

## 2018-04-26 DIAGNOSIS — N40.1 BENIGN PROSTATIC HYPERPLASIA WITH LOWER URINARY TRACT SYMPTOMS: ICD-10-CM

## 2018-04-26 LAB — BACTERIA NPH CULT: SIGNIFICANT CHANGE UP

## 2018-04-30 ENCOUNTER — TRANSCRIPTION ENCOUNTER (OUTPATIENT)
Age: 68
End: 2018-04-30

## 2018-04-30 NOTE — ASU PATIENT PROFILE, ADULT - VISION (WITH CORRECTIVE LENSES IF THE PATIENT USUALLY WEARS THEM):
Normal vision: sees adequately in most situations; can see medication labels, newsprint/Contacts instructed not to wear day of surgery

## 2018-04-30 NOTE — ASU PATIENT PROFILE, ADULT - PMH
Atrial fibrillation    Cervical stenosis of spine    Hyperlipidemia    Hypertension    Lumbar herniated disc    Pinched nerve    Sciatica of left side    Spinal stenosis of lumbar region

## 2018-05-01 ENCOUNTER — APPOINTMENT (OUTPATIENT)
Dept: ORTHOPEDIC SURGERY | Facility: HOSPITAL | Age: 68
End: 2018-05-01
Payer: MEDICARE

## 2018-05-01 ENCOUNTER — INPATIENT (INPATIENT)
Facility: HOSPITAL | Age: 68
LOS: 2 days | Discharge: NURSING, REHAB OR LONG-TERM CARE | End: 2018-05-04
Attending: STUDENT IN AN ORGANIZED HEALTH CARE EDUCATION/TRAINING PROGRAM | Admitting: STUDENT IN AN ORGANIZED HEALTH CARE EDUCATION/TRAINING PROGRAM
Payer: MEDICARE

## 2018-05-01 ENCOUNTER — RESULT REVIEW (OUTPATIENT)
Age: 68
End: 2018-05-01

## 2018-05-01 VITALS
HEART RATE: 68 BPM | WEIGHT: 242.07 LBS | RESPIRATION RATE: 16 BRPM | DIASTOLIC BLOOD PRESSURE: 63 MMHG | HEIGHT: 77 IN | OXYGEN SATURATION: 97 % | SYSTOLIC BLOOD PRESSURE: 110 MMHG | TEMPERATURE: 99 F

## 2018-05-01 DIAGNOSIS — Z98.1 ARTHRODESIS STATUS: Chronic | ICD-10-CM

## 2018-05-01 DIAGNOSIS — Z98.890 OTHER SPECIFIED POSTPROCEDURAL STATES: Chronic | ICD-10-CM

## 2018-05-01 DIAGNOSIS — M48.07 SPINAL STENOSIS, LUMBOSACRAL REGION: ICD-10-CM

## 2018-05-01 LAB
BASOPHILS # BLD AUTO: 0.02 K/UL — SIGNIFICANT CHANGE UP (ref 0–0.2)
BASOPHILS NFR BLD AUTO: 0.2 % — SIGNIFICANT CHANGE UP (ref 0–2)
BUN SERPL-MCNC: 16 MG/DL — SIGNIFICANT CHANGE UP (ref 7–23)
CALCIUM SERPL-MCNC: 8.9 MG/DL — SIGNIFICANT CHANGE UP (ref 8.4–10.5)
CHLORIDE SERPL-SCNC: 98 MMOL/L — SIGNIFICANT CHANGE UP (ref 98–107)
CO2 SERPL-SCNC: 21 MMOL/L — LOW (ref 22–31)
CREAT SERPL-MCNC: 0.85 MG/DL — SIGNIFICANT CHANGE UP (ref 0.5–1.3)
EOSINOPHIL # BLD AUTO: 0 K/UL — SIGNIFICANT CHANGE UP (ref 0–0.5)
EOSINOPHIL NFR BLD AUTO: 0 % — SIGNIFICANT CHANGE UP (ref 0–6)
GLUCOSE SERPL-MCNC: 215 MG/DL — HIGH (ref 70–99)
HCT VFR BLD CALC: 34.5 % — LOW (ref 39–50)
HGB BLD-MCNC: 11.5 G/DL — LOW (ref 13–17)
IMM GRANULOCYTES # BLD AUTO: 0.07 # — SIGNIFICANT CHANGE UP
IMM GRANULOCYTES NFR BLD AUTO: 0.6 % — SIGNIFICANT CHANGE UP (ref 0–1.5)
LYMPHOCYTES # BLD AUTO: 0.41 K/UL — LOW (ref 1–3.3)
LYMPHOCYTES # BLD AUTO: 3.4 % — LOW (ref 13–44)
MCHC RBC-ENTMCNC: 33.3 % — SIGNIFICANT CHANGE UP (ref 32–36)
MCHC RBC-ENTMCNC: 33.3 PG — SIGNIFICANT CHANGE UP (ref 27–34)
MCV RBC AUTO: 100 FL — SIGNIFICANT CHANGE UP (ref 80–100)
MONOCYTES # BLD AUTO: 0.31 K/UL — SIGNIFICANT CHANGE UP (ref 0–0.9)
MONOCYTES NFR BLD AUTO: 2.5 % — SIGNIFICANT CHANGE UP (ref 2–14)
NEUTROPHILS # BLD AUTO: 11.35 K/UL — HIGH (ref 1.8–7.4)
NEUTROPHILS NFR BLD AUTO: 93.3 % — HIGH (ref 43–77)
NRBC # FLD: 0 — SIGNIFICANT CHANGE UP
PLATELET # BLD AUTO: 189 K/UL — SIGNIFICANT CHANGE UP (ref 150–400)
PMV BLD: 11.1 FL — SIGNIFICANT CHANGE UP (ref 7–13)
POTASSIUM SERPL-MCNC: 4.2 MMOL/L — SIGNIFICANT CHANGE UP (ref 3.5–5.3)
POTASSIUM SERPL-SCNC: 4.2 MMOL/L — SIGNIFICANT CHANGE UP (ref 3.5–5.3)
RBC # BLD: 3.45 M/UL — LOW (ref 4.2–5.8)
RBC # FLD: 14.3 % — SIGNIFICANT CHANGE UP (ref 10.3–14.5)
REVIEW TO FOLLOW: YES — SIGNIFICANT CHANGE UP
SODIUM SERPL-SCNC: 136 MMOL/L — SIGNIFICANT CHANGE UP (ref 135–145)
WBC # BLD: 12.16 K/UL — HIGH (ref 3.8–10.5)
WBC # FLD AUTO: 12.16 K/UL — HIGH (ref 3.8–10.5)

## 2018-05-01 PROCEDURE — 22840 INSERT SPINE FIXATION DEVICE: CPT | Mod: 79

## 2018-05-01 PROCEDURE — 63047 LAM FACETEC & FORAMOT LUMBAR: CPT | Mod: 79

## 2018-05-01 PROCEDURE — ZZZZZ: CPT

## 2018-05-01 PROCEDURE — 63048 LAM FACETEC &FORAMOT EA ADDL: CPT

## 2018-05-01 PROCEDURE — 88304 TISSUE EXAM BY PATHOLOGIST: CPT | Mod: 26

## 2018-05-01 PROCEDURE — 22612 ARTHRD PST TQ 1NTRSPC LUMBAR: CPT | Mod: 79

## 2018-05-01 PROCEDURE — 72100 X-RAY EXAM L-S SPINE 2/3 VWS: CPT | Mod: 26

## 2018-05-01 PROCEDURE — 63030 LAMOT DCMPRN NRV RT 1 LMBR: CPT | Mod: 79,59

## 2018-05-01 PROCEDURE — 63267 EXCISE INTRSPINL LESION LMBR: CPT | Mod: 79,59

## 2018-05-01 RX ORDER — SODIUM CHLORIDE 9 MG/ML
1000 INJECTION, SOLUTION INTRAVENOUS
Qty: 0 | Refills: 0 | Status: DISCONTINUED | OUTPATIENT
Start: 2018-05-01 | End: 2018-05-04

## 2018-05-01 RX ORDER — ASPIRIN/CALCIUM CARB/MAGNESIUM 324 MG
81 TABLET ORAL DAILY
Qty: 0 | Refills: 0 | Status: DISCONTINUED | OUTPATIENT
Start: 2018-05-02 | End: 2018-05-04

## 2018-05-01 RX ORDER — ALBUMIN HUMAN 25 %
250 VIAL (ML) INTRAVENOUS
Qty: 0 | Refills: 0 | Status: COMPLETED | OUTPATIENT
Start: 2018-05-01 | End: 2018-05-01

## 2018-05-01 RX ORDER — ALBUMIN HUMAN 25 %
250 VIAL (ML) INTRAVENOUS
Qty: 0 | Refills: 0 | Status: DISCONTINUED | OUTPATIENT
Start: 2018-05-01 | End: 2018-05-01

## 2018-05-01 RX ORDER — AMLODIPINE BESYLATE 2.5 MG/1
10 TABLET ORAL DAILY
Qty: 0 | Refills: 0 | Status: DISCONTINUED | OUTPATIENT
Start: 2018-05-01 | End: 2018-05-04

## 2018-05-01 RX ORDER — SENNA PLUS 8.6 MG/1
2 TABLET ORAL AT BEDTIME
Qty: 0 | Refills: 0 | Status: DISCONTINUED | OUTPATIENT
Start: 2018-05-01 | End: 2018-05-04

## 2018-05-01 RX ORDER — BENZOCAINE AND MENTHOL 5; 1 G/100ML; G/100ML
1 LIQUID ORAL
Qty: 0 | Refills: 0 | Status: DISCONTINUED | OUTPATIENT
Start: 2018-05-01 | End: 2018-05-04

## 2018-05-01 RX ORDER — MAGNESIUM HYDROXIDE 400 MG/1
30 TABLET, CHEWABLE ORAL EVERY 12 HOURS
Qty: 0 | Refills: 0 | Status: DISCONTINUED | OUTPATIENT
Start: 2018-05-01 | End: 2018-05-04

## 2018-05-01 RX ORDER — NALOXONE HYDROCHLORIDE 4 MG/.1ML
0.1 SPRAY NASAL
Qty: 0 | Refills: 0 | Status: DISCONTINUED | OUTPATIENT
Start: 2018-05-01 | End: 2018-05-03

## 2018-05-01 RX ORDER — DOCUSATE SODIUM 100 MG
100 CAPSULE ORAL THREE TIMES A DAY
Qty: 0 | Refills: 0 | Status: DISCONTINUED | OUTPATIENT
Start: 2018-05-01 | End: 2018-05-04

## 2018-05-01 RX ORDER — HYDROMORPHONE HYDROCHLORIDE 2 MG/ML
0.5 INJECTION INTRAMUSCULAR; INTRAVENOUS; SUBCUTANEOUS
Qty: 0 | Refills: 0 | Status: DISCONTINUED | OUTPATIENT
Start: 2018-05-01 | End: 2018-05-03

## 2018-05-01 RX ORDER — LISINOPRIL 2.5 MG/1
40 TABLET ORAL DAILY
Qty: 0 | Refills: 0 | Status: DISCONTINUED | OUTPATIENT
Start: 2018-05-01 | End: 2018-05-04

## 2018-05-01 RX ORDER — METOCLOPRAMIDE HCL 10 MG
10 TABLET ORAL ONCE
Qty: 0 | Refills: 0 | Status: COMPLETED | OUTPATIENT
Start: 2018-05-01 | End: 2018-05-02

## 2018-05-01 RX ORDER — FINASTERIDE 5 MG/1
5 TABLET, FILM COATED ORAL DAILY
Qty: 0 | Refills: 0 | Status: DISCONTINUED | OUTPATIENT
Start: 2018-05-01 | End: 2018-05-04

## 2018-05-01 RX ORDER — TAMSULOSIN HYDROCHLORIDE 0.4 MG/1
0.4 CAPSULE ORAL AT BEDTIME
Qty: 0 | Refills: 0 | Status: DISCONTINUED | OUTPATIENT
Start: 2018-05-01 | End: 2018-05-04

## 2018-05-01 RX ORDER — ACETAMINOPHEN 500 MG
650 TABLET ORAL EVERY 6 HOURS
Qty: 0 | Refills: 0 | Status: DISCONTINUED | OUTPATIENT
Start: 2018-05-01 | End: 2018-05-04

## 2018-05-01 RX ORDER — HYDROMORPHONE HYDROCHLORIDE 2 MG/ML
30 INJECTION INTRAMUSCULAR; INTRAVENOUS; SUBCUTANEOUS
Qty: 0 | Refills: 0 | Status: DISCONTINUED | OUTPATIENT
Start: 2018-05-01 | End: 2018-05-03

## 2018-05-01 RX ORDER — ONDANSETRON 8 MG/1
4 TABLET, FILM COATED ORAL EVERY 6 HOURS
Qty: 0 | Refills: 0 | Status: DISCONTINUED | OUTPATIENT
Start: 2018-05-01 | End: 2018-05-03

## 2018-05-01 RX ORDER — CEFAZOLIN SODIUM 1 G
2000 VIAL (EA) INJECTION EVERY 8 HOURS
Qty: 0 | Refills: 0 | Status: COMPLETED | OUTPATIENT
Start: 2018-05-01 | End: 2018-05-02

## 2018-05-01 RX ORDER — DRONEDARONE 400 MG/1
400 TABLET, FILM COATED ORAL
Qty: 0 | Refills: 0 | Status: DISCONTINUED | OUTPATIENT
Start: 2018-05-01 | End: 2018-05-04

## 2018-05-01 RX ORDER — PANTOPRAZOLE SODIUM 20 MG/1
40 TABLET, DELAYED RELEASE ORAL
Qty: 0 | Refills: 0 | Status: DISCONTINUED | OUTPATIENT
Start: 2018-05-01 | End: 2018-05-04

## 2018-05-01 RX ORDER — DIPHENHYDRAMINE HCL 50 MG
25 CAPSULE ORAL EVERY 4 HOURS
Qty: 0 | Refills: 0 | Status: DISCONTINUED | OUTPATIENT
Start: 2018-05-01 | End: 2018-05-03

## 2018-05-01 RX ORDER — METOPROLOL TARTRATE 50 MG
50 TABLET ORAL
Qty: 0 | Refills: 0 | Status: DISCONTINUED | OUTPATIENT
Start: 2018-05-01 | End: 2018-05-04

## 2018-05-01 RX ADMIN — SODIUM CHLORIDE 3 MILLILITER(S): 9 INJECTION INTRAMUSCULAR; INTRAVENOUS; SUBCUTANEOUS at 21:18

## 2018-05-01 RX ADMIN — Medication 100 MILLIGRAM(S): at 23:28

## 2018-05-01 RX ADMIN — Medication 500 MILLILITER(S): at 21:30

## 2018-05-01 RX ADMIN — Medication 500 MILLILITER(S): at 21:54

## 2018-05-01 RX ADMIN — HYDROMORPHONE HYDROCHLORIDE 30 MILLILITER(S): 2 INJECTION INTRAMUSCULAR; INTRAVENOUS; SUBCUTANEOUS at 23:19

## 2018-05-01 RX ADMIN — SODIUM CHLORIDE 3 MILLILITER(S): 9 INJECTION INTRAMUSCULAR; INTRAVENOUS; SUBCUTANEOUS at 23:22

## 2018-05-01 RX ADMIN — TAMSULOSIN HYDROCHLORIDE 0.4 MILLIGRAM(S): 0.4 CAPSULE ORAL at 23:27

## 2018-05-01 RX ADMIN — Medication 100 MILLIGRAM(S): at 23:26

## 2018-05-01 RX ADMIN — SENNA PLUS 2 TABLET(S): 8.6 TABLET ORAL at 23:27

## 2018-05-01 RX ADMIN — DRONEDARONE 400 MILLIGRAM(S): 400 TABLET, FILM COATED ORAL at 23:49

## 2018-05-01 NOTE — CHART NOTE - NSCHARTNOTEFT_GEN_A_CORE
Patient seen and examined.  Complaining of LBP and bilateral shoulder pain.  Hip pain has resolved.  No numbness, weakness or tingling.    Vital Signs Last 24 Hrs  T(C): 36.4 (01 May 2018 20:30), Max: 37.1 (01 May 2018 10:03)  T(F): 97.6 (01 May 2018 20:30), Max: 98.8 (01 May 2018 10:03)  HR: 86 (01 May 2018 22:00) (68 - 97)  BP: 95/53 (01 May 2018 22:00) (87/54 - 110/63)  BP(mean): --  RR: 12 (01 May 2018 22:00) (12 - 20)  SpO2: 98% (01 May 2018 22:00) (95% - 99%)    Gen: NAD  Neuro  BL IP/Q/HS/TA/GS/EHL/FHL 5/5  SILT L2-S1  WWP distally  Dressing C/D/I    A/P 68 year old male s/p  L2-5 Lami, L3/4 PSIF  Pain Control  DVT PPx: Venodynes  Reg Diet  PT/OT/OOB  IS  Monitor Drain output

## 2018-05-01 NOTE — BRIEF OPERATIVE NOTE - PROCEDURE
<<-----Click on this checkbox to enter Procedure Posterior spinal fusion  05/01/2018  L3/4  Active  ASATIN

## 2018-05-01 NOTE — ASU PREOP CHECKLIST - 4.
Report from Leila OGU/RN at 1054 Report from Leila MARADIAGA/RN at 1050///Pt report from Fabiana ROA at 1111.

## 2018-05-02 ENCOUNTER — TRANSCRIPTION ENCOUNTER (OUTPATIENT)
Age: 68
End: 2018-05-02

## 2018-05-02 DIAGNOSIS — E78.5 HYPERLIPIDEMIA, UNSPECIFIED: ICD-10-CM

## 2018-05-02 DIAGNOSIS — N40.0 BENIGN PROSTATIC HYPERPLASIA WITHOUT LOWER URINARY TRACT SYMPTOMS: ICD-10-CM

## 2018-05-02 DIAGNOSIS — I48.91 UNSPECIFIED ATRIAL FIBRILLATION: ICD-10-CM

## 2018-05-02 DIAGNOSIS — I10 ESSENTIAL (PRIMARY) HYPERTENSION: ICD-10-CM

## 2018-05-02 DIAGNOSIS — D50.0 IRON DEFICIENCY ANEMIA SECONDARY TO BLOOD LOSS (CHRONIC): ICD-10-CM

## 2018-05-02 DIAGNOSIS — D72.829 ELEVATED WHITE BLOOD CELL COUNT, UNSPECIFIED: ICD-10-CM

## 2018-05-02 DIAGNOSIS — M48.061 SPINAL STENOSIS, LUMBAR REGION WITHOUT NEUROGENIC CLAUDICATION: ICD-10-CM

## 2018-05-02 LAB
BASOPHILS # BLD AUTO: 0.01 K/UL — SIGNIFICANT CHANGE UP (ref 0–0.2)
BASOPHILS NFR BLD AUTO: 0.1 % — SIGNIFICANT CHANGE UP (ref 0–2)
BUN SERPL-MCNC: 13 MG/DL — SIGNIFICANT CHANGE UP (ref 7–23)
CALCIUM SERPL-MCNC: 8.8 MG/DL — SIGNIFICANT CHANGE UP (ref 8.4–10.5)
CHLORIDE SERPL-SCNC: 99 MMOL/L — SIGNIFICANT CHANGE UP (ref 98–107)
CO2 SERPL-SCNC: 24 MMOL/L — SIGNIFICANT CHANGE UP (ref 22–31)
CREAT SERPL-MCNC: 0.74 MG/DL — SIGNIFICANT CHANGE UP (ref 0.5–1.3)
EOSINOPHIL # BLD AUTO: 0 K/UL — SIGNIFICANT CHANGE UP (ref 0–0.5)
EOSINOPHIL NFR BLD AUTO: 0 % — SIGNIFICANT CHANGE UP (ref 0–6)
GLUCOSE SERPL-MCNC: 152 MG/DL — HIGH (ref 70–99)
HCT VFR BLD CALC: 31.6 % — LOW (ref 39–50)
HGB BLD-MCNC: 10.7 G/DL — LOW (ref 13–17)
IMM GRANULOCYTES # BLD AUTO: 0.05 # — SIGNIFICANT CHANGE UP
IMM GRANULOCYTES NFR BLD AUTO: 0.4 % — SIGNIFICANT CHANGE UP (ref 0–1.5)
LYMPHOCYTES # BLD AUTO: 0.69 K/UL — LOW (ref 1–3.3)
LYMPHOCYTES # BLD AUTO: 5.8 % — LOW (ref 13–44)
MCHC RBC-ENTMCNC: 33.1 PG — SIGNIFICANT CHANGE UP (ref 27–34)
MCHC RBC-ENTMCNC: 33.9 % — SIGNIFICANT CHANGE UP (ref 32–36)
MCV RBC AUTO: 97.8 FL — SIGNIFICANT CHANGE UP (ref 80–100)
MONOCYTES # BLD AUTO: 0.9 K/UL — SIGNIFICANT CHANGE UP (ref 0–0.9)
MONOCYTES NFR BLD AUTO: 7.5 % — SIGNIFICANT CHANGE UP (ref 2–14)
NEUTROPHILS # BLD AUTO: 10.34 K/UL — HIGH (ref 1.8–7.4)
NEUTROPHILS NFR BLD AUTO: 86.2 % — HIGH (ref 43–77)
NRBC # FLD: 0 — SIGNIFICANT CHANGE UP
PLATELET # BLD AUTO: 184 K/UL — SIGNIFICANT CHANGE UP (ref 150–400)
PMV BLD: 10.8 FL — SIGNIFICANT CHANGE UP (ref 7–13)
POTASSIUM SERPL-MCNC: 4.6 MMOL/L — SIGNIFICANT CHANGE UP (ref 3.5–5.3)
POTASSIUM SERPL-SCNC: 4.6 MMOL/L — SIGNIFICANT CHANGE UP (ref 3.5–5.3)
RBC # BLD: 3.23 M/UL — LOW (ref 4.2–5.8)
RBC # FLD: 14.2 % — SIGNIFICANT CHANGE UP (ref 10.3–14.5)
SODIUM SERPL-SCNC: 135 MMOL/L — SIGNIFICANT CHANGE UP (ref 135–145)
WBC # BLD: 11.99 K/UL — HIGH (ref 3.8–10.5)
WBC # FLD AUTO: 11.99 K/UL — HIGH (ref 3.8–10.5)

## 2018-05-02 PROCEDURE — 99223 1ST HOSP IP/OBS HIGH 75: CPT

## 2018-05-02 RX ADMIN — SODIUM CHLORIDE 75 MILLILITER(S): 9 INJECTION, SOLUTION INTRAVENOUS at 21:38

## 2018-05-02 RX ADMIN — DRONEDARONE 400 MILLIGRAM(S): 400 TABLET, FILM COATED ORAL at 08:42

## 2018-05-02 RX ADMIN — Medication 50 MILLIGRAM(S): at 18:25

## 2018-05-02 RX ADMIN — ONDANSETRON 4 MILLIGRAM(S): 8 TABLET, FILM COATED ORAL at 15:04

## 2018-05-02 RX ADMIN — DRONEDARONE 400 MILLIGRAM(S): 400 TABLET, FILM COATED ORAL at 18:25

## 2018-05-02 RX ADMIN — FINASTERIDE 5 MILLIGRAM(S): 5 TABLET, FILM COATED ORAL at 21:38

## 2018-05-02 RX ADMIN — Medication 100 MILLIGRAM(S): at 21:38

## 2018-05-02 RX ADMIN — SODIUM CHLORIDE 75 MILLILITER(S): 9 INJECTION, SOLUTION INTRAVENOUS at 08:56

## 2018-05-02 RX ADMIN — Medication 100 MILLIGRAM(S): at 06:39

## 2018-05-02 RX ADMIN — SODIUM CHLORIDE 3 MILLILITER(S): 9 INJECTION INTRAMUSCULAR; INTRAVENOUS; SUBCUTANEOUS at 13:36

## 2018-05-02 RX ADMIN — Medication 100 MILLIGRAM(S): at 06:50

## 2018-05-02 RX ADMIN — SODIUM CHLORIDE 3 MILLILITER(S): 9 INJECTION INTRAMUSCULAR; INTRAVENOUS; SUBCUTANEOUS at 06:15

## 2018-05-02 RX ADMIN — SENNA PLUS 2 TABLET(S): 8.6 TABLET ORAL at 21:38

## 2018-05-02 RX ADMIN — SODIUM CHLORIDE 3 MILLILITER(S): 9 INJECTION INTRAMUSCULAR; INTRAVENOUS; SUBCUTANEOUS at 21:33

## 2018-05-02 RX ADMIN — ONDANSETRON 4 MILLIGRAM(S): 8 TABLET, FILM COATED ORAL at 21:38

## 2018-05-02 RX ADMIN — Medication 10 MILLIGRAM(S): at 13:10

## 2018-05-02 RX ADMIN — Medication 81 MILLIGRAM(S): at 11:39

## 2018-05-02 RX ADMIN — HYDROMORPHONE HYDROCHLORIDE 30 MILLILITER(S): 2 INJECTION INTRAMUSCULAR; INTRAVENOUS; SUBCUTANEOUS at 14:51

## 2018-05-02 RX ADMIN — PANTOPRAZOLE SODIUM 40 MILLIGRAM(S): 20 TABLET, DELAYED RELEASE ORAL at 06:39

## 2018-05-02 RX ADMIN — HYDROMORPHONE HYDROCHLORIDE 30 MILLILITER(S): 2 INJECTION INTRAMUSCULAR; INTRAVENOUS; SUBCUTANEOUS at 20:09

## 2018-05-02 RX ADMIN — ONDANSETRON 4 MILLIGRAM(S): 8 TABLET, FILM COATED ORAL at 08:53

## 2018-05-02 RX ADMIN — TAMSULOSIN HYDROCHLORIDE 0.4 MILLIGRAM(S): 0.4 CAPSULE ORAL at 21:38

## 2018-05-02 RX ADMIN — Medication 50 MILLIGRAM(S): at 06:39

## 2018-05-02 NOTE — OCCUPATIONAL THERAPY INITIAL EVALUATION ADULT - GENERAL OBSERVATIONS, REHAB EVAL
Pt. received semisupine in bed. No acute distress. Patient agreed to evaluation from Occupational Therapist. +Clean dry intact dressing to Back, +Accordion Drain, +IV, +Urethral Catheter. PT present bedside to perform PT evaluation in conjunction with OT evaluation.

## 2018-05-02 NOTE — DISCHARGE NOTE ADULT - NS AS DC FOLLOWUP STROKE INST
carenotes on lami, d/c medications/Smoking Cessation carenotes on lami, d/c medications, force patient guids/Smoking Cessation

## 2018-05-02 NOTE — PROGRESS NOTE ADULT - SUBJECTIVE AND OBJECTIVE BOX
Day _2__ of Anesthesia Pain Management Service    Allergies    No Known Allergies    Intolerances        SUBJECTIVE: "I'm doing ok, the pump is helping"     Pain Scale Score	At rest: __2_ 	With Activity: ___ 	[ ] Refer to charted pain scores    THERAPY:    [ ] IV PCA Morphine		[ ] 5 mg/mL	[ ] 1 mg/mL  [X] IV PCA Hydromorphone	[ ] 5 mg/mL	[X] 1 mg/mL  [ ] IV PCA Fentanyl		[ ] 50 micrograms/mL    Demand dose _0.2mg_ lockout _6_ (minutes) Continuous Rate _0_ Total: _4mg__  Daily      MEDICATIONS  (STANDING):  amLODIPine   Tablet 10 milliGRAM(s) Oral daily  aspirin enteric coated 81 milliGRAM(s) Oral daily  docusate sodium 100 milliGRAM(s) Oral three times a day  dronedarone 400 milliGRAM(s) Oral two times a day  finasteride 5 milliGRAM(s) Oral daily  HYDROmorphone PCA (1 mG/mL) 30 milliLiter(s) PCA Continuous PCA Continuous  lactated ringers. 1000 milliLiter(s) (75 mL/Hr) IV Continuous <Continuous>  lactated ringers. 1000 milliLiter(s) (30 mL/Hr) IV Continuous <Continuous>  lisinopril 40 milliGRAM(s) Oral daily  metoprolol tartrate 50 milliGRAM(s) Oral two times a day  pantoprazole    Tablet 40 milliGRAM(s) Oral before breakfast  senna 2 Tablet(s) Oral at bedtime  sodium chloride 0.9% lock flush 3 milliLiter(s) IV Push every 8 hours  tamsulosin 0.4 milliGRAM(s) Oral at bedtime    MEDICATIONS  (PRN):  acetaminophen   Tablet 650 milliGRAM(s) Oral every 6 hours PRN For Temp greater than 38 C (100.4 F)  acetaminophen   Tablet. 650 milliGRAM(s) Oral every 6 hours PRN Headache  benzocaine 15 mG/menthol 3.6 mG Lozenge 1 Lozenge Oral five times a day PRN Sore Throat  diazepam    Tablet 5 milliGRAM(s) Oral every 12 hours PRN Spasm  diphenhydrAMINE   Injectable 25 milliGRAM(s) IV Push every 4 hours PRN Pruritus  HYDROmorphone PCA (1 mG/mL) Rescue Clinician Bolus 0.5 milliGRAM(s) IV Push every 15 minutes PRN for Pain Scale GREATER THAN 6  magnesium hydroxide Suspension 30 milliLiter(s) Oral every 12 hours PRN Constipation  metoclopramide Injectable 10 milliGRAM(s) IV Push once PRN nausea and/or vomiting  naloxone Injectable 0.1 milliGRAM(s) IV Push every 3 minutes PRN For ANY of the following changes in patient status:  A. RR LESS THAN 10 breaths per minute, B. Oxygen saturation LESS THAN 90%, C. Sedation score of 6  ondansetron Injectable 4 milliGRAM(s) IV Push every 6 hours PRN Nausea      OBJECTIVE: A&Ox3, NAD, supine in bed    Sedation Score:	[X] Alert	[ ] Drowsy	[ ] Arousable	[ ] Asleep	[ ] Unresponsive    Side Effects:	[X] None	[ ] Nausea	[ ] Vomiting	[ ] Pruritus  		  [ ] Weakness		[ ] Numbness	[ ] Other:                              10.7   11.99 )-----------( 184      ( 02 May 2018 05:20 )             31.6       05-02    135  |  99  |  13  ----------------------------<  152<H>  4.6   |  24  |  0.74    Ca    8.8      02 May 2018 05:20        ASSESSMENT/ PLAN    Therapy to  be:	[X] Continue   [ ] Discontinued   [ ] Change to prn Analgesics    Documentation and Verification of current medications:  [X] Done	[ ] Not done, not eligible  [ ] Not done, reason not given    [ ]  [place]  Reviewed and Copied to Chart    Comments: continue current therapy

## 2018-05-02 NOTE — DISCHARGE NOTE ADULT - PLAN OF CARE
ambulation without pain weight bear as tolerated   resume same diet as prior to surgery   DR Gaxiola 1 week call for appt [phone]

## 2018-05-02 NOTE — PHYSICAL THERAPY INITIAL EVALUATION ADULT - GENERAL OBSERVATIONS, REHAB EVAL
Patient was received semi-supine in bed, c/o pain but willing to participate in PT evaluation. +dressing to lumbar spine C/D/I, +Hemovac, +PCA pump, +IV, +Richardson.

## 2018-05-02 NOTE — OCCUPATIONAL THERAPY INITIAL EVALUATION ADULT - DIAGNOSIS, OT EVAL
s/p L2-5 posterior lumbar laminectomy and fusion; Decreased functional mobility; Decreased ADL Management

## 2018-05-02 NOTE — OCCUPATIONAL THERAPY INITIAL EVALUATION ADULT - MD ORDER
Occupational Therapy (OT) to evaluate and treat. Occupational Therapy (OT) to evaluate and treat. Out of Bed to Chair. Per STEPHY Chopra, pt is okay to participate in OT evaluation and perform activity as tolerated.

## 2018-05-02 NOTE — CONSULT NOTE ADULT - PROBLEM SELECTOR RECOMMENDATION 9
s/p laminectomy and fusion  Pain controlled on PCA- f/ u anesthesia pain s/p laminectomy and fusion  Pain controlled on PCA- f/ u anesthesia pain  Nausea/vomiting likely related to anesthesia and pain meds  Zofran prn  PT  Bowel regimen  Incentive spirometry

## 2018-05-02 NOTE — PROGRESS NOTE ADULT - SUBJECTIVE AND OBJECTIVE BOX
Patient seen and examined; pain controlled; no events overnight; LE symptoms improved    Vital Signs Last 24 Hrs  T(C): 36.4 (02 May 2018 06:00), Max: 37.1 (01 May 2018 10:03)  T(F): 97.6 (02 May 2018 06:00), Max: 98.8 (01 May 2018 10:03)  HR: 84 (02 May 2018 06:00) (68 - 97)  BP: 107/62 (02 May 2018 05:00) (87/54 - 110/63)  BP(mean): --  RR: 12 (02 May 2018 06:00) (10 - 20)  SpO2: 100% (02 May 2018 06:00) (94% - 100%)    05-02    135  |  99  |  13  ----------------------------<  152<H>  4.6   |  24  |  0.74    Ca    8.8      02 May 2018 05:20                          10.7   11.99 )-----------( 184      ( 02 May 2018 05:20 )             31.6     NAD  Lumbar dressing c/d/i, HV in place  5/5 IP/Q/H/TA/GS/EHL/FHL  JEREMY ATWOOD   [facility]

## 2018-05-02 NOTE — PHYSICAL THERAPY INITIAL EVALUATION ADULT - PERTINENT HX OF CURRENT PROBLEM, REHAB EVAL
Patient is a 68 year old male with PMH of AFIB, HTN, HLD, BPH, & SCC with hx of lower back pain pain x ~ 25 years. Pt is s/p epidural, steroid injections 12 years ago with improvement. Pt with c/o progressive lower back pain since Summer 2017. Pt is s/p epidural and steroid injections x3 with no improvement. Pt is now s/p L2-5 posterior lumbar laminectomy and fusion on 05/01/18.

## 2018-05-02 NOTE — OCCUPATIONAL THERAPY INITIAL EVALUATION ADULT - PERTINENT HX OF CURRENT PROBLEM, REHAB EVAL
Pt is a 68 year old male with hx of AFIB, HTN, HLD, BPH, & SCC with hx of lower back pain pain x ~ 25 years. Pt is s/p epidural, steroid injections 12 years ago with improvement. Pt with c/o progressive lower back pain since Summer 2017. Pt is s/p epidural and steroid injections x3 with no improvement. Pt is now s/p L2-5 posterior lumbar laminectomy and fusion on 05/01/18.

## 2018-05-02 NOTE — DISCHARGE NOTE ADULT - CARE PLAN
Principal Discharge DX:	Lumbar herniated disc  Goal:	ambulation without pain  Assessment and plan of treatment:	weight bear as tolerated   resume same diet as prior to surgery   DR Gaxiola 1 week call for appt [phone]

## 2018-05-02 NOTE — DISCHARGE NOTE ADULT - PATIENT PORTAL LINK FT
You can access the [employer] [facility] Patient Portal, offered by [facility], by registering with the following website: [email or web address]

## 2018-05-02 NOTE — CONSULT NOTE ADULT - SUBJECTIVE AND OBJECTIVE BOX
CHIEF COMPLAINT: Patient is a 68y old  Male who presents with a chief complaint of L2-5 lami/ L3-4 PSIF 5/1/18 (02 May 2018 06:14)      HPI: 68 year old man PMH atrial fibrillation, HTN, HLD, BPH, hx of lower back pain pain x ~ 25 years presents for L2-5 posterior lumbar laminectomy and fusion. Pt has tried epidural steroid injections 12 years ago with improvement. However patient developed progressive lower back pain  for almost one year and is now S/P epidural and steroid injections x3 with no improvement. Pt presented for scheduled for L2-5 posterior lumbar laminectomy and fusion on 05/01/18 (24 Apr 2018 16:08). He tolerated the procedure well.     At time of my evaluation in PACU, his pain was controlled. He had some nausea and vomited after eating. Reports nausea improved thereafter. Concerned about constipation as he became constipated due to pain medications after his last surgery.      Pain Symptoms if applicable:             	                         none	   mild         moderate         severe  	                            0	    1-3	     4-6	         7-10  Pain: 5  Location:	back  Modifying factors:	  Associated symptoms:	    Allergies    No Known Allergies    Intolerances        HOME MEDICATIONS: [x] Reviewed with patient    MEDICATIONS  (STANDING):  amLODIPine   Tablet 10 milliGRAM(s) Oral daily  aspirin enteric coated 81 milliGRAM(s) Oral daily  docusate sodium 100 milliGRAM(s) Oral three times a day  dronedarone 400 milliGRAM(s) Oral two times a day  finasteride 5 milliGRAM(s) Oral daily  HYDROmorphone PCA (1 mG/mL) 30 milliLiter(s) PCA Continuous PCA Continuous  lactated ringers. 1000 milliLiter(s) (75 mL/Hr) IV Continuous <Continuous>  lactated ringers. 1000 milliLiter(s) (30 mL/Hr) IV Continuous <Continuous>  lisinopril 40 milliGRAM(s) Oral daily  metoprolol tartrate 50 milliGRAM(s) Oral two times a day  pantoprazole    Tablet 40 milliGRAM(s) Oral before breakfast  senna 2 Tablet(s) Oral at bedtime  sodium chloride 0.9% lock flush 3 milliLiter(s) IV Push every 8 hours  tamsulosin 0.4 milliGRAM(s) Oral at bedtime    MEDICATIONS  (PRN):  acetaminophen   Tablet 650 milliGRAM(s) Oral every 6 hours PRN For Temp greater than 38 C (100.4 F)  acetaminophen   Tablet. 650 milliGRAM(s) Oral every 6 hours PRN Headache  benzocaine 15 mG/menthol 3.6 mG Lozenge 1 Lozenge Oral five times a day PRN Sore Throat  diazepam    Tablet 5 milliGRAM(s) Oral every 12 hours PRN Spasm  diphenhydrAMINE   Injectable 25 milliGRAM(s) IV Push every 4 hours PRN Pruritus  HYDROmorphone PCA (1 mG/mL) Rescue Clinician Bolus 0.5 milliGRAM(s) IV Push every 15 minutes PRN for Pain Scale GREATER THAN 6  magnesium hydroxide Suspension 30 milliLiter(s) Oral every 12 hours PRN Constipation  naloxone Injectable 0.1 milliGRAM(s) IV Push every 3 minutes PRN For ANY of the following changes in patient status:  A. RR LESS THAN 10 breaths per minute, B. Oxygen saturation LESS THAN 90%, C. Sedation score of 6  ondansetron Injectable 4 milliGRAM(s) IV Push every 6 hours PRN Nausea      PAST MEDICAL & SURGICAL HISTORY:  Sciatica of left side  Pinched nerve  Lumbar herniated disc  Spinal stenosis of lumbar region  Cervical stenosis of spine  Atrial fibrillation  Hyperlipidemia  Hypertension  S/P cervical spinal fusion  H/O laminectomy  S/P Mohs surgery for basal cell carcinoma: nose-2001  History of Mohs surgery for squamous cell carcinoma in situ of skin: scalp x3-2012, 2015  History of cardioversion: 2016  [x ] Reviewed     SOCIAL HISTORY:  [x] Substance abuse: Denies toxic habits  [x] Alcohol use: Drinks Etoh almost daily beer, wine. CAGE Neg  [x] Tobacco: Denies    FAMILY HISTORY:  No pertinent family history in first degree relatives  [x] No pertinent family history in first degree relatives     REVIEW OF SYSTEMS:  [x] All other ROS negative  [  ] Unable to obtain due to poor mental status    Vital Signs Last 24 Hrs  T(C): 36.8 (02 May 2018 14:53), Max: 36.8 (02 May 2018 14:53)  T(F): 98.2 (02 May 2018 14:53), Max: 98.2 (02 May 2018 14:53)  HR: 78 (02 May 2018 14:53) (68 - 97)  BP: 126/66 (02 May 2018 14:53) (87/54 - 128/65)  BP(mean): --  RR: 16 (02 May 2018 14:53) (10 - 20)  SpO2: 98% (02 May 2018 14:53) (94% - 100%)    PHYSICAL EXAM:  GENERAL: NAD, well-groomed, well-developed  HEENT: EOMI, PERRLA, conjunctiva and sclera clear  ENMT: Moist mucous membranes  NECK: Supple, No JVD  RESPIRATORY: Clear to auscultation bilaterally; No rales, rhonchi, wheezing  CARDIOVASCULAR: S1/S2, Regular rate and rhythm  GASTROINTESTINAL: Soft, Nontender, Nondistended; Bowel sounds present  GENITOURINARY: +guerrero  MSK: lumbar dressing c/d/i HV in place  EXTREMITIES:  2+ Peripheral Pulses, No clubbing, cyanosis, or edema  NERVOUS SYSTEM:  Alert & Oriented X3; Moving all 4 extremities    LABS: reviewed                        10.7   11.99 )-----------( 184      ( 02 May 2018 05:20 )             31.6     Hemoglobin: 10.7 g/dL (05-02 @ 05:20)  Hemoglobin: 11.5 g/dL (05-01 @ 21:10)    05-02    135  |  99  |  13  ----------------------------<  152<H>  4.6   |  24  |  0.74    Ca    8.8      02 May 2018 05:20      RADIOLOGY & ADDITIONAL STUDIES:    EKG:   Personally Reviewed:  [x] YES     Imaging:   Personally Reviewed:  [x] YES   Xray Lumbar Spine AP + Lateral (05.01.18 @ 20:12) >  Unilateral L3-L4 level posterior spinal fusion hardware in place consisting of pedicle screws and an interconnecting rafaela. Resected lower lumbar posterior elements and multilevel degenerative disease also apparent. Postsurgical changes in the overlying soft tissues with surgical drain in   place. Correlate with preoperative workup and intraoperative findings.                [ ] Consultant(s) Notes Reviewed  [x] Care Discussed with Consultants/Other Providers: Ortho PA - discussed plan to transition off PCA, pt's chronic indwelling guerrero CHIEF COMPLAINT: Patient is a 68y old  Male who presents with a chief complaint of L2-5 lami/ L3-4 PSIF 5/1/18 (02 May 2018 06:14)      HPI: 68 year old man PMH atrial fibrillation, HTN, HLD, BPH, hx of lower back pain pain x ~ 25 years presents for L2-5 posterior lumbar laminectomy and fusion. Pt has tried epidural steroid injections 12 years ago with improvement. However patient developed progressive lower back pain  for almost one year and is now S/P epidural and steroid injections x3 with no improvement. Pt presented for scheduled for L2-5 posterior lumbar laminectomy and fusion on 05/01/18 (24 Apr 2018 16:08). He tolerated the procedure well.     At time of my evaluation in PACU, his pain was slightly elevated but he felt as if he didn't need to press PCA at that time. He had some nausea and vomited after eating. Reports nausea improved thereafter. Concerned about constipation as he became constipated due to pain medications after his last surgery.      Pain Symptoms if applicable:             	                         none	   mild         moderate         severe  	                            0	    1-3	     4-6	         7-10  Pain: 5  Location:	back  Modifying factors:	  Associated symptoms:	    Allergies    No Known Allergies    Intolerances        HOME MEDICATIONS: [x] Reviewed with patient    MEDICATIONS  (STANDING):  amLODIPine   Tablet 10 milliGRAM(s) Oral daily  aspirin enteric coated 81 milliGRAM(s) Oral daily  docusate sodium 100 milliGRAM(s) Oral three times a day  dronedarone 400 milliGRAM(s) Oral two times a day  finasteride 5 milliGRAM(s) Oral daily  HYDROmorphone PCA (1 mG/mL) 30 milliLiter(s) PCA Continuous PCA Continuous  lactated ringers. 1000 milliLiter(s) (75 mL/Hr) IV Continuous <Continuous>  lactated ringers. 1000 milliLiter(s) (30 mL/Hr) IV Continuous <Continuous>  lisinopril 40 milliGRAM(s) Oral daily  metoprolol tartrate 50 milliGRAM(s) Oral two times a day  pantoprazole    Tablet 40 milliGRAM(s) Oral before breakfast  senna 2 Tablet(s) Oral at bedtime  sodium chloride 0.9% lock flush 3 milliLiter(s) IV Push every 8 hours  tamsulosin 0.4 milliGRAM(s) Oral at bedtime    MEDICATIONS  (PRN):  acetaminophen   Tablet 650 milliGRAM(s) Oral every 6 hours PRN For Temp greater than 38 C (100.4 F)  acetaminophen   Tablet. 650 milliGRAM(s) Oral every 6 hours PRN Headache  benzocaine 15 mG/menthol 3.6 mG Lozenge 1 Lozenge Oral five times a day PRN Sore Throat  diazepam    Tablet 5 milliGRAM(s) Oral every 12 hours PRN Spasm  diphenhydrAMINE   Injectable 25 milliGRAM(s) IV Push every 4 hours PRN Pruritus  HYDROmorphone PCA (1 mG/mL) Rescue Clinician Bolus 0.5 milliGRAM(s) IV Push every 15 minutes PRN for Pain Scale GREATER THAN 6  magnesium hydroxide Suspension 30 milliLiter(s) Oral every 12 hours PRN Constipation  naloxone Injectable 0.1 milliGRAM(s) IV Push every 3 minutes PRN For ANY of the following changes in patient status:  A. RR LESS THAN 10 breaths per minute, B. Oxygen saturation LESS THAN 90%, C. Sedation score of 6  ondansetron Injectable 4 milliGRAM(s) IV Push every 6 hours PRN Nausea      PAST MEDICAL & SURGICAL HISTORY:  Sciatica of left side  Pinched nerve  Lumbar herniated disc  Spinal stenosis of lumbar region  Cervical stenosis of spine  Atrial fibrillation  Hyperlipidemia  Hypertension  S/P cervical spinal fusion  H/O laminectomy  S/P Mohs surgery for basal cell carcinoma: nose-2001  History of Mohs surgery for squamous cell carcinoma in situ of skin: scalp x3-2012, 2015  History of cardioversion: 2016  [x ] Reviewed     SOCIAL HISTORY:  [x] Substance abuse: Denies toxic habits  [x] Alcohol use: Drinks Etoh almost daily beer, wine. CAGE Neg  [x] Tobacco: Denies    FAMILY HISTORY:  No pertinent family history in first degree relatives  [x] No pertinent family history in first degree relatives     REVIEW OF SYSTEMS:  [x] All other ROS negative  [  ] Unable to obtain due to poor mental status    Vital Signs Last 24 Hrs  T(C): 36.8 (02 May 2018 14:53), Max: 36.8 (02 May 2018 14:53)  T(F): 98.2 (02 May 2018 14:53), Max: 98.2 (02 May 2018 14:53)  HR: 78 (02 May 2018 14:53) (68 - 97)  BP: 126/66 (02 May 2018 14:53) (87/54 - 128/65)  BP(mean): --  RR: 16 (02 May 2018 14:53) (10 - 20)  SpO2: 98% (02 May 2018 14:53) (94% - 100%)    PHYSICAL EXAM:  GENERAL: NAD, well-groomed, well-developed  HEENT: EOMI, PERRLA, conjunctiva and sclera clear  ENMT: Moist mucous membranes  NECK: Supple, No JVD  RESPIRATORY: Clear to auscultation bilaterally; No rales, rhonchi, wheezing  CARDIOVASCULAR: S1/S2, Regular rate and rhythm  GASTROINTESTINAL: Soft, Nontender, Nondistended; Bowel sounds present  GENITOURINARY: +guerrero  MSK: lumbar dressing c/d/i HV in place  EXTREMITIES:  2+ Peripheral Pulses, No clubbing, cyanosis, or edema  NERVOUS SYSTEM:  Alert & Oriented X3; Moving all 4 extremities    LABS: reviewed                        10.7   11.99 )-----------( 184      ( 02 May 2018 05:20 )             31.6     Hemoglobin: 10.7 g/dL (05-02 @ 05:20)  Hemoglobin: 11.5 g/dL (05-01 @ 21:10)    05-02    135  |  99  |  13  ----------------------------<  152<H>  4.6   |  24  |  0.74    Ca    8.8      02 May 2018 05:20      RADIOLOGY & ADDITIONAL STUDIES:    EKG:   Personally Reviewed:  [x] YES     Imaging:   Personally Reviewed:  [x] YES   Xray Lumbar Spine AP + Lateral (05.01.18 @ 20:12) >  Unilateral L3-L4 level posterior spinal fusion hardware in place consisting of pedicle screws and an interconnecting rafaela. Resected lower lumbar posterior elements and multilevel degenerative disease also apparent. Postsurgical changes in the overlying soft tissues with surgical drain in   place. Correlate with preoperative workup and intraoperative findings.                [ ] Consultant(s) Notes Reviewed  [x] Care Discussed with Consultants/Other Providers: Ortho PA - discussed plan to transition off PCA, pt's chronic indwelling guerrero

## 2018-05-02 NOTE — PHYSICAL THERAPY INITIAL EVALUATION ADULT - GAIT DEVIATIONS NOTED, PT EVAL
decreased melissa/increased time in double stance/decreased velocity of limb motion/decreased stride length/decreased step length/decreased weight-shifting ability

## 2018-05-02 NOTE — PHYSICAL THERAPY INITIAL EVALUATION ADULT - DISCHARGE DISPOSITION, PT EVAL
rehabilitation facility/Patient will benefit from sub-acute rehab at this time; please follow PT notes

## 2018-05-02 NOTE — PHYSICAL THERAPY INITIAL EVALUATION ADULT - IMPAIRMENTS CONTRIBUTING IMPAIRED BED MOBILITY, REHAB EVAL
impaired postural control/decreased strength/RN Bernarda DIOP aware of pain/decreased ROM/pain/impaired balance

## 2018-05-02 NOTE — PHYSICAL THERAPY INITIAL EVALUATION ADULT - IMPAIRMENTS CONTRIBUTING TO GAIT DEVIATIONS, PT EVAL
impaired balance/impaired postural control/decreased strength/pain/decreased ROM/STEPHY DIOP aware of pain

## 2018-05-02 NOTE — PHYSICAL THERAPY INITIAL EVALUATION ADULT - IMPAIRED TRANSFERS: SIT/STAND, REHAB EVAL
pain/impaired postural control/decreased strength/impaired balance/STEPHY DIOP aware of pain/decreased ROM

## 2018-05-02 NOTE — DISCHARGE NOTE ADULT - INSTRUCTIONS
weight bear as tolerated   resume same diet as prior to surgery   Dr Gaxiola 1 week call for appt [phone] Notify Dr Holbrook if you experience any increase in pain not relieved with pain medication, any redness, drainage or swelling around incision or if you develop a fever >101.0  Maintain proper body alignment, ie; no bending or twisting at the waist, do not sit in a chair for longer than 45 minutes at a time.  Use over the counter stool softeners to assist with constipation which can be a side effect of your pain medication.

## 2018-05-02 NOTE — OCCUPATIONAL THERAPY INITIAL EVALUATION ADULT - LIVES WITH, PROFILE
Pt. reports he lives with his wife in a house with 3 steps to enter. Once inside, pt. reports he has steps to negotiate to reach bedroom and bathroom.

## 2018-05-02 NOTE — PHYSICAL THERAPY INITIAL EVALUATION ADULT - PATIENT PROFILE REVIEW, REHAB EVAL
yes/PT orders received-->out of bed to chair. Consult with STEPHY DIOP-->pt OK to participate in PT evaluation.

## 2018-05-02 NOTE — PHYSICAL THERAPY INITIAL EVALUATION ADULT - ADDITIONAL COMMENTS
Patient reports he lives with his wife in a private house, 3 steps to enter (no handrail). Patient enters house through garage where he has to negotiate another 12 steps total, +handrail. Patient reports he was previously independent in all ADLs. Patient ambulated with a rolling walker for last 1.5 months, prior to that was using a cane but was having multiple falls.    Patient was left semi-supine in bed as found, all lines/tubes intact and call huggins within reach, STEPHY davis

## 2018-05-02 NOTE — DISCHARGE NOTE ADULT - CARE PROVIDER_API CALL
Gaston Gaxiola), Orthopaedic Surgery  [address] [place] [address]  Phone: [phone]  Fax: [phone]

## 2018-05-02 NOTE — DISCHARGE NOTE ADULT - CONDITIONS AT DISCHARGE
stable stable, lumbar dressing in place clean dry and intact, tolerating oob with assist, pt with chronic guerrero to bsd draing qs yellow urine, tolerating diet

## 2018-05-02 NOTE — OCCUPATIONAL THERAPY INITIAL EVALUATION ADULT - PHYSICAL ASSIST/NONPHYSICAL ASSIST: SIT/SUPINE, REHAB EVAL
1 person assist/set-up required/using the log-rolling technique to adhere to spinal precautions/verbal cues

## 2018-05-02 NOTE — CONSULT NOTE ADULT - PROBLEM SELECTOR RECOMMENDATION 2
Continue home meds  Monitor renal function Continue home meds  Monitor renal function and hold multaq if any change in Cr  On ASA for now

## 2018-05-02 NOTE — PROGRESS NOTE ADULT - ASSESSMENT
68M POD1 s/p L2-5 lami, L3/4 PSIF  1. Pain control  2. Venodynes  3. Cont HV  4. WBAT/PT/OT/OOB  5. Cont guerrero  6. Brace when OOB  7. Cont guerrero  8. Dispo planning    MD Suhail

## 2018-05-02 NOTE — OCCUPATIONAL THERAPY INITIAL EVALUATION ADULT - PHYSICAL ASSIST/NONPHYSICAL ASSIST: SUPINE/SIT, REHAB EVAL
verbal cues/1 person assist/using the log-rolling technique to adhere to spinal precautions/set-up required

## 2018-05-02 NOTE — DISCHARGE NOTE ADULT - HOSPITAL COURSE
69 yo is s/p  above without any intraoperative complications.  Pt is weight bear as tolerated  doing well and stable for discharge home. call surgeon's office one week to make appt. D/C sutures/staples POD 14 call surgeon's office one week to make appt. 67 yo is s/p  above without any intraoperative complications.  Pt is weight bear as tolerated  doing well and stable for transfer to rehab. call surgeon's office one week to make appt. D/C sutures/staples POD 14 .  patient has chronic indwelling catheter managed as outpatient by private urologist.

## 2018-05-02 NOTE — DISCHARGE NOTE ADULT - MEDICATION SUMMARY - MEDICATIONS TO TAKE
I will START or STAY ON the medications listed below when I get home from the hospital:    finasteride 5 mg oral tablet  -- 1 tab(s) by mouth once a day  -- Indication: For Home med     aspirin 81 mg oral tablet  -- 1 tab(s) by mouth once a day   -- Indication: For Home med    oxyCODONE 5 mg oral tablet  -- 1 tab(s) by mouth every 3 hours, As needed, Moderate Pain (4 - 6)  -- Indication: For Pain    oxyCODONE 10 mg oral tablet  -- 1 tab(s) by mouth every 3 hours, As needed, Severe Pain (7 - 10)  -- Indication: For Pain    benazepril 40 mg oral tablet  -- 1 tab(s) by mouth once a day in am  -- Indication: For Home med    tamsulosin 0.4 mg oral capsule  -- 1 cap(s) by mouth once a day (at bedtime)  -- Indication: For Home med    Multaq 400 mg oral tablet  -- 1 tab(s) by mouth 2 times a day  -- Indication: For Home med    ezetimibe 10 mg oral tablet  -- 1 tab(s) by mouth 5 times a week  -- Indication: For Home med    metoprolol tartrate 50 mg oral tablet  -- 1 tab(s) by mouth 2 times a day  -- Indication: For Home med    amLODIPine 10 mg oral tablet  -- 1 tab(s) by mouth once a day  -- Indication: For Home med    docusate sodium 100 mg oral capsule  -- 1 cap(s) by mouth 3 times a day  -- Indication: For Stool softener    senna oral tablet  -- 2 tab(s) by mouth once a day (at bedtime)  -- Indication: For Stool softener    Glucosamine & Chondroitin with MSM oral tablet  -- 2 tab(s) by mouth once a day last dose as of 2/28/18  -- Indication: For Supplement    pantoprazole 40 mg oral delayed release tablet  -- 1 tab(s) by mouth once a day (before a meal)  -- Indication: For Stomach protectant    Ocuvite oral tablet  -- 1 tab(s) by mouth once a day   -- Indication: For vit    Vitamin D3 2000 intl units oral tablet  -- 1 tab(s) by mouth once a day in am  -- Indication: For vit

## 2018-05-03 LAB
BASOPHILS # BLD AUTO: 0.01 K/UL — SIGNIFICANT CHANGE UP (ref 0–0.2)
BASOPHILS NFR BLD AUTO: 0.1 % — SIGNIFICANT CHANGE UP (ref 0–2)
BUN SERPL-MCNC: 11 MG/DL — SIGNIFICANT CHANGE UP (ref 7–23)
CALCIUM SERPL-MCNC: 8.7 MG/DL — SIGNIFICANT CHANGE UP (ref 8.4–10.5)
CHLORIDE SERPL-SCNC: 97 MMOL/L — LOW (ref 98–107)
CO2 SERPL-SCNC: 28 MMOL/L — SIGNIFICANT CHANGE UP (ref 22–31)
CREAT SERPL-MCNC: 0.81 MG/DL — SIGNIFICANT CHANGE UP (ref 0.5–1.3)
EOSINOPHIL # BLD AUTO: 0.04 K/UL — SIGNIFICANT CHANGE UP (ref 0–0.5)
EOSINOPHIL NFR BLD AUTO: 0.4 % — SIGNIFICANT CHANGE UP (ref 0–6)
GLUCOSE SERPL-MCNC: 82 MG/DL — SIGNIFICANT CHANGE UP (ref 70–99)
HCT VFR BLD CALC: 30.6 % — LOW (ref 39–50)
HGB BLD-MCNC: 10 G/DL — LOW (ref 13–17)
IMM GRANULOCYTES # BLD AUTO: 0.06 # — SIGNIFICANT CHANGE UP
IMM GRANULOCYTES NFR BLD AUTO: 0.6 % — SIGNIFICANT CHANGE UP (ref 0–1.5)
LYMPHOCYTES # BLD AUTO: 1.62 K/UL — SIGNIFICANT CHANGE UP (ref 1–3.3)
LYMPHOCYTES # BLD AUTO: 15.8 % — SIGNIFICANT CHANGE UP (ref 13–44)
MCHC RBC-ENTMCNC: 32.7 % — SIGNIFICANT CHANGE UP (ref 32–36)
MCHC RBC-ENTMCNC: 33 PG — SIGNIFICANT CHANGE UP (ref 27–34)
MCV RBC AUTO: 101 FL — HIGH (ref 80–100)
MONOCYTES # BLD AUTO: 1.49 K/UL — HIGH (ref 0–0.9)
MONOCYTES NFR BLD AUTO: 14.5 % — HIGH (ref 2–14)
NEUTROPHILS # BLD AUTO: 7.03 K/UL — SIGNIFICANT CHANGE UP (ref 1.8–7.4)
NEUTROPHILS NFR BLD AUTO: 68.6 % — SIGNIFICANT CHANGE UP (ref 43–77)
NRBC # FLD: 0 — SIGNIFICANT CHANGE UP
PLATELET # BLD AUTO: 169 K/UL — SIGNIFICANT CHANGE UP (ref 150–400)
PMV BLD: 11.4 FL — SIGNIFICANT CHANGE UP (ref 7–13)
POTASSIUM SERPL-MCNC: 4.3 MMOL/L — SIGNIFICANT CHANGE UP (ref 3.5–5.3)
POTASSIUM SERPL-SCNC: 4.3 MMOL/L — SIGNIFICANT CHANGE UP (ref 3.5–5.3)
RBC # BLD: 3.03 M/UL — LOW (ref 4.2–5.8)
RBC # FLD: 14.7 % — HIGH (ref 10.3–14.5)
SODIUM SERPL-SCNC: 136 MMOL/L — SIGNIFICANT CHANGE UP (ref 135–145)
SURGICAL PATHOLOGY STUDY: SIGNIFICANT CHANGE UP
WBC # BLD: 10.25 K/UL — SIGNIFICANT CHANGE UP (ref 3.8–10.5)
WBC # FLD AUTO: 10.25 K/UL — SIGNIFICANT CHANGE UP (ref 3.8–10.5)

## 2018-05-03 PROCEDURE — 99233 SBSQ HOSP IP/OBS HIGH 50: CPT

## 2018-05-03 RX ORDER — OXYCODONE HYDROCHLORIDE 5 MG/1
10 TABLET ORAL
Qty: 0 | Refills: 0 | Status: DISCONTINUED | OUTPATIENT
Start: 2018-05-03 | End: 2018-05-04

## 2018-05-03 RX ORDER — OXYCODONE HYDROCHLORIDE 5 MG/1
5 TABLET ORAL
Qty: 0 | Refills: 0 | Status: DISCONTINUED | OUTPATIENT
Start: 2018-05-03 | End: 2018-05-04

## 2018-05-03 RX ADMIN — OXYCODONE HYDROCHLORIDE 10 MILLIGRAM(S): 5 TABLET ORAL at 21:36

## 2018-05-03 RX ADMIN — OXYCODONE HYDROCHLORIDE 10 MILLIGRAM(S): 5 TABLET ORAL at 20:42

## 2018-05-03 RX ADMIN — TAMSULOSIN HYDROCHLORIDE 0.4 MILLIGRAM(S): 0.4 CAPSULE ORAL at 20:42

## 2018-05-03 RX ADMIN — SODIUM CHLORIDE 75 MILLILITER(S): 9 INJECTION, SOLUTION INTRAVENOUS at 05:37

## 2018-05-03 RX ADMIN — LISINOPRIL 40 MILLIGRAM(S): 2.5 TABLET ORAL at 12:00

## 2018-05-03 RX ADMIN — SODIUM CHLORIDE 3 MILLILITER(S): 9 INJECTION INTRAMUSCULAR; INTRAVENOUS; SUBCUTANEOUS at 13:43

## 2018-05-03 RX ADMIN — DRONEDARONE 400 MILLIGRAM(S): 400 TABLET, FILM COATED ORAL at 05:38

## 2018-05-03 RX ADMIN — Medication 100 MILLIGRAM(S): at 05:38

## 2018-05-03 RX ADMIN — Medication 81 MILLIGRAM(S): at 12:00

## 2018-05-03 RX ADMIN — Medication 50 MILLIGRAM(S): at 17:12

## 2018-05-03 RX ADMIN — DRONEDARONE 400 MILLIGRAM(S): 400 TABLET, FILM COATED ORAL at 17:12

## 2018-05-03 RX ADMIN — HYDROMORPHONE HYDROCHLORIDE 30 MILLILITER(S): 2 INJECTION INTRAMUSCULAR; INTRAVENOUS; SUBCUTANEOUS at 08:16

## 2018-05-03 RX ADMIN — FINASTERIDE 5 MILLIGRAM(S): 5 TABLET, FILM COATED ORAL at 20:42

## 2018-05-03 RX ADMIN — OXYCODONE HYDROCHLORIDE 10 MILLIGRAM(S): 5 TABLET ORAL at 13:30

## 2018-05-03 RX ADMIN — Medication 100 MILLIGRAM(S): at 13:44

## 2018-05-03 RX ADMIN — PANTOPRAZOLE SODIUM 40 MILLIGRAM(S): 20 TABLET, DELAYED RELEASE ORAL at 06:29

## 2018-05-03 RX ADMIN — Medication 100 MILLIGRAM(S): at 20:42

## 2018-05-03 RX ADMIN — Medication 50 MILLIGRAM(S): at 05:38

## 2018-05-03 RX ADMIN — SODIUM CHLORIDE 3 MILLILITER(S): 9 INJECTION INTRAMUSCULAR; INTRAVENOUS; SUBCUTANEOUS at 05:35

## 2018-05-03 RX ADMIN — SODIUM CHLORIDE 3 MILLILITER(S): 9 INJECTION INTRAMUSCULAR; INTRAVENOUS; SUBCUTANEOUS at 20:35

## 2018-05-03 RX ADMIN — SENNA PLUS 2 TABLET(S): 8.6 TABLET ORAL at 20:42

## 2018-05-03 RX ADMIN — OXYCODONE HYDROCHLORIDE 10 MILLIGRAM(S): 5 TABLET ORAL at 12:31

## 2018-05-03 RX ADMIN — AMLODIPINE BESYLATE 10 MILLIGRAM(S): 2.5 TABLET ORAL at 12:00

## 2018-05-03 NOTE — PROGRESS NOTE ADULT - SUBJECTIVE AND OBJECTIVE BOX
Patient seen and examined; pain controlled; no events overnight; LE symptoms improved    Vital Signs Last 24 Hrs  T(C): 36.9 (03 May 2018 05:33), Max: 37.1 (03 May 2018 01:58)  T(F): 98.4 (03 May 2018 05:33), Max: 98.7 (03 May 2018 01:58)  HR: 74 (03 May 2018 05:33) (68 - 88)  BP: 116/60 (03 May 2018 05:33) (108/59 - 128/65)  BP(mean): --  RR: 14 (03 May 2018 05:33) (10 - 16)  SpO2: 100% (03 May 2018 05:33) (96% - 100%)                          10.7   11.99 )-----------( 184      ( 02 May 2018 05:20 )             31.6     05-02    135  |  99  |  13  ----------------------------<  152<H>  4.6   |  24  |  0.74    Ca    8.8      02 May 2018 05:20      NAD  Lumbar dressing c/d/i, HV in place  5/5 IP/Q/H/TA/GS/EHL/FHL  JEREMY ATWOOD   [facility]

## 2018-05-03 NOTE — PROGRESS NOTE ADULT - ASSESSMENT
68 year old man PMH atrial fibrillation, HTN, HLD, BPH, hx of lower back pain pain x ~ 25 years presents for L2-5 posterior lumbar laminectomy and fusion 5/1/18. Pt now POD#1. Course complicated by nausea/vomiting now resolved 68 year old man PMH atrial fibrillation, HTN, HLD, BPH, hx of lower back pain pain x ~ 25 years presents for L2-5 posterior lumbar laminectomy and fusion 5/1/18. Pt now POD#2. Course complicated by nausea/vomiting now resolved

## 2018-05-03 NOTE — PROGRESS NOTE ADULT - PROBLEM SELECTOR PLAN 5
Chronic urinary retention   Maintain guerrero Chronic urinary retention   Maintain guerrero  Continue flomax and proscar

## 2018-05-03 NOTE — PROGRESS NOTE ADULT - SUBJECTIVE AND OBJECTIVE BOX
CHIEF COMPLAINT: f/u back pain s/p laminectomy    SUBJECTIVE / OVERNIGHT EVENTS: Patient seen and examined. No acute events overnight. Pt reported pain was 7/10 but controlled with PCA. Reports he had a hard time getting comfortable in the bed. PT came early this am but he couldn't move. Pt denies headaches. No further nausea/vomiting today. Tolerated breakfast. Reports he hasn't passed gas yet. Denies abdominal pain    MEDICATIONS  (STANDING): reviewed.  amLODIPine   Tablet 10 milliGRAM(s) Oral daily  aspirin enteric coated 81 milliGRAM(s) Oral daily  docusate sodium 100 milliGRAM(s) Oral three times a day  dronedarone 400 milliGRAM(s) Oral two times a day  finasteride 5 milliGRAM(s) Oral daily  lactated ringers. 1000 milliLiter(s) (75 mL/Hr) IV Continuous <Continuous>  lactated ringers. 1000 milliLiter(s) (30 mL/Hr) IV Continuous <Continuous>  lisinopril 40 milliGRAM(s) Oral daily  metoprolol tartrate 50 milliGRAM(s) Oral two times a day  pantoprazole    Tablet 40 milliGRAM(s) Oral before breakfast  senna 2 Tablet(s) Oral at bedtime  sodium chloride 0.9% lock flush 3 milliLiter(s) IV Push every 8 hours  tamsulosin 0.4 milliGRAM(s) Oral at bedtime    MEDICATIONS  (PRN):  acetaminophen   Tablet 650 milliGRAM(s) Oral every 6 hours PRN For Temp greater than 38 C (100.4 F)  acetaminophen   Tablet. 650 milliGRAM(s) Oral every 6 hours PRN Headache  benzocaine 15 mG/menthol 3.6 mG Lozenge 1 Lozenge Oral five times a day PRN Sore Throat  diazepam    Tablet 5 milliGRAM(s) Oral every 12 hours PRN Spasm  magnesium hydroxide Suspension 30 milliLiter(s) Oral every 12 hours PRN Constipation  oxyCODONE    IR 5 milliGRAM(s) Oral every 3 hours PRN Moderate Pain (4 - 6)  oxyCODONE    IR 10 milliGRAM(s) Oral every 3 hours PRN Severe Pain (7 - 10)      VITALS:  T(F): 99 (05-03-18 @ 09:14), Max: 99 (05-03-18 @ 09:14)  HR: 78 (05-03-18 @ 09:14) (71 - 88)  BP: 118/66 (05-03-18 @ 09:14) (114/63 - 126/66)  RR: 16 (05-03-18 @ 09:14) (12 - 16)  SpO2: 98% (05-03-18 @ 09:14)  Wt(kg): --      CAPILLARY BLOOD GLUCOSE    PHYSICAL EXAM:  GENERAL: NAD, well-developed  HEAD:  Atraumatic, Normocephalic  EYES: EOMI, PERRLA, conjunctiva and sclera clear  NECK: Supple  CHEST/LUNG: Clear to auscultation bilaterally; No wheezing/rhonchi/rales  HEART: S1, S2, Regular rate and rhythm  ABDOMEN: Soft, Nontender, Nondistended; Bowel sounds present  MSK: thoracolumbar dressing c/d/i, HV in place  EXTREMITIES:  2+ Peripheral Pulses, No clubbing, cyanosis, or edema  PSYCH: AAOx3      LABS: reviewed              10.0                 136  | 28   | 11           10.25 >-----------< 169     ------------------------< 82                    30.6                 4.3  | 97   | 0.81                                         Ca 8.7   Mg x     Ph x          RADIOLOGY & ADDITIONAL TESTS:    [ ] Consultant(s) Notes Reviewed:  [x] Care Discussed with Consultants/Other Providers: Orthopedic PA - discussed HV removal  , anesthesia pain discussed transition off PCA today CHIEF COMPLAINT: f/u back pain s/p laminectomy    SUBJECTIVE / OVERNIGHT EVENTS: Patient seen and examined. No acute events overnight. Pt reported pain was 7/10 but controlled with PCA. Reports he had a hard time getting comfortable in the bed. PT came early this am but he couldn't move. Pt denies headaches. No further nausea/vomiting today. Tolerated breakfast. Reports he hasn't passed gas yet. Denies abdominal pain    MEDICATIONS  (STANDING): reviewed.  amLODIPine   Tablet 10 milliGRAM(s) Oral daily  aspirin enteric coated 81 milliGRAM(s) Oral daily  docusate sodium 100 milliGRAM(s) Oral three times a day  dronedarone 400 milliGRAM(s) Oral two times a day  finasteride 5 milliGRAM(s) Oral daily  lactated ringers. 1000 milliLiter(s) (75 mL/Hr) IV Continuous <Continuous>  lactated ringers. 1000 milliLiter(s) (30 mL/Hr) IV Continuous <Continuous>  lisinopril 40 milliGRAM(s) Oral daily  metoprolol tartrate 50 milliGRAM(s) Oral two times a day  pantoprazole    Tablet 40 milliGRAM(s) Oral before breakfast  senna 2 Tablet(s) Oral at bedtime  sodium chloride 0.9% lock flush 3 milliLiter(s) IV Push every 8 hours  tamsulosin 0.4 milliGRAM(s) Oral at bedtime    MEDICATIONS  (PRN):  acetaminophen   Tablet 650 milliGRAM(s) Oral every 6 hours PRN For Temp greater than 38 C (100.4 F)  acetaminophen   Tablet. 650 milliGRAM(s) Oral every 6 hours PRN Headache  benzocaine 15 mG/menthol 3.6 mG Lozenge 1 Lozenge Oral five times a day PRN Sore Throat  diazepam    Tablet 5 milliGRAM(s) Oral every 12 hours PRN Spasm  magnesium hydroxide Suspension 30 milliLiter(s) Oral every 12 hours PRN Constipation  oxyCODONE    IR 5 milliGRAM(s) Oral every 3 hours PRN Moderate Pain (4 - 6)  oxyCODONE    IR 10 milliGRAM(s) Oral every 3 hours PRN Severe Pain (7 - 10)      VITALS:  T(F): 99 (05-03-18 @ 09:14), Max: 99 (05-03-18 @ 09:14)  HR: 78 (05-03-18 @ 09:14) (71 - 88)  BP: 118/66 (05-03-18 @ 09:14) (114/63 - 126/66)  RR: 16 (05-03-18 @ 09:14) (12 - 16)  SpO2: 98% (05-03-18 @ 09:14)  Wt(kg): --      CAPILLARY BLOOD GLUCOSE    PHYSICAL EXAM:  GENERAL: NAD, well-developed  HEENT: EOMI, conjunctiva and sclera clear, MMM  NECK: Supple  CHEST/LUNG: Clear to auscultation bilaterally; No wheezing/rhonchi/rales  HEART: S1, S2, Regular rate and rhythm  ABDOMEN: Soft, Nontender, Nondistended; Bowel sounds present  MSK: thoracolumbar dressing c/d/i, HV in place  EXTREMITIES:  2+ Peripheral Pulses, No clubbing, cyanosis, or edema  PSYCH: AAOx3      LABS: reviewed              10.0                 136  | 28   | 11           10.25 >-----------< 169     ------------------------< 82                    30.6                 4.3  | 97   | 0.81                                         Ca 8.7   Mg x     Ph x          RADIOLOGY & ADDITIONAL TESTS:    [ ] Consultant(s) Notes Reviewed:  [x] Care Discussed with Consultants/Other Providers: Orthopedic PA - discussed HV removal  , anesthesia pain discussed transition off PCA today

## 2018-05-03 NOTE — PROGRESS NOTE ADULT - SUBJECTIVE AND OBJECTIVE BOX
Anesthesia Pain Management Service- Attending Addendum    SUBJECTIVE: Patient's pain control adequate    Therapy:	  [ X] IV PCA	   [ ] Epidural           [ ] s/p Spinal Opoid              [ ] Postpartum infusion	  [ ] Patient controlled regional anesthesia (PCRA)    [ ] prn Analgesics    Allergies    No Known Allergies    Intolerances      MEDICATIONS  (STANDING):  amLODIPine   Tablet 10 milliGRAM(s) Oral daily  aspirin enteric coated 81 milliGRAM(s) Oral daily  docusate sodium 100 milliGRAM(s) Oral three times a day  dronedarone 400 milliGRAM(s) Oral two times a day  finasteride 5 milliGRAM(s) Oral daily  lactated ringers. 1000 milliLiter(s) (75 mL/Hr) IV Continuous <Continuous>  lactated ringers. 1000 milliLiter(s) (30 mL/Hr) IV Continuous <Continuous>  lisinopril 40 milliGRAM(s) Oral daily  metoprolol tartrate 50 milliGRAM(s) Oral two times a day  pantoprazole    Tablet 40 milliGRAM(s) Oral before breakfast  senna 2 Tablet(s) Oral at bedtime  sodium chloride 0.9% lock flush 3 milliLiter(s) IV Push every 8 hours  tamsulosin 0.4 milliGRAM(s) Oral at bedtime    MEDICATIONS  (PRN):  acetaminophen   Tablet 650 milliGRAM(s) Oral every 6 hours PRN For Temp greater than 38 C (100.4 F)  acetaminophen   Tablet. 650 milliGRAM(s) Oral every 6 hours PRN Headache  benzocaine 15 mG/menthol 3.6 mG Lozenge 1 Lozenge Oral five times a day PRN Sore Throat  diazepam    Tablet 5 milliGRAM(s) Oral every 12 hours PRN Spasm  magnesium hydroxide Suspension 30 milliLiter(s) Oral every 12 hours PRN Constipation  oxyCODONE    IR 5 milliGRAM(s) Oral every 3 hours PRN Moderate Pain (4 - 6)  oxyCODONE    IR 10 milliGRAM(s) Oral every 3 hours PRN Severe Pain (7 - 10)      OBJECTIVE:   [X] No new signs     [ ] Other:    Side Effects:  [X ] None			[ ] Other:      ASSESSMENT/PLAN  -Discontinue current therapy    [ ] Therapy changed to:    [ ] IV PCA       [ ] Epidural     [ X] prn Analgesics     Comments: Pain management per primary team, APS to sign off

## 2018-05-03 NOTE — PROGRESS NOTE ADULT - ASSESSMENT
68M POD2 s/p L2-5 lami, L3/4 PSIF  1. Pain control  2. Venodynes  3. Cont HV  4. WBAT/PT/OT/OOB  5. Cont guerrero  6. Brace when OOB  7. Cont guerrero  8. Dispo planning    MD Suhail

## 2018-05-03 NOTE — PROGRESS NOTE ADULT - SUBJECTIVE AND OBJECTIVE BOX
Anesthesia Pain Management Service    SUBJECTIVE: Patient is doing well with IV PCA and no significant problems reported.    Pain Scale Score	At rest: _2__ 	With Activity: ___ 	[X ] Refer to charted pain scores    THERAPY:    [ ] IV PCA Morphine		[ ] 5 mg/mL	[ ] 1 mg/mL  [X ] IV PCA Hydromorphone	[ ] 5 mg/mL	[X ] 1 mg/mL  [ ] IV PCA Fentanyl		[ ] 50 micrograms/mL    Demand dose __0.2_ lockout __6_ (minutes) Continuous Rate _0__ Total: _6mg__   mg used (in past 24 hrs)      MEDICATIONS  (STANDING):  amLODIPine   Tablet 10 milliGRAM(s) Oral daily  aspirin enteric coated 81 milliGRAM(s) Oral daily  docusate sodium 100 milliGRAM(s) Oral three times a day  dronedarone 400 milliGRAM(s) Oral two times a day  finasteride 5 milliGRAM(s) Oral daily  lactated ringers. 1000 milliLiter(s) (75 mL/Hr) IV Continuous <Continuous>  lactated ringers. 1000 milliLiter(s) (30 mL/Hr) IV Continuous <Continuous>  lisinopril 40 milliGRAM(s) Oral daily  metoprolol tartrate 50 milliGRAM(s) Oral two times a day  pantoprazole    Tablet 40 milliGRAM(s) Oral before breakfast  senna 2 Tablet(s) Oral at bedtime  sodium chloride 0.9% lock flush 3 milliLiter(s) IV Push every 8 hours  tamsulosin 0.4 milliGRAM(s) Oral at bedtime    MEDICATIONS  (PRN):  acetaminophen   Tablet 650 milliGRAM(s) Oral every 6 hours PRN For Temp greater than 38 C (100.4 F)  acetaminophen   Tablet. 650 milliGRAM(s) Oral every 6 hours PRN Headache  benzocaine 15 mG/menthol 3.6 mG Lozenge 1 Lozenge Oral five times a day PRN Sore Throat  diazepam    Tablet 5 milliGRAM(s) Oral every 12 hours PRN Spasm  magnesium hydroxide Suspension 30 milliLiter(s) Oral every 12 hours PRN Constipation      OBJECTIVE: laying down     Sedation Score:	[ X] Alert	[ ] Drowsy 	[ ] Arousable	[ ] Asleep	[ ] Unresponsive    Side Effects:	[X ] None	[ ] Nausea	[ ] Vomiting	[ ] Pruritus  		[ ] Other:    Vital Signs Last 24 Hrs  T(C): 37.2 (03 May 2018 09:14), Max: 37.2 (03 May 2018 09:14)  T(F): 99 (03 May 2018 09:14), Max: 99 (03 May 2018 09:14)  HR: 78 (03 May 2018 09:14) (71 - 88)  BP: 118/66 (03 May 2018 09:14) (110/62 - 126/66)  BP(mean): --  RR: 16 (03 May 2018 09:14) (12 - 16)  SpO2: 98% (03 May 2018 09:14) (98% - 100%)    ASSESSMENT/ PLAN    Therapy to  be:	[ ] Continue   [ X] Discontinued   [X ] Change to prn Analgesics    Documentation and Verification of current medications:   [X] Done	[ ] Not done, not elligible    Comments: PRN Oral/IV opioids and/or Adjuvant medication to be ordered at this point.

## 2018-05-04 VITALS
SYSTOLIC BLOOD PRESSURE: 114 MMHG | DIASTOLIC BLOOD PRESSURE: 55 MMHG | RESPIRATION RATE: 16 BRPM | OXYGEN SATURATION: 95 % | TEMPERATURE: 99 F | HEART RATE: 74 BPM

## 2018-05-04 DIAGNOSIS — K59.00 CONSTIPATION, UNSPECIFIED: ICD-10-CM

## 2018-05-04 LAB
BASOPHILS # BLD AUTO: 0.01 K/UL — SIGNIFICANT CHANGE UP (ref 0–0.2)
BASOPHILS NFR BLD AUTO: 0.1 % — SIGNIFICANT CHANGE UP (ref 0–2)
BUN SERPL-MCNC: 10 MG/DL — SIGNIFICANT CHANGE UP (ref 7–23)
CALCIUM SERPL-MCNC: 8.7 MG/DL — SIGNIFICANT CHANGE UP (ref 8.4–10.5)
CHLORIDE SERPL-SCNC: 98 MMOL/L — SIGNIFICANT CHANGE UP (ref 98–107)
CO2 SERPL-SCNC: 26 MMOL/L — SIGNIFICANT CHANGE UP (ref 22–31)
CREAT SERPL-MCNC: 0.93 MG/DL — SIGNIFICANT CHANGE UP (ref 0.5–1.3)
EOSINOPHIL # BLD AUTO: 0.06 K/UL — SIGNIFICANT CHANGE UP (ref 0–0.5)
EOSINOPHIL NFR BLD AUTO: 0.7 % — SIGNIFICANT CHANGE UP (ref 0–6)
GLUCOSE SERPL-MCNC: 101 MG/DL — HIGH (ref 70–99)
HCT VFR BLD CALC: 29.1 % — LOW (ref 39–50)
HGB BLD-MCNC: 9.9 G/DL — LOW (ref 13–17)
IMM GRANULOCYTES # BLD AUTO: 0.07 # — SIGNIFICANT CHANGE UP
IMM GRANULOCYTES NFR BLD AUTO: 0.8 % — SIGNIFICANT CHANGE UP (ref 0–1.5)
LYMPHOCYTES # BLD AUTO: 1.72 K/UL — SIGNIFICANT CHANGE UP (ref 1–3.3)
LYMPHOCYTES # BLD AUTO: 18.9 % — SIGNIFICANT CHANGE UP (ref 13–44)
MCHC RBC-ENTMCNC: 33.8 PG — SIGNIFICANT CHANGE UP (ref 27–34)
MCHC RBC-ENTMCNC: 34 % — SIGNIFICANT CHANGE UP (ref 32–36)
MCV RBC AUTO: 99.3 FL — SIGNIFICANT CHANGE UP (ref 80–100)
MONOCYTES # BLD AUTO: 1.51 K/UL — HIGH (ref 0–0.9)
MONOCYTES NFR BLD AUTO: 16.6 % — HIGH (ref 2–14)
NEUTROPHILS # BLD AUTO: 5.72 K/UL — SIGNIFICANT CHANGE UP (ref 1.8–7.4)
NEUTROPHILS NFR BLD AUTO: 62.9 % — SIGNIFICANT CHANGE UP (ref 43–77)
NRBC # FLD: 0 — SIGNIFICANT CHANGE UP
PLATELET # BLD AUTO: 170 K/UL — SIGNIFICANT CHANGE UP (ref 150–400)
PMV BLD: 11.4 FL — SIGNIFICANT CHANGE UP (ref 7–13)
POTASSIUM SERPL-MCNC: 3.5 MMOL/L — SIGNIFICANT CHANGE UP (ref 3.5–5.3)
POTASSIUM SERPL-SCNC: 3.5 MMOL/L — SIGNIFICANT CHANGE UP (ref 3.5–5.3)
RBC # BLD: 2.93 M/UL — LOW (ref 4.2–5.8)
RBC # FLD: 14.5 % — SIGNIFICANT CHANGE UP (ref 10.3–14.5)
SODIUM SERPL-SCNC: 136 MMOL/L — SIGNIFICANT CHANGE UP (ref 135–145)
WBC # BLD: 9.09 K/UL — SIGNIFICANT CHANGE UP (ref 3.8–10.5)
WBC # FLD AUTO: 9.09 K/UL — SIGNIFICANT CHANGE UP (ref 3.8–10.5)

## 2018-05-04 PROCEDURE — 99222 1ST HOSP IP/OBS MODERATE 55: CPT | Mod: GC

## 2018-05-04 PROCEDURE — 99233 SBSQ HOSP IP/OBS HIGH 50: CPT

## 2018-05-04 RX ORDER — PANTOPRAZOLE SODIUM 20 MG/1
1 TABLET, DELAYED RELEASE ORAL
Qty: 0 | Refills: 0 | COMMUNITY
Start: 2018-05-04

## 2018-05-04 RX ORDER — POLYETHYLENE GLYCOL 3350 17 G/17G
17 POWDER, FOR SOLUTION ORAL DAILY
Qty: 0 | Refills: 0 | Status: DISCONTINUED | OUTPATIENT
Start: 2018-05-04 | End: 2018-05-04

## 2018-05-04 RX ORDER — DOCUSATE SODIUM 100 MG
1 CAPSULE ORAL
Qty: 0 | Refills: 0 | COMMUNITY
Start: 2018-05-04

## 2018-05-04 RX ORDER — AMLODIPINE BESYLATE 2.5 MG/1
1 TABLET ORAL
Qty: 0 | Refills: 0 | COMMUNITY
Start: 2018-05-04

## 2018-05-04 RX ORDER — OXYCODONE HYDROCHLORIDE 5 MG/1
1 TABLET ORAL
Qty: 0 | Refills: 0 | COMMUNITY
Start: 2018-05-04

## 2018-05-04 RX ORDER — METOPROLOL TARTRATE 50 MG
1 TABLET ORAL
Qty: 0 | Refills: 0 | DISCHARGE
Start: 2018-05-04

## 2018-05-04 RX ORDER — SENNA PLUS 8.6 MG/1
2 TABLET ORAL
Qty: 0 | Refills: 0 | COMMUNITY
Start: 2018-05-04

## 2018-05-04 RX ADMIN — OXYCODONE HYDROCHLORIDE 10 MILLIGRAM(S): 5 TABLET ORAL at 10:00

## 2018-05-04 RX ADMIN — OXYCODONE HYDROCHLORIDE 10 MILLIGRAM(S): 5 TABLET ORAL at 09:19

## 2018-05-04 RX ADMIN — SODIUM CHLORIDE 3 MILLILITER(S): 9 INJECTION INTRAMUSCULAR; INTRAVENOUS; SUBCUTANEOUS at 05:46

## 2018-05-04 RX ADMIN — LISINOPRIL 40 MILLIGRAM(S): 2.5 TABLET ORAL at 05:44

## 2018-05-04 RX ADMIN — OXYCODONE HYDROCHLORIDE 10 MILLIGRAM(S): 5 TABLET ORAL at 12:10

## 2018-05-04 RX ADMIN — Medication 100 MILLIGRAM(S): at 13:25

## 2018-05-04 RX ADMIN — PANTOPRAZOLE SODIUM 40 MILLIGRAM(S): 20 TABLET, DELAYED RELEASE ORAL at 05:44

## 2018-05-04 RX ADMIN — Medication 50 MILLIGRAM(S): at 05:44

## 2018-05-04 RX ADMIN — AMLODIPINE BESYLATE 10 MILLIGRAM(S): 2.5 TABLET ORAL at 05:43

## 2018-05-04 RX ADMIN — OXYCODONE HYDROCHLORIDE 10 MILLIGRAM(S): 5 TABLET ORAL at 12:42

## 2018-05-04 RX ADMIN — Medication 100 MILLIGRAM(S): at 05:44

## 2018-05-04 RX ADMIN — DRONEDARONE 400 MILLIGRAM(S): 400 TABLET, FILM COATED ORAL at 05:44

## 2018-05-04 RX ADMIN — Medication 81 MILLIGRAM(S): at 12:42

## 2018-05-04 NOTE — PROGRESS NOTE ADULT - PROVIDER SPECIALTY LIST ADULT
Orthopedics
Orthopedics
Pain Medicine
Orthopedics
Hospitalist
Hospitalist

## 2018-05-04 NOTE — PROGRESS NOTE ADULT - SUBJECTIVE AND OBJECTIVE BOX
CHIEF COMPLAINT: f/u L2-5 posterior lumbar laminectomy and fusion on 05/01/18     SUBJECTIVE / OVERNIGHT EVENTS: Patient seen and examined. No acute events overnight. Pain well controlled and patient without any complaints. Reports he walked well with PT this am. Reports minimal pain in neck from prior surgery. Eating well. Passing gas but no BM. Drank prune juice this am. Denies chest pain, palpitations, dyspnea.     MEDICATIONS  (STANDING): reviewed  amLODIPine   Tablet 10 milliGRAM(s) Oral daily  aspirin enteric coated 81 milliGRAM(s) Oral daily  docusate sodium 100 milliGRAM(s) Oral three times a day  dronedarone 400 milliGRAM(s) Oral two times a day  finasteride 5 milliGRAM(s) Oral daily  lactated ringers. 1000 milliLiter(s) (75 mL/Hr) IV Continuous <Continuous>  lisinopril 40 milliGRAM(s) Oral daily  metoprolol tartrate 50 milliGRAM(s) Oral two times a day  pantoprazole    Tablet 40 milliGRAM(s) Oral before breakfast  senna 2 Tablet(s) Oral at bedtime  sodium chloride 0.9% lock flush 3 milliLiter(s) IV Push every 8 hours  tamsulosin 0.4 milliGRAM(s) Oral at bedtime    MEDICATIONS  (PRN):  acetaminophen   Tablet 650 milliGRAM(s) Oral every 6 hours PRN For Temp greater than 38 C (100.4 F)  acetaminophen   Tablet. 650 milliGRAM(s) Oral every 6 hours PRN Headache  benzocaine 15 mG/menthol 3.6 mG Lozenge 1 Lozenge Oral five times a day PRN Sore Throat  diazepam    Tablet 5 milliGRAM(s) Oral every 12 hours PRN Spasm  magnesium hydroxide Suspension 30 milliLiter(s) Oral every 12 hours PRN Constipation  oxyCODONE    IR 5 milliGRAM(s) Oral every 3 hours PRN Moderate Pain (4 - 6)  oxyCODONE    IR 10 milliGRAM(s) Oral every 3 hours PRN Severe Pain (7 - 10)      VITALS:  T(F): 99 (05-04-18 @ 09:21), Max: 100.3 (05-03-18 @ 20:39)  HR: 78 (05-04-18 @ 09:21) (71 - 86)  BP: 118/51 (05-04-18 @ 09:21) (108/52 - 122/69)  RR: 17 (05-04-18 @ 09:21) (16 - 17)  SpO2: 96% (05-04-18 @ 09:21)  Wt(kg): --      CAPILLARY BLOOD GLUCOSE    PHYSICAL EXAM:  GENERAL: NAD, well-developed  HEENT: EOMI, conjunctiva and sclera clear, MMM  NECK: Supple, No JVD  CHEST/LUNG: Clear to auscultation bilaterally; No wheezing/ rhonchi/rales   HEART: S1/S2, Regular rate and rhythm; No murmurs, rubs, or gallops  ABDOMEN: Soft, Nontender, Nondistended; Bowel sounds present  MSK: lumbar dressing c/d/i  EXTREMITIES:  2+ Peripheral Pulses, No clubbing, cyanosis, or edema  PSYCH: AAOx3  SKIN: No rashes or lesions    LABS: reviewed              9.9                  136  | 26   | 10           9.09  >-----------< 170     ------------------------< 101                   29.1                 3.5  | 98   | 0.93                                         Ca 8.7   Mg x     Ph x          RADIOLOGY & ADDITIONAL TESTS:  Imaging Personally Reviewed:     [ ] Consultant(s) Notes Reviewed:  [x] Care Discussed with Consultants/Other Providers: Orthopedic PA - discussed discharge planning ? to acute rehab pending PMR eval

## 2018-05-04 NOTE — PROGRESS NOTE ADULT - PROBLEM SELECTOR PLAN 1
s/p laminectomy and fusion  Pain controlled on PCA plan to transition to oral pain meds today  Zofran prn  PT  Bowel regimen  Incentive spirometry.
s/p laminectomy and fusion 5/1/18  POD#3  Pain controlled on current regimen. Continue pain meds  Zofran prn  PT  Bowel regimen  Incentive spirometry.

## 2018-05-04 NOTE — PROGRESS NOTE ADULT - SUBJECTIVE AND OBJECTIVE BOX
Patient seen and examined; pain controlled; no events overnight; LE symptoms improved    ICU Vital Signs Last 24 Hrs  T(C): 37.4 (04 May 2018 05:41), Max: 37.9 (03 May 2018 17:30)  T(F): 99.3 (04 May 2018 05:41), Max: 100.3 (03 May 2018 20:39)  HR: 86 (04 May 2018 05:41) (71 - 86)  BP: 115/49 (04 May 2018 05:41) (108/52 - 122/69)  BP(mean): --  ABP: --  ABP(mean): --  RR: 16 (04 May 2018 05:41) (16 - 17)  SpO2: 96% (04 May 2018 05:41) (95% - 98%)                          9.9    9.09  )-----------( 170      ( 04 May 2018 05:26 )             29.1     05-04    136  |  98  |  10  ----------------------------<  101<H>  3.5   |  26  |  0.93    Ca    8.7      04 May 2018 05:26    NAD  Lumbar dressing changed, incision c/d/i  HV removed, tip intact  5/5 IP/Q/H/TA/GS/EHL/FHL  JEREMY HERNANDEZ

## 2018-05-04 NOTE — PROGRESS NOTE ADULT - ASSESSMENT
68M POD3 s/p L2-5 lami, L3/4 PSIF  1. Pain control  2. Venodynes  3. WBAT/PT/OT/OOB  4. Cont guerrero  5. Brace when OOB  6. Dispo planning  7. FU PM&R    MD Suhail

## 2018-05-04 NOTE — PROGRESS NOTE ADULT - PROBLEM SELECTOR PROBLEM 5
Benign prostatic hyperplasia, unspecified whether lower urinary tract symptoms present
Benign prostatic hyperplasia, unspecified whether lower urinary tract symptoms present

## 2018-05-04 NOTE — PROGRESS NOTE ADULT - ASSESSMENT
68 year old man PMH atrial fibrillation, HTN, HLD, BPH, hx of lower back pain pain x ~ 25 years presents for L2-5 posterior lumbar laminectomy and fusion 5/1/18. Pt now POD#2. Course complicated by nausea/vomiting now resolved

## 2018-05-04 NOTE — CONSULT NOTE ADULT - SUBJECTIVE AND OBJECTIVE BOX
HPI:  67 y/o  male with PMH: AFIB., HTN, HLD, BPH, SCC presents to PST for pre op evaluation with hx of lower back pain pain x ~ 25 years. S/P epidural, steroid injections 12 years ago with improvement. Pt with c/o progressive lower back pain since Summer 2017. now s/p POD3 s/p L2-5 lami, L3/4 PSIF.     REVIEW OF SYSTEMS: No chest pain, shortness of breath, nausea, vomiting or diarhea.      PAST MEDICAL & SURGICAL HISTORY  Sciatica of left side  Pinched nerve  Lumbar herniated disc  Spinal stenosis of lumbar region  Cervical stenosis of spine  Atrial fibrillation  Hyperlipidemia  Hypertension  S/P laminectomy  S/P cervical spinal fusion  H/O laminectomy  S/P Mohs surgery for basal cell carcinoma  History of Mohs surgery for squamous cell carcinoma in situ of skin  History of cardioversion      SOCIAL HISTORY  Smoking - Denied, EtOH - Denied, Drugs - Denied    FUNCTIONAL HISTORY:   Lives with spouse  Independent PTA      CURRENT FUNCTIONAL STATUS: CGA       FAMILY HISTORY   No pertinent family history in first degree relatives      RECENT LABS/IMAGING  CBC Full  -  ( 04 May 2018 05:26 )  WBC Count : 9.09 K/uL  Hemoglobin : 9.9 g/dL  Hematocrit : 29.1 %  Platelet Count - Automated : 170 K/uL  Mean Cell Volume : 99.3 fL  Mean Cell Hemoglobin : 33.8 pg  Mean Cell Hemoglobin Concentration : 34.0 %  Auto Neutrophil # : 5.72 K/uL  Auto Lymphocyte # : 1.72 K/uL  Auto Monocyte # : 1.51 K/uL  Auto Eosinophil # : 0.06 K/uL  Auto Basophil # : 0.01 K/uL  Auto Neutrophil % : 62.9 %  Auto Lymphocyte % : 18.9 %  Auto Monocyte % : 16.6 %  Auto Eosinophil % : 0.7 %  Auto Basophil % : 0.1 %    05-04    136  |  98  |  10  ----------------------------<  101<H>  3.5   |  26  |  0.93    Ca    8.7      04 May 2018 05:26          VITALS  T(C): 37.2 (05-04-18 @ 09:21), Max: 37.9 (05-03-18 @ 17:30)  HR: 78 (05-04-18 @ 09:21) (71 - 86)  BP: 118/51 (05-04-18 @ 09:21) (108/52 - 122/69)  RR: 17 (05-04-18 @ 09:21) (16 - 17)  SpO2: 96% (05-04-18 @ 09:21) (95% - 96%)  Wt(kg): --    ALLERGIES  No Known Allergies      MEDICATIONS   acetaminophen   Tablet 650 milliGRAM(s) Oral every 6 hours PRN  acetaminophen   Tablet. 650 milliGRAM(s) Oral every 6 hours PRN  amLODIPine   Tablet 10 milliGRAM(s) Oral daily  aspirin enteric coated 81 milliGRAM(s) Oral daily  benzocaine 15 mG/menthol 3.6 mG Lozenge 1 Lozenge Oral five times a day PRN  diazepam    Tablet 5 milliGRAM(s) Oral every 12 hours PRN  docusate sodium 100 milliGRAM(s) Oral three times a day  dronedarone 400 milliGRAM(s) Oral two times a day  finasteride 5 milliGRAM(s) Oral daily  lactated ringers. 1000 milliLiter(s) IV Continuous <Continuous>  lisinopril 40 milliGRAM(s) Oral daily  magnesium hydroxide Suspension 30 milliLiter(s) Oral every 12 hours PRN  metoprolol tartrate 50 milliGRAM(s) Oral two times a day  oxyCODONE    IR 5 milliGRAM(s) Oral every 3 hours PRN  oxyCODONE    IR 10 milliGRAM(s) Oral every 3 hours PRN  pantoprazole    Tablet 40 milliGRAM(s) Oral before breakfast  senna 2 Tablet(s) Oral at bedtime  sodium chloride 0.9% lock flush 3 milliLiter(s) IV Push every 8 hours  tamsulosin 0.4 milliGRAM(s) Oral at bedtime      ----------------------------------------------------------------------------------------  PHYSICAL EXAM  Constitutional - NAD, Comfortable  HEENT - NCAT, EOMI  Neck - Supple, No limited ROM  Chest - CTA bilaterally, No wheeze, No rhonchi, No crackles  Cardiovascular - RRR, S1S2, No murmurs  Abdomen - BS+, Soft, NTND  Extremities - No C/C/E, No calf tenderness   Neurologic Exam -                    Cognitive - Awake, Alert, AAO to self, place, date, year, situation     Communication - Fluent, No dysarthria, no aphasia     Cranial Nerves - CN 2-12 intact     Motor - No focal deficits                       Sensory - Intact to LT     Reflexes - DTR Intact, No primitive reflexive     Balance - WNL Static  Psychiatric - Mood stable, Affect WNL HPI:  69 y/o  male with PMH: AFIB., HTN, HLD, BPH, SCC presents to PST for pre op evaluation with hx of lower back pain pain x ~ 25 years. S/P epidural, steroid injections 12 years ago with improvement. Pt with c/o progressive lower back pain since Summer 2017. now s/p POD3 s/p L2-5 lami, L3/4 PSIF. Pain controlled, + constipation. Tolerating PT    REVIEW OF SYSTEMS: No chest pain, shortness of breath, nausea, vomiting or diarhea.      PAST MEDICAL & SURGICAL HISTORY  Sciatica of left side  Pinched nerve  Lumbar herniated disc  Spinal stenosis of lumbar region  Cervical stenosis of spine  Atrial fibrillation  Hyperlipidemia  Hypertension  S/P laminectomy  S/P cervical spinal fusion  H/O laminectomy  S/P Mohs surgery for basal cell carcinoma  History of Mohs surgery for squamous cell carcinoma in situ of skin  History of cardioversion      SOCIAL HISTORY  Smoking - Denied, EtOH - Denied, Drugs - Denied    FUNCTIONAL HISTORY:   Lives with spouse  Independent PTA      CURRENT FUNCTIONAL STATUS: CGA       FAMILY HISTORY   No pertinent family history in first degree relatives      RECENT LABS/IMAGING  CBC Full  -  ( 04 May 2018 05:26 )  WBC Count : 9.09 K/uL  Hemoglobin : 9.9 g/dL  Hematocrit : 29.1 %  Platelet Count - Automated : 170 K/uL  Mean Cell Volume : 99.3 fL  Mean Cell Hemoglobin : 33.8 pg  Mean Cell Hemoglobin Concentration : 34.0 %  Auto Neutrophil # : 5.72 K/uL  Auto Lymphocyte # : 1.72 K/uL  Auto Monocyte # : 1.51 K/uL  Auto Eosinophil # : 0.06 K/uL  Auto Basophil # : 0.01 K/uL  Auto Neutrophil % : 62.9 %  Auto Lymphocyte % : 18.9 %  Auto Monocyte % : 16.6 %  Auto Eosinophil % : 0.7 %  Auto Basophil % : 0.1 %    05-04    136  |  98  |  10  ----------------------------<  101<H>  3.5   |  26  |  0.93    Ca    8.7      04 May 2018 05:26          VITALS  T(C): 37.2 (05-04-18 @ 09:21), Max: 37.9 (05-03-18 @ 17:30)  HR: 78 (05-04-18 @ 09:21) (71 - 86)  BP: 118/51 (05-04-18 @ 09:21) (108/52 - 122/69)  RR: 17 (05-04-18 @ 09:21) (16 - 17)  SpO2: 96% (05-04-18 @ 09:21) (95% - 96%)  Wt(kg): --    ALLERGIES  No Known Allergies      MEDICATIONS   acetaminophen   Tablet 650 milliGRAM(s) Oral every 6 hours PRN  acetaminophen   Tablet. 650 milliGRAM(s) Oral every 6 hours PRN  amLODIPine   Tablet 10 milliGRAM(s) Oral daily  aspirin enteric coated 81 milliGRAM(s) Oral daily  benzocaine 15 mG/menthol 3.6 mG Lozenge 1 Lozenge Oral five times a day PRN  diazepam    Tablet 5 milliGRAM(s) Oral every 12 hours PRN  docusate sodium 100 milliGRAM(s) Oral three times a day  dronedarone 400 milliGRAM(s) Oral two times a day  finasteride 5 milliGRAM(s) Oral daily  lactated ringers. 1000 milliLiter(s) IV Continuous <Continuous>  lisinopril 40 milliGRAM(s) Oral daily  magnesium hydroxide Suspension 30 milliLiter(s) Oral every 12 hours PRN  metoprolol tartrate 50 milliGRAM(s) Oral two times a day  oxyCODONE    IR 5 milliGRAM(s) Oral every 3 hours PRN  oxyCODONE    IR 10 milliGRAM(s) Oral every 3 hours PRN  pantoprazole    Tablet 40 milliGRAM(s) Oral before breakfast  senna 2 Tablet(s) Oral at bedtime  sodium chloride 0.9% lock flush 3 milliLiter(s) IV Push every 8 hours  tamsulosin 0.4 milliGRAM(s) Oral at bedtime      ----------------------------------------------------------------------------------------  PHYSICAL EXAM  Constitutional - NAD, Comfortable  HEENT - NCAT, EOMI  Neck - Supple, No limited ROM  Chest - CTA bilaterally, No wheeze, No rhonchi, No crackles  Cardiovascular - RRR, S1S2, No murmurs  Abdomen - BS+, Soft, NTND  Extremities - No C/C/E, No calf tenderness   Neurologic Exam -                    Cognitive - Awake, Alert, AAO to self, place, date, year, situation     Communication - Fluent, No dysarthria, no aphasia     Cranial Nerves - CN 2-12 intact     Motor - 4/5 HF/KE due to pain, 5/5 DF/Pf                        Sensory - Intact to LT     Reflexes - DTR Intact, No primitive reflexive     Balance - WNL Static  Psychiatric - Mood stable, Affect WNL

## 2018-05-04 NOTE — CONSULT NOTE ADULT - ASSESSMENT
68 year old man PMH atrial fibrillation, HTN, HLD, BPH, hx of lower back pain pain x ~ 25 years presents for L2-5 posterior lumbar laminectomy and fusion 5/1/18. Pt now POD#1. Course complicated by nausea/vomiting
gait abnormality   PT while inpt  pain control  bowel regimen- add dulcolax   Dispo- functioning at contact guard assist, would recommend BHARTI.

## 2018-05-04 NOTE — PROGRESS NOTE ADULT - PROBLEM SELECTOR PROBLEM 1
Spinal stenosis of lumbar region, unspecified whether neurogenic claudication present
Spinal stenosis of lumbar region, unspecified whether neurogenic claudication present

## 2018-05-04 NOTE — PROGRESS NOTE ADULT - PROBLEM SELECTOR PLAN 3
As expected due to recent procedure  Hgb stable  HV removed  Transfuse for Hgb<7
As expected due to recent procedure  Hgb stable  Monitor HV output  Transfuse for Hgb<7

## 2018-05-14 ENCOUNTER — APPOINTMENT (OUTPATIENT)
Dept: ORTHOPEDIC SURGERY | Facility: CLINIC | Age: 68
End: 2018-05-14
Payer: COMMERCIAL

## 2018-05-14 PROCEDURE — 99024 POSTOP FOLLOW-UP VISIT: CPT

## 2018-05-14 PROCEDURE — 72100 X-RAY EXAM L-S SPINE 2/3 VWS: CPT

## 2018-05-14 NOTE — ED PROVIDER NOTE - MUSCULOSKELETAL, MLM
Called in live. Last seen on 7/10/17. Last written on 7/10/17. No other request for refills seen. Message forwarded from call center      Pt is checking on the status of a refill request that was sent by pharmacy on 05.02.18     \" compounded Rx \"     Torrey Wallis pt has no contact #       Best contact for the pt is [phone] Please call in refill. Let pt know this is the first refill request we received on this med. Spoke w/ [facility] after pt stated they don't have it. The pharmacy is stating they do not have this medication. Right inguinal hernia  02/27/2018    Active  Antonette Aguilar Spine appears normal, range of motion is not limited, no muscle or joint tenderness

## 2018-06-04 ENCOUNTER — FORM ENCOUNTER (OUTPATIENT)
Age: 68
End: 2018-06-04

## 2018-06-07 ENCOUNTER — CHART COPY (OUTPATIENT)
Age: 68
End: 2018-06-07

## 2018-06-13 ENCOUNTER — APPOINTMENT (OUTPATIENT)
Dept: ORTHOPEDIC SURGERY | Facility: CLINIC | Age: 68
End: 2018-06-13
Payer: COMMERCIAL

## 2018-06-13 PROCEDURE — 99024 POSTOP FOLLOW-UP VISIT: CPT

## 2018-06-13 PROCEDURE — 72100 X-RAY EXAM L-S SPINE 2/3 VWS: CPT

## 2018-06-21 ENCOUNTER — FORM ENCOUNTER (OUTPATIENT)
Age: 68
End: 2018-06-21

## 2018-06-22 ENCOUNTER — OUTPATIENT (OUTPATIENT)
Dept: OUTPATIENT SERVICES | Facility: HOSPITAL | Age: 68
LOS: 1 days | End: 2018-06-22
Payer: MEDICARE

## 2018-06-22 ENCOUNTER — APPOINTMENT (OUTPATIENT)
Dept: MRI IMAGING | Facility: HOSPITAL | Age: 68
End: 2018-06-22
Payer: MEDICARE

## 2018-06-22 DIAGNOSIS — Z98.890 OTHER SPECIFIED POSTPROCEDURAL STATES: Chronic | ICD-10-CM

## 2018-06-22 DIAGNOSIS — Z00.8 ENCOUNTER FOR OTHER GENERAL EXAMINATION: ICD-10-CM

## 2018-06-22 DIAGNOSIS — Z98.1 ARTHRODESIS STATUS: Chronic | ICD-10-CM

## 2018-06-22 PROCEDURE — 73721 MRI JNT OF LWR EXTRE W/O DYE: CPT

## 2018-06-22 PROCEDURE — 73721 MRI JNT OF LWR EXTRE W/O DYE: CPT | Mod: 26,RT

## 2018-06-29 ENCOUNTER — MEDICATION RENEWAL (OUTPATIENT)
Age: 68
End: 2018-06-29

## 2018-06-29 DIAGNOSIS — K21.9 GASTRO-ESOPHAGEAL REFLUX DISEASE W/OUT ESOPHAGITIS: ICD-10-CM

## 2018-06-29 RX ORDER — PANTOPRAZOLE 40 MG/1
40 TABLET, DELAYED RELEASE ORAL
Qty: 90 | Refills: 1 | Status: ACTIVE | COMMUNITY
Start: 2018-06-29 | End: 1900-01-01

## 2018-07-06 NOTE — H&P PST ADULT - PAIN RATING AT REST
Chief Complaint   Patient presents with   • Surgical Followup     S/P CE IOL 07/06/18 OD       History of Present Illness:     07/06/2018 cataract extraction and intraocular lens placement in the right eye - patient states all things considering right eye feels fine, feels some grittiness, using both set of drops as prescribed, right eye. Patient states some tearing as well. Can the right lens from patient glasses be taking out today?      Review of Systems:   Eye:  Negative for pain, redness, irritation, dryness, burning, itching, discharge, photophobia, photopsia, floaters    Neuro:  Negative for diplopia, numbness, tingling, seizures, lightheadedness, dizziness, headache    EYE MEDS:  Prednisolone - 4 x a day, right eye.  Ofloxacin - 4 x a day, right eye.     Past Ocular History:   1.        Macular drusen  2.        Unspecified visual disturbance  3.        Combined cataracts    Family Ocular History:  Glaucoma  No          Macular Degeneration No          Cataracts No           Retinal Detachment Yes - Brother          Amblyopia or Strabismus No        Review of Systems: Per History of Present Illness; all others negative.    Past Medical, Social, and Family History: Reviewed; see chart.    Medications, Allergies: Reviewed; see chart.    Examination: For full examination and ancillary testing details, see chart.    Assessment:    1. Pseudophakia of right eye        Plan:  Doing well postop day 1 from cataract extraction and intraocular lens placement right eye. We discussed retinal detachment, retinal tear and endophthalmitis signs and symptoms, and he knows to call immediately for any concerns. Continue eye shield while sleeping for 1 week after surgery, and continue drops as previously instructed.    The patient was encouraged to contact the office as soon as possible for any questions or new eye concerns, including visual changes, ocular discomfort, headaches, and flashes/floaters.    Follow-up:  Return in  about 1 week (around 7/13/2018) for Previously scheduled follow-up.       0

## 2018-07-09 ENCOUNTER — APPOINTMENT (OUTPATIENT)
Dept: UROLOGY | Facility: CLINIC | Age: 68
End: 2018-07-09
Payer: MEDICARE

## 2018-07-09 PROCEDURE — 99212 OFFICE O/P EST SF 10 MIN: CPT

## 2018-07-11 ENCOUNTER — APPOINTMENT (OUTPATIENT)
Dept: UROLOGY | Facility: CLINIC | Age: 68
End: 2018-07-11
Payer: MEDICARE

## 2018-07-11 PROCEDURE — 99213 OFFICE O/P EST LOW 20 MIN: CPT

## 2018-07-12 ENCOUNTER — APPOINTMENT (OUTPATIENT)
Dept: UROLOGY | Facility: CLINIC | Age: 68
End: 2018-07-12
Payer: MEDICARE

## 2018-07-12 ENCOUNTER — OUTPATIENT (OUTPATIENT)
Dept: OUTPATIENT SERVICES | Facility: HOSPITAL | Age: 68
LOS: 1 days | End: 2018-07-12
Payer: MEDICARE

## 2018-07-12 VITALS
SYSTOLIC BLOOD PRESSURE: 132 MMHG | TEMPERATURE: 98.1 F | DIASTOLIC BLOOD PRESSURE: 83 MMHG | RESPIRATION RATE: 16 BRPM | HEART RATE: 69 BPM

## 2018-07-12 DIAGNOSIS — Z78.9 OTHER SPECIFIED HEALTH STATUS: ICD-10-CM

## 2018-07-12 DIAGNOSIS — Z98.890 OTHER SPECIFIED POSTPROCEDURAL STATES: Chronic | ICD-10-CM

## 2018-07-12 DIAGNOSIS — Z85.828 PERSONAL HISTORY OF OTHER MALIGNANT NEOPLASM OF SKIN: ICD-10-CM

## 2018-07-12 DIAGNOSIS — Z82.61 FAMILY HISTORY OF ARTHRITIS: ICD-10-CM

## 2018-07-12 DIAGNOSIS — Z87.39 PERSONAL HISTORY OF OTHER DISEASES OF THE MUSCULOSKELETAL SYSTEM AND CONNECTIVE TISSUE: ICD-10-CM

## 2018-07-12 DIAGNOSIS — Z80.9 FAMILY HISTORY OF MALIGNANT NEOPLASM, UNSPECIFIED: ICD-10-CM

## 2018-07-12 DIAGNOSIS — Z86.79 PERSONAL HISTORY OF OTHER DISEASES OF THE CIRCULATORY SYSTEM: ICD-10-CM

## 2018-07-12 DIAGNOSIS — Z98.1 ARTHRODESIS STATUS: Chronic | ICD-10-CM

## 2018-07-12 PROCEDURE — 99213 OFFICE O/P EST LOW 20 MIN: CPT | Mod: 25

## 2018-07-12 PROCEDURE — 51702 INSERT TEMP BLADDER CATH: CPT

## 2018-07-18 ENCOUNTER — APPOINTMENT (OUTPATIENT)
Dept: UROLOGY | Facility: CLINIC | Age: 68
End: 2018-07-18
Payer: MEDICARE

## 2018-07-18 VITALS
RESPIRATION RATE: 16 BRPM | HEART RATE: 60 BPM | TEMPERATURE: 97.7 F | DIASTOLIC BLOOD PRESSURE: 58 MMHG | SYSTOLIC BLOOD PRESSURE: 94 MMHG

## 2018-07-18 PROBLEM — M54.32 SCIATICA, LEFT SIDE: Chronic | Status: ACTIVE | Noted: 2018-04-24

## 2018-07-18 PROBLEM — I48.91 UNSPECIFIED ATRIAL FIBRILLATION: Chronic | Status: ACTIVE | Noted: 2018-02-28

## 2018-07-18 PROBLEM — E78.5 HYPERLIPIDEMIA, UNSPECIFIED: Chronic | Status: ACTIVE | Noted: 2018-02-28

## 2018-07-18 PROBLEM — I10 ESSENTIAL (PRIMARY) HYPERTENSION: Chronic | Status: ACTIVE | Noted: 2018-02-28

## 2018-07-18 PROBLEM — M51.26 OTHER INTERVERTEBRAL DISC DISPLACEMENT, LUMBAR REGION: Chronic | Status: ACTIVE | Noted: 2018-04-24

## 2018-07-18 PROBLEM — M48.061 SPINAL STENOSIS, LUMBAR REGION WITHOUT NEUROGENIC CLAUDICATION: Chronic | Status: ACTIVE | Noted: 2018-04-24

## 2018-07-18 PROBLEM — M48.02 SPINAL STENOSIS, CERVICAL REGION: Chronic | Status: ACTIVE | Noted: 2018-02-28

## 2018-07-18 PROBLEM — G58.9 MONONEUROPATHY, UNSPECIFIED: Chronic | Status: ACTIVE | Noted: 2018-04-24

## 2018-07-18 PROCEDURE — 99214 OFFICE O/P EST MOD 30 MIN: CPT

## 2018-07-19 ENCOUNTER — APPOINTMENT (OUTPATIENT)
Dept: UROLOGY | Facility: CLINIC | Age: 68
End: 2018-07-19
Payer: MEDICARE

## 2018-07-19 LAB
PSA FREE FLD-MCNC: 24.4
PSA FREE SERPL-MCNC: 0.87 NG/ML
PSA SERPL-MCNC: 3.57 NG/ML

## 2018-07-19 PROCEDURE — 99213 OFFICE O/P EST LOW 20 MIN: CPT

## 2018-07-20 DIAGNOSIS — R33.9 RETENTION OF URINE, UNSPECIFIED: ICD-10-CM

## 2018-07-20 DIAGNOSIS — M48.02 SPINAL STENOSIS, CERVICAL REGION: ICD-10-CM

## 2018-07-20 DIAGNOSIS — M48.07 SPINAL STENOSIS, LUMBOSACRAL REGION: ICD-10-CM

## 2018-07-20 DIAGNOSIS — N40.1 BENIGN PROSTATIC HYPERPLASIA WITH LOWER URINARY TRACT SYMPTOMS: ICD-10-CM

## 2018-07-25 ENCOUNTER — APPOINTMENT (OUTPATIENT)
Dept: ORTHOPEDIC SURGERY | Facility: CLINIC | Age: 68
End: 2018-07-25
Payer: MEDICARE

## 2018-07-25 DIAGNOSIS — M47.10 OTHER SPONDYLOSIS WITH MYELOPATHY, SITE UNSPECIFIED: ICD-10-CM

## 2018-07-25 PROCEDURE — 72100 X-RAY EXAM L-S SPINE 2/3 VWS: CPT

## 2018-07-25 PROCEDURE — 99024 POSTOP FOLLOW-UP VISIT: CPT

## 2018-08-08 ENCOUNTER — APPOINTMENT (OUTPATIENT)
Dept: UROLOGY | Facility: CLINIC | Age: 68
End: 2018-08-08
Payer: MEDICARE

## 2018-08-08 PROCEDURE — 99213 OFFICE O/P EST LOW 20 MIN: CPT

## 2018-09-19 ENCOUNTER — APPOINTMENT (OUTPATIENT)
Dept: ORTHOPEDIC SURGERY | Facility: CLINIC | Age: 68
End: 2018-09-19
Payer: MEDICARE

## 2018-09-19 PROCEDURE — 99213 OFFICE O/P EST LOW 20 MIN: CPT

## 2018-09-19 PROCEDURE — 72100 X-RAY EXAM L-S SPINE 2/3 VWS: CPT

## 2018-10-08 ENCOUNTER — APPOINTMENT (OUTPATIENT)
Dept: UROLOGY | Facility: CLINIC | Age: 68
End: 2018-10-08
Payer: MEDICARE

## 2018-10-08 PROCEDURE — 51798 US URINE CAPACITY MEASURE: CPT

## 2018-10-08 PROCEDURE — 99213 OFFICE O/P EST LOW 20 MIN: CPT | Mod: 25

## 2018-10-15 ENCOUNTER — RX RENEWAL (OUTPATIENT)
Age: 68
End: 2018-10-15

## 2018-11-07 ENCOUNTER — RX RENEWAL (OUTPATIENT)
Age: 68
End: 2018-11-07

## 2018-11-20 ENCOUNTER — RX RENEWAL (OUTPATIENT)
Age: 68
End: 2018-11-20

## 2018-12-17 ENCOUNTER — APPOINTMENT (OUTPATIENT)
Dept: ORTHOPEDIC SURGERY | Facility: CLINIC | Age: 68
End: 2018-12-17
Payer: MEDICARE

## 2018-12-17 VITALS
HEIGHT: 66 IN | SYSTOLIC BLOOD PRESSURE: 143 MMHG | BODY MASS INDEX: 24.59 KG/M2 | WEIGHT: 153 LBS | HEART RATE: 60 BPM | DIASTOLIC BLOOD PRESSURE: 82 MMHG

## 2018-12-17 PROCEDURE — 99214 OFFICE O/P EST MOD 30 MIN: CPT

## 2018-12-17 PROCEDURE — 72100 X-RAY EXAM L-S SPINE 2/3 VWS: CPT

## 2018-12-27 NOTE — ED ADULT NURSE NOTE - DRUG PRE-SCREENING (DAST -1)
How Did Your Unwanted Hair Appear?: gradual in onset How Severe Is Your Unwanted Hair?: mild Statement Selected

## 2019-03-10 ENCOUNTER — EMERGENCY (EMERGENCY)
Facility: HOSPITAL | Age: 69
LOS: 1 days | Discharge: HOME | End: 2019-03-10
Attending: EMERGENCY MEDICINE | Admitting: EMERGENCY MEDICINE
Payer: MEDICARE

## 2019-03-10 VITALS
OXYGEN SATURATION: 96 % | TEMPERATURE: 98 F | DIASTOLIC BLOOD PRESSURE: 76 MMHG | WEIGHT: 240.08 LBS | RESPIRATION RATE: 18 BRPM | SYSTOLIC BLOOD PRESSURE: 124 MMHG | HEART RATE: 58 BPM

## 2019-03-10 VITALS
RESPIRATION RATE: 16 BRPM | HEART RATE: 56 BPM | SYSTOLIC BLOOD PRESSURE: 143 MMHG | DIASTOLIC BLOOD PRESSURE: 77 MMHG | OXYGEN SATURATION: 96 %

## 2019-03-10 DIAGNOSIS — Z98.890 OTHER SPECIFIED POSTPROCEDURAL STATES: Chronic | ICD-10-CM

## 2019-03-10 DIAGNOSIS — Z98.1 ARTHRODESIS STATUS: Chronic | ICD-10-CM

## 2019-03-10 PROCEDURE — 99284 EMERGENCY DEPT VISIT MOD MDM: CPT | Mod: 25

## 2019-03-10 PROCEDURE — 12011 RPR F/E/E/N/L/M 2.5 CM/<: CPT

## 2019-03-10 PROCEDURE — 90471 IMMUNIZATION ADMIN: CPT

## 2019-03-10 PROCEDURE — 70450 CT HEAD/BRAIN W/O DYE: CPT

## 2019-03-10 PROCEDURE — 90715 TDAP VACCINE 7 YRS/> IM: CPT

## 2019-03-10 PROCEDURE — 70450 CT HEAD/BRAIN W/O DYE: CPT | Mod: 26

## 2019-03-10 RX ORDER — GLUCOSAMINE/MSM/CHONDROITIN A 750-375MG
2 TABLET ORAL
Qty: 0 | Refills: 0 | COMMUNITY

## 2019-03-10 RX ORDER — EZETIMIBE 10 MG/1
0 TABLET ORAL
Qty: 0 | Refills: 0 | COMMUNITY

## 2019-03-10 RX ORDER — EZETIMIBE 10 MG/1
1 TABLET ORAL
Qty: 0 | Refills: 0 | COMMUNITY

## 2019-03-10 RX ORDER — TAMSULOSIN HYDROCHLORIDE 0.4 MG/1
1 CAPSULE ORAL
Qty: 0 | Refills: 0 | COMMUNITY

## 2019-03-10 RX ORDER — FINASTERIDE 5 MG/1
1 TABLET, FILM COATED ORAL
Qty: 0 | Refills: 0 | COMMUNITY

## 2019-03-10 RX ORDER — TETANUS TOXOID, REDUCED DIPHTHERIA TOXOID AND ACELLULAR PERTUSSIS VACCINE, ADSORBED 5; 2.5; 8; 8; 2.5 [IU]/.5ML; [IU]/.5ML; UG/.5ML; UG/.5ML; UG/.5ML
0.5 SUSPENSION INTRAMUSCULAR ONCE
Qty: 0 | Refills: 0 | Status: COMPLETED | OUTPATIENT
Start: 2019-03-10 | End: 2019-03-10

## 2019-03-10 RX ORDER — CHOLECALCIFEROL (VITAMIN D3) 125 MCG
1 CAPSULE ORAL
Qty: 0 | Refills: 0 | COMMUNITY

## 2019-03-10 RX ADMIN — TETANUS TOXOID, REDUCED DIPHTHERIA TOXOID AND ACELLULAR PERTUSSIS VACCINE, ADSORBED 0.5 MILLILITER(S): 5; 2.5; 8; 8; 2.5 SUSPENSION INTRAMUSCULAR at 12:59

## 2019-03-10 NOTE — ED PROVIDER NOTE - CARE PLAN
Principal Discharge DX:	Laceration of eyebrow and forehead, right, initial encounter  Secondary Diagnosis:	Fall, initial encounter

## 2019-03-10 NOTE — ED PROVIDER NOTE - OBJECTIVE STATEMENT
67 y/o M with of afib on daily ASA presents to the ED with c/o facial laceration s/p mechanical trip and fall last night at 11pm, on the rug. Patient reports he hit his head against the night stand table. Reports he was able to stop the bleeding, didn't realize how deep it was until this morning. He denies LOC, dizziness, HA, neck pain, paresthesias or all other complaints. Last tetanus unknown.

## 2019-03-10 NOTE — ED PROVIDER NOTE - NSFOLLOWUPINSTRUCTIONS_ED_ALL_ED_FT
Keep sutures/staples covered & dry for 2 days, then clean with soap/water, apply bacitracin and cover the wound once daily.    Return to ED or follow up with your primary care physi can for suture removal in 7 days    Any increased pain, redness, streaking (red lines), swelling, fever, chills please immediately return to ER.

## 2019-03-10 NOTE — ED ADULT NURSE REASSESSMENT NOTE - NS ED NURSE REASSESS COMMENT FT1
Pt given Tetanus shot as ordered. When pt undressed for shot, +abrasion and bruising noted to Rt flank area. No tenderness noted with palp.  No diff breathing. Pt denies any hematuria. Yancy BACON made aware. Will continue to monitor.

## 2019-03-10 NOTE — ED PROVIDER NOTE - ATTENDING CONTRIBUTION TO CARE
Eval with ARLEEN Haines. Patient with +2cm deep linear laceration with clean edges, medial to right eyebrow. S/P fall with head injury on night stand. Will need CT brain and repair/closure. Precaution for returning prn signs of infection. Return in 7 days for removal. I performed a face to face bedside interview with patient regarding history of present illness, review of symptoms and past medical history. I completed an independent physical exam.  I have discussed the patient's plan of care with Physician Assistant (PA). I agree with note as stated above, having amended the EMR as needed to reflect my findings.   This includes History of Present Illness, HIV, Past Medical/Surgical/Family/Social History, Allergies and Home Medications, Review of Systems, Physical Exam, and any Progress Notes during the time I functioned as the attending physician for this patient.

## 2019-03-10 NOTE — ED ADULT NURSE NOTE - OBJECTIVE STATEMENT
68 yr old male to ED A&OX3 presents with +laceration to rt eyebrow. Pt tripped on rug last night at 11pm. Pt denies any LOC. Pt reports that he controlled bleeding and placed ice on the area.  This am when pt woke up he noticed that laceration was deeper than he thought.  Pt was on Xarelto for A-fib but was discontinued from it 6 months ago, now pt reports that he only takes ASA. PERRL @2mm. No acute resp distress noted. Resp even and unlabored. OSBORNE. +strength and sensation to all 4 ext. Skin warm and dry. Safety maintained.

## 2019-03-18 ENCOUNTER — APPOINTMENT (OUTPATIENT)
Dept: ORTHOPEDIC SURGERY | Facility: CLINIC | Age: 69
End: 2019-03-18
Payer: MEDICARE

## 2019-03-18 PROCEDURE — 72100 X-RAY EXAM L-S SPINE 2/3 VWS: CPT

## 2019-03-18 PROCEDURE — 99214 OFFICE O/P EST MOD 30 MIN: CPT

## 2019-03-18 NOTE — ED PROVIDER NOTE - CLINICAL SUMMARY MEDICAL DECISION MAKING FREE TEXT BOX
used imp- laceration   plan- head CT given patient on ASA, tdap, lac repair   patient refused pain meds

## 2019-03-20 ENCOUNTER — RX CHANGE (OUTPATIENT)
Age: 69
End: 2019-03-20

## 2019-03-22 NOTE — HISTORY OF PRESENT ILLNESS
[All Other ROS Normal] : All other review of systems are negative except as noted [All Hx] : past medical, family, and social [All] : medication and allergy [FreeTextEntry1] : s/p L2-5 laminectomy with L3-4 fusion  [FreeTextEntry2] : Mr. Daugherty is s/p L2-5 laminectomy with L3-4 fusion on 5/1/18.  His preop symptoms are gone.  Overall he is doing great.  His son got  on Sunday and he was able to enjoy the party and dance without any restrictions.

## 2019-03-22 NOTE — PHYSICAL EXAM
[Poor Appearance] : well-appearing [Acute Distress] : not in acute distress [Obese] : not obese [Abl Mood] : in a normal mood [Abl Affect] : with normal affect [Poor Coordination] : normal coordination [Disorientation] : oriented x 3 [Antalgic] : not antalgic [FreeTextEntry2] : His lumbar incision is healed Stable strength and sensation bilateral lower extremities. [de-identified] : AP lateral x-rays demonstrates instrumented L3-4 fusion Improve posterior lateral arthrodesis.

## 2019-04-03 ENCOUNTER — RX RENEWAL (OUTPATIENT)
Age: 69
End: 2019-04-03

## 2019-05-31 ENCOUNTER — APPOINTMENT (OUTPATIENT)
Dept: UROLOGY | Facility: CLINIC | Age: 69
End: 2019-05-31
Payer: MEDICARE

## 2019-05-31 PROCEDURE — 99213 OFFICE O/P EST LOW 20 MIN: CPT

## 2019-05-31 RX ORDER — CHLORTHALIDONE 50 MG/1
TABLET ORAL
Refills: 0 | Status: ACTIVE | COMMUNITY

## 2019-05-31 RX ORDER — CYCLOBENZAPRINE HYDROCHLORIDE 10 MG/1
10 TABLET, FILM COATED ORAL 3 TIMES DAILY
Qty: 60 | Refills: 0 | Status: DISCONTINUED | COMMUNITY
Start: 2018-03-14 | End: 2019-05-31

## 2019-05-31 RX ORDER — ASPIRIN 325 MG/1
TABLET, FILM COATED ORAL
Refills: 0 | Status: ACTIVE | COMMUNITY

## 2019-05-31 RX ORDER — METOPROLOL TARTRATE 75 MG/1
TABLET, FILM COATED ORAL
Refills: 0 | Status: ACTIVE | COMMUNITY

## 2019-05-31 RX ORDER — PREDNISONE 20 MG/1
20 TABLET ORAL DAILY
Qty: 18 | Refills: 0 | Status: DISCONTINUED | COMMUNITY
Start: 2018-02-21 | End: 2019-05-31

## 2019-05-31 RX ORDER — DRONEDARONE 400 MG/1
TABLET, FILM COATED ORAL
Refills: 0 | Status: ACTIVE | COMMUNITY

## 2019-05-31 RX ORDER — OXYCODONE 5 MG/1
5 TABLET ORAL
Qty: 40 | Refills: 0 | Status: DISCONTINUED | COMMUNITY
Start: 2018-03-21 | End: 2019-05-31

## 2019-05-31 RX ORDER — AMLODIPINE BESYLATE 5 MG/1
TABLET ORAL
Refills: 0 | Status: ACTIVE | COMMUNITY

## 2019-05-31 RX ORDER — EZETIMIBE 10 MG/1
TABLET ORAL
Refills: 0 | Status: ACTIVE | COMMUNITY

## 2019-05-31 NOTE — ASSESSMENT
[FreeTextEntry1] : Now voiding on his own and doing well with minimal PVR. \par --Cont flomax and finasteride\par --PSA today\par --RTC in 1y with PSA, PVR, MCKENZIE\par

## 2019-05-31 NOTE — PHYSICAL EXAM
[General Appearance - Well Developed] : well developed [General Appearance - Well Nourished] : well nourished [General Appearance - In No Acute Distress] : no acute distress [Abdomen Soft] : soft [Abdomen Tenderness] : non-tender [Costovertebral Angle Tenderness] : no ~M costovertebral angle tenderness [Skin Color & Pigmentation] : normal skin color and pigmentation [Edema] : no peripheral edema [Exaggerated Use Of Accessory Muscles For Inspiration] : no accessory muscle use [Oriented To Time, Place, And Person] : oriented to person, place, and time [FreeTextEntry1] : ambulating on own;

## 2019-05-31 NOTE — HISTORY OF PRESENT ILLNESS
[FreeTextEntry1] : 68yo gentleman with cc of BPH and hx of urinary retention. Pt with cervical laminectomy on 3/2018. He was in the hospital for 4 days. He felt he was urinating well. After discharge, he had progressive difficulty voiding and went to ER and had guerrero placed. He had post obstructive diuresis with electrolyte  abnormalities. He was kept in CDU after having 7L output overnight. MCKENZIE was benign with gland estimated at 50cc. He was started on flomax. Guerrero was removed and pt was voiding in small volumes and with progressive discomfort (SP pain). He returned to the office and bladder scan showed >1L. Guerrero was replaced and again, he had post obstructive diureses with >2L drain with initial placement and over first hour and then drained over 600cc in next 30min. Vitals stable. He was evaluated in ER and sent home. UCx from that guerrero placement showed enterococcus and pt was placed on augmentin. He was also started on finasteride\par \par He eventually was taught CIC and increasingly was able to void on his own. he was able to stop CIC. He is very happy with his sx. He is voiding on his own. PVR at last visit in Oct was 52. He has been voiding well on his own. PSA 3.57 in June 2018. At baseline, no urinary complaints. Gets up 2-3 times. No straining. No feelings of incomplete emptying. No family hx of prostate ca.

## 2019-06-06 LAB
PSA FREE FLD-MCNC: 27 %
PSA FREE SERPL-MCNC: 0.9 NG/ML
PSA SERPL-MCNC: 3.32 NG/ML

## 2019-09-16 ENCOUNTER — APPOINTMENT (OUTPATIENT)
Dept: ORTHOPEDIC SURGERY | Facility: CLINIC | Age: 69
End: 2019-09-16
Payer: MEDICARE

## 2019-09-16 PROCEDURE — 99214 OFFICE O/P EST MOD 30 MIN: CPT

## 2019-09-16 PROCEDURE — 72100 X-RAY EXAM L-S SPINE 2/3 VWS: CPT

## 2019-09-16 PROCEDURE — 72040 X-RAY EXAM NECK SPINE 2-3 VW: CPT

## 2019-09-16 NOTE — HISTORY OF PRESENT ILLNESS
[de-identified] : Mr. Daugherty is s/p L2-5 laminectomy with L3-4 fusion on 5/1/18. His preop symptoms are gone. Overall he is doing great.

## 2020-04-02 ENCOUNTER — RX RENEWAL (OUTPATIENT)
Age: 70
End: 2020-04-02

## 2020-07-24 ENCOUNTER — APPOINTMENT (OUTPATIENT)
Dept: UROLOGY | Facility: CLINIC | Age: 70
End: 2020-07-24

## 2020-07-27 ENCOUNTER — APPOINTMENT (OUTPATIENT)
Dept: UROLOGY | Facility: CLINIC | Age: 70
End: 2020-07-27
Payer: MEDICARE

## 2020-07-27 PROCEDURE — 99213 OFFICE O/P EST LOW 20 MIN: CPT

## 2020-07-27 NOTE — PHYSICAL EXAM
[General Appearance - Well Developed] : well developed [General Appearance - Well Nourished] : well nourished [General Appearance - In No Acute Distress] : no acute distress [Abdomen Tenderness] : non-tender [Abdomen Soft] : soft [Skin Color & Pigmentation] : normal skin color and pigmentation [Costovertebral Angle Tenderness] : no ~M costovertebral angle tenderness [Edema] : no peripheral edema [Exaggerated Use Of Accessory Muscles For Inspiration] : no accessory muscle use [Oriented To Time, Place, And Person] : oriented to person, place, and time [FreeTextEntry1] : ambulating on own;

## 2020-07-27 NOTE — HISTORY OF PRESENT ILLNESS
[FreeTextEntry1] : 71yo gentleman with cc of BPH and hx of urinary retention. Pt with cervical laminectomy on 3/2018. He was in the hospital for 4 days. He felt he was urinating well. After discharge, he had progressive difficulty voiding and went to ER and had guerrero placed. He had post obstructive diuresis with electrolyte  abnormalities. MCKENZIE was benign with gland estimated at 50cc. He was started on flomax and eventually was also started on finasteride. He was taught CIC and increasingly was able to void on his own. he was able to stop CIC. He is very happy with his sx. He is voiding on his own. He has been voiding well on his own. PSA 3.57 in June 2018. At baseline, no urinary complaints. Gets up 2-3 times. No straining. No feelings of incomplete emptying. No family hx of prostate ca. \par \par He continues to do well. No issues.

## 2020-07-28 LAB — PSA SERPL-MCNC: 2.51 NG/ML

## 2020-08-02 ENCOUNTER — TRANSCRIPTION ENCOUNTER (OUTPATIENT)
Age: 70
End: 2020-08-02

## 2020-08-03 ENCOUNTER — EMERGENCY (EMERGENCY)
Facility: HOSPITAL | Age: 70
LOS: 1 days | Discharge: HOME | End: 2020-08-03
Attending: EMERGENCY MEDICINE | Admitting: EMERGENCY MEDICINE
Payer: MEDICARE

## 2020-08-03 VITALS
RESPIRATION RATE: 17 BRPM | TEMPERATURE: 98 F | SYSTOLIC BLOOD PRESSURE: 127 MMHG | HEART RATE: 68 BPM | DIASTOLIC BLOOD PRESSURE: 87 MMHG | WEIGHT: 240.08 LBS | OXYGEN SATURATION: 97 % | HEIGHT: 77 IN

## 2020-08-03 DIAGNOSIS — Z98.890 OTHER SPECIFIED POSTPROCEDURAL STATES: Chronic | ICD-10-CM

## 2020-08-03 DIAGNOSIS — Z98.1 ARTHRODESIS STATUS: Chronic | ICD-10-CM

## 2020-08-03 DIAGNOSIS — S09.90XA UNSPECIFIED INJURY OF HEAD, INITIAL ENCOUNTER: ICD-10-CM

## 2020-08-03 PROCEDURE — 73562 X-RAY EXAM OF KNEE 3: CPT

## 2020-08-03 PROCEDURE — 73562 X-RAY EXAM OF KNEE 3: CPT | Mod: 26,LT

## 2020-08-03 PROCEDURE — 26725 TREAT FINGER FRACTURE EACH: CPT | Mod: F4

## 2020-08-03 PROCEDURE — 73140 X-RAY EXAM OF FINGER(S): CPT | Mod: 26,LT

## 2020-08-03 PROCEDURE — 71250 CT THORAX DX C-: CPT

## 2020-08-03 PROCEDURE — 73130 X-RAY EXAM OF HAND: CPT | Mod: 26,50

## 2020-08-03 PROCEDURE — 99284 EMERGENCY DEPT VISIT MOD MDM: CPT

## 2020-08-03 PROCEDURE — 70450 CT HEAD/BRAIN W/O DYE: CPT | Mod: 26

## 2020-08-03 PROCEDURE — 99285 EMERGENCY DEPT VISIT HI MDM: CPT | Mod: 25

## 2020-08-03 PROCEDURE — 13131 CMPLX RPR F/C/C/M/N/AX/G/H/F: CPT | Mod: XU

## 2020-08-03 PROCEDURE — 73140 X-RAY EXAM OF FINGER(S): CPT

## 2020-08-03 PROCEDURE — 73130 X-RAY EXAM OF HAND: CPT

## 2020-08-03 PROCEDURE — 70450 CT HEAD/BRAIN W/O DYE: CPT

## 2020-08-03 PROCEDURE — 71250 CT THORAX DX C-: CPT | Mod: 26

## 2020-08-03 RX ORDER — DRONEDARONE 400 MG/1
1 TABLET, FILM COATED ORAL
Qty: 0 | Refills: 0 | DISCHARGE

## 2020-08-03 RX ORDER — CEPHALEXIN 500 MG
1 CAPSULE ORAL
Qty: 14 | Refills: 0
Start: 2020-08-03 | End: 2020-08-09

## 2020-08-03 RX ORDER — FINASTERIDE 5 MG/1
1 TABLET, FILM COATED ORAL
Qty: 0 | Refills: 0 | DISCHARGE

## 2020-08-03 RX ORDER — GLUCOSAMINE/MSM/CHONDROITIN A 750-375MG
0 TABLET ORAL
Qty: 0 | Refills: 0 | DISCHARGE

## 2020-08-03 RX ORDER — ASPIRIN/CALCIUM CARB/MAGNESIUM 324 MG
1 TABLET ORAL
Qty: 0 | Refills: 0 | DISCHARGE

## 2020-08-03 RX ORDER — CEPHALEXIN 500 MG
500 CAPSULE ORAL ONCE
Refills: 0 | Status: COMPLETED | OUTPATIENT
Start: 2020-08-03 | End: 2020-08-03

## 2020-08-03 RX ORDER — BENAZEPRIL HYDROCHLORIDE 40 MG/1
1 TABLET ORAL
Qty: 0 | Refills: 0 | DISCHARGE

## 2020-08-03 RX ORDER — MULTIVIT-MIN/FERROUS GLUCONATE 9 MG/15 ML
1 LIQUID (ML) ORAL
Qty: 0 | Refills: 0 | DISCHARGE

## 2020-08-03 RX ADMIN — Medication 500 MILLIGRAM(S): at 10:12

## 2020-08-03 NOTE — ED ADULT NURSE REASSESSMENT NOTE - NS ED NURSE REASSESS COMMENT FT1
Left hand injuries reduced and repair by plastic surgeon. Pt reports the feel better and wants to go home.

## 2020-08-03 NOTE — ED PROVIDER NOTE - SECONDARY DIAGNOSIS.
Scalp laceration, initial encounter Laceration of left little finger without damage to nail, foreign body presence unspecified, initial encounter Laceration of left knee, initial encounter Open displaced fracture of middle phalanx of left little finger, initial encounter Open displaced fracture of proximal phalanx of left little finger, initial encounter

## 2020-08-03 NOTE — ED PROVIDER NOTE - PATIENT PORTAL LINK FT
You can access the FollowMyHealth Patient Portal offered by [facility] by registering at the following website: [email or web address]. By joining [facility]’s FollowMyHealth portal, you will also be able to view your health information using other applications (apps) compatible with our system.

## 2020-08-03 NOTE — ED PROVIDER NOTE - CARE PLAN
Principal Discharge DX:	Closed head injury, initial encounter  Secondary Diagnosis:	Scalp laceration, initial encounter  Secondary Diagnosis:	Laceration of left little finger without damage to nail, foreign body presence unspecified, initial encounter  Secondary Diagnosis:	Laceration of left knee, initial encounter Principal Discharge DX:	Closed head injury, initial encounter  Secondary Diagnosis:	Scalp laceration, initial encounter  Secondary Diagnosis:	Laceration of left little finger without damage to nail, foreign body presence unspecified, initial encounter  Secondary Diagnosis:	Laceration of left knee, initial encounter  Secondary Diagnosis:	Open displaced fracture of middle phalanx of left little finger, initial encounter Principal Discharge DX:	Closed head injury, initial encounter  Secondary Diagnosis:	Scalp laceration, initial encounter  Secondary Diagnosis:	Laceration of left little finger without damage to nail, foreign body presence unspecified, initial encounter  Secondary Diagnosis:	Laceration of left knee, initial encounter  Secondary Diagnosis:	Open displaced fracture of proximal phalanx of left little finger, initial encounter

## 2020-08-03 NOTE — ED ADULT NURSE NOTE - NSIMPLEMENTINTERV_GEN_ALL_ED
Implemented All Fall Risk Interventions:  House to call system. Call bell, personal items and telephone within reach. Instruct patient to call for assistance. Room bathroom lighting operational. Non-slip footwear when patient is off stretcher. Physically safe environment: no spills, clutter or unnecessary equipment. Stretcher in lowest position, wheels locked, appropriate side rails in place. Provide visual cue, wrist band, yellow gown, etc. Monitor gait and stability. Monitor for mental status changes and reorient to person, place, and time. Review medications for side effects contributing to fall risk. Reinforce activity limits and safety measures with patient and family.

## 2020-08-03 NOTE — ED PROVIDER NOTE - PHYSICAL EXAMINATION
Skin: 2cm old laceration to the right frontoparietal scalp.   Left hand 5th digit with extensive partial and full skin avulsions. There is a deep finger laceration to the fifth digit at the middle phalanx. No fb seen. Left knee with several small lacerations, one is 2cm, the others are superficial.  B/L hands with swelling noted. No reproducible tenderness. Skin: 2cm old laceration to the right frontoparietal scalp.   Left hand 5th digit with extensive partial and full skin avulsions. There is a deep finger laceration to the fifth digit at the middle phalanx. There is notable angulation of the left fifth finger. No fb seen. Left knee with several small lacerations, one is 2cm, the others are superficial.  B/L hands with swelling noted. No reproducible tenderness.

## 2020-08-03 NOTE — ED PROVIDER NOTE - MUSCULOSKELETAL, MLM
Spine appears normal, range of motion is not limited, no muscle or joint tenderness. No significant reproducible chest wall tenderness but pt states " I feel it but it isn't bad" as I palpate along the right 5-8 midaxillary area of ribs.

## 2020-08-03 NOTE — ED PROVIDER NOTE - NSFOLLOWUPINSTRUCTIONS_ED_ALL_ED_FT
Follow up with your PMD within 24-48 hrs hours.      Rest, Take Tylenol 650mg every 4-6 hours as needed for pain .     Return to the Emergency department if you develop significant worsening of pain, profuse vomiting, dizziness, changes in vision, difficulty walking/speaking, weakness or numbness to your extremities.     Worsening or new concerning symptoms return to the emergency department. Follow up with your PMD within 24-48 hrs hours.      Rest, Take Tylenol 650mg every 4-6 hours as needed for pain .     Return to the Emergency department if you develop significant worsening of pain, profuse vomiting, dizziness, changes in vision, difficulty walking/speaking, weakness or numbness to your extremities.     Follow up with Dr Jose Corrales, Hand Surgeon within the week. Take antibiotics as prescribed.     Worsening or new concerning symptoms return to the emergency department. Follow up with your PMD within 24-48 hrs hours.      Rest, Take Tylenol 650mg every 4-6 hours as needed for pain .     Return to the Emergency department if you develop significant worsening of pain, profuse vomiting, dizziness, changes in vision, difficulty walking/speaking, weakness or numbness to your extremities.     There are no acute or new fractures. There is a probably old L1 transverse fracture, very small and not significant to your injuries today. See attached documents that you can share with your primary care physician.     Follow up with Dr Jose Corrales, Hand Surgeon within the week. Take antibiotics as prescribed.     Follow up with Dr Castro within 7 to 10 days.     Worsening or new concerning symptoms return to the emergency department.

## 2020-08-03 NOTE — ED PROVIDER NOTE - CARE PROVIDER_API CALL
Jose Corrales  ORTHOPAEDIC SURGERY  [address] [place] [address]  Phone: [phone]  Fax: [phone]  Follow Up Time:

## 2020-08-03 NOTE — ED ADULT NURSE NOTE - OBJECTIVE STATEMENT
Pt walked in to ED with reports of suffering a fall last night at 9pm. Pt reports that he was going up the stairs carrying an empty glass when fell backwards about 6 steps. Pt denies LOC at the time of injury, he reports his wife helped him to "patch me up" and he went to sleep. He reports he had 3 glasses of wine as he does every evening. Pt reports he got a T-dap last week because his daughter is having a baby soon.   Dry lacerations about 2cm noted to right parietal area, denies head, neck and back pain. Pt reported pain to left rib area, pain worse with inspiration and movement, denies SOB. Abdomen soft and non tender. Left pinky deformity with multiple deep lacerations and swelling noted to left hand noted. Yellow metal ring removed from left second digit and placed in pt's wallet, labeled with and returned to pt. Left knee laceration noted, cleaned and dressed.

## 2020-08-03 NOTE — ED PROVIDER NOTE - CLINICAL SUMMARY MEDICAL DECISION MAKING FREE TEXT BOX
70M presents to the ED s/p fall last night around 9pm. He says he had a few glasses of wine and while going up the steps with his glass, he lost his balance falling backward. + head injury, No LOC. His wife was present who bandaged him up. This morning, he awoke with b/l hand swelling and the bleeding from one wound continues. He also has right chest wall pain. Preceding the fall, no dizziness or chest pain. No palpitations.   He has PMH of afib, s/p ablation. He has h/o ablation. He also received his tetanus (with pertussis) because his daughter is expecting to deliver shortly. This was within the past week with PCP at the [facility]. Skin: 2cm old laceration to the right frontoparietal scalp.   Left hand 5th digit with extensive partial and full skin avulsions. There is a deep finger laceration to the fifth digit at the middle phalanx. No fb seen. Left knee with several small lacerations, one is 2cm, the others are superficial. Plan for CT brain and chest. Xray hands and knee. 70M presents to the ED s/p fall last night around 9pm. He says he had a few glasses of wine and while going up the steps with his glass, he lost his balance falling backward. + head injury, No LOC. His wife was present who bandaged him up. This morning, he awoke with b/l hand swelling and the bleeding from one wound continues. He also has right chest wall pain. Preceding the fall, no dizziness or chest pain. No palpitations.   He has PMH of afib, s/p ablation. He has h/o ablation. He also received his tetanus (with pertussis) because his daughter is expecting to deliver shortly. This was within the past week with PCP at the [facility]. Skin: 2cm old laceration to the right frontoparietal scalp.   Left hand 5th digit with extensive partial and full skin avulsions. There is a deep finger laceration to the fifth digit at the middle phalanx. No fb seen. Left knee with several small lacerations, one is 2cm, the others are superficial. Plan for CT brain and chest. Xray hands and knee.  Dr Castro called to acutely care for the laceration(s) as well as the prox phalanx fracture

## 2020-08-09 ENCOUNTER — TRANSCRIPTION ENCOUNTER (OUTPATIENT)
Age: 70
End: 2020-08-09

## 2020-09-16 ENCOUNTER — APPOINTMENT (OUTPATIENT)
Dept: ORTHOPEDIC SURGERY | Facility: CLINIC | Age: 70
End: 2020-09-16
Payer: MEDICARE

## 2020-09-16 PROCEDURE — 72100 X-RAY EXAM L-S SPINE 2/3 VWS: CPT

## 2020-09-16 PROCEDURE — 99214 OFFICE O/P EST MOD 30 MIN: CPT

## 2020-09-16 NOTE — DISCUSSION/SUMMARY
[de-identified] : Overall he is recovered well.  He will continue with postural exercises.  Follow-up in 1 year.

## 2020-09-16 NOTE — HISTORY OF PRESENT ILLNESS
[de-identified] : Mr. Daugherty is s/p L2-5 laminectomy with L3-4 fusion on 5/1/18. His preop symptoms are gone. Overall he is doing great.  No complaints.

## 2020-09-16 NOTE — PHYSICAL EXAM
[Ataxic] : not ataxic [de-identified] : Well-healed posterior cervical and lumbar incisions. Stable neurologic exam. [de-identified] : AP lateral lumbar x-rays reveal solid arthrodesis at his fusion level.  Overall spondylosis and loss of lumbar lordosis

## 2020-10-03 NOTE — H&P PST ADULT - VENOUS THROMBOEMBOLISM SCORE
See acute telephone encounter 10/2/20. From: Nolberto Barnett  To: Curtis Gil MD  Sent: 10/2/2020  8:39 PM CDT  Subject: Visit Follow-up Question    Need to see you ASAP, pain is worse and symptoms are not getting better. 8

## 2020-10-26 ENCOUNTER — APPOINTMENT (OUTPATIENT)
Dept: UROLOGY | Facility: CLINIC | Age: 70
End: 2020-10-26

## 2021-03-30 NOTE — DISCHARGE NOTE ADULT - DISCHARGE DATE
SS NOTE: NO COVID TESTING. SW met with pt today. Pt resides with his wife, Ember Rogers in a 2 story home with 13 steps to bed and bathroom and 13 steps to the basement, there are 3 steps to the entryway. Pt relates that he was I pta with adls iadls and driving. Pt relates that he has dme at home from his bilateral knee surgery in 2019. Pt has a knee scooter, walker, cane and bsc. Pt has been to [place] and used [facility] in the past. Pt's PCP is Dr Bryan Gregorio and his pharmacy is [facility]. Currently pt plans on returning home at [facility]. SS to continue. Viraj Pappas. 3/25/2021.3:18 PM.
SS NOTE: Pt to have a bx on his scalp lesion today. SW completed a [place] referral today- and they will begin [address] now. SW met with pt and his dtr yesterday. Pt to have 3-6 weeks of IV Vanco at home after dch. He is requesting [facility], however he is also concerned that he cannot walk or transfer and will want a [place] referral. Alessio Antoine. Mian. 3/30/2021.10:14AM.
SS NOTE: SW met with pt and his dtr today. Pt to have 3-6 weeks of IV Vanco at home after dch. He is requesting [facility], however he is also concerned that he cannot walk or transfer and may want a [place] referral.  PT/OT to see pt to and await their recommendations to make final dch plans. Wanettegurinder Pappas. 3/29/2021.3:50 PM.    3-29-Cm note: (  No covid  testing) SS consult noted for home care for IV abx's , I called the referral to [facility], they will see him on Wednesday, they will run the cost of the medication and call the pt with any out of pocket costs. Pt has a PICC line in place.   Electronically signed by Lurdes Culver RN on 3/29/2021 at 3:07 PM
09-[birth year]2018

## 2021-06-30 ENCOUNTER — APPOINTMENT (OUTPATIENT)
Dept: UROLOGY | Facility: CLINIC | Age: 71
End: 2021-06-30
Payer: MEDICARE

## 2021-06-30 PROCEDURE — 99213 OFFICE O/P EST LOW 20 MIN: CPT

## 2021-06-30 NOTE — ASSESSMENT
[FreeTextEntry1] : Now voiding on his own and doing well with minimal PVR. \par --Cont flomax and finasteride\par --PSA next year\par --RTC in 1y with PSA, PVR, MCKENZIE\par

## 2021-06-30 NOTE — PHYSICAL EXAM
[General Appearance - Well Developed] : well developed [General Appearance - Well Nourished] : well nourished [General Appearance - In No Acute Distress] : no acute distress [Abdomen Soft] : soft [Abdomen Tenderness] : non-tender [Costovertebral Angle Tenderness] : no ~M costovertebral angle tenderness [Skin Color & Pigmentation] : normal skin color and pigmentation [Edema] : no peripheral edema [Exaggerated Use Of Accessory Muscles For Inspiration] : no accessory muscle use [Oriented To Time, Place, And Person] : oriented to person, place, and time [FreeTextEntry1] : ambulating on own;  no

## 2021-06-30 NOTE — HISTORY OF PRESENT ILLNESS
[FreeTextEntry1] : 70yo gentleman with cc of BPH and hx of urinary retention. Pt with cervical laminectomy on 3/2018. He was in the hospital for 4 days. He felt he was urinating well. After discharge, he had progressive difficulty voiding and went to ER and had guerrero placed. He had post obstructive diuresis with electrolyte  abnormalities. MCKENZIE was benign with gland estimated at 50cc. He was started on flomax and eventually was also started on finasteride. He was taught CIC and increasingly was able to void on his own. he was able to stop CIC. He is very happy with his sx. He is voiding on his own. He has been voiding well on his own. PSA 3.57 in June 2018 and was 2.5 in 2020. At baseline, no urinary complaints. Gets up 2-3 times. No straining. No feelings of incomplete emptying. No family hx of prostate ca. \par \par He continues to do well. No issues. No new medical problems. Urinary sx stable.

## 2021-09-20 ENCOUNTER — APPOINTMENT (OUTPATIENT)
Dept: ORTHOPEDIC SURGERY | Facility: CLINIC | Age: 71
End: 2021-09-20
Payer: MEDICARE

## 2021-09-20 VITALS
HEART RATE: 54 BPM | WEIGHT: 240 LBS | SYSTOLIC BLOOD PRESSURE: 132 MMHG | HEIGHT: 66 IN | DIASTOLIC BLOOD PRESSURE: 84 MMHG | BODY MASS INDEX: 38.57 KG/M2

## 2021-09-20 DIAGNOSIS — M54.16 RADICULOPATHY, LUMBAR REGION: ICD-10-CM

## 2021-09-20 PROCEDURE — 72040 X-RAY EXAM NECK SPINE 2-3 VW: CPT

## 2021-09-20 PROCEDURE — 72100 X-RAY EXAM L-S SPINE 2/3 VWS: CPT

## 2021-09-20 PROCEDURE — 99213 OFFICE O/P EST LOW 20 MIN: CPT

## 2021-09-20 NOTE — DISCUSSION/SUMMARY
[de-identified] : Overall, he is recovered well.  Follow-up in 1 year or sooner with any changes or worsening of his symptoms.

## 2021-09-20 NOTE — PHYSICAL EXAM
[Ataxic] : not ataxic [de-identified] : Well-healed posterior cervical and lumbar incisions. Stable neurologic exam. [de-identified] : AP lateral lumbar x-rays reveal solid arthrodesis at his fusion level.  Overall spondylosis and loss of lumbar lordosis\par \par AP lateral cervical x-rays reveals instrumented posterior cervical fusion

## 2021-09-20 NOTE — HISTORY OF PRESENT ILLNESS
[de-identified] : Mr. Daugherty is s/p L2-5 laminectomy with L3-4 fusion on 5/1/18. His preop symptoms are gone. Overall he is doing great.  No complaints.  He also has a cervical fusion.

## 2021-10-24 NOTE — ED PROVIDER NOTE - PHYSICAL EXAMINATION
AAOx3  Face: +2cm deep linear laceration with clean edges, medial to right eyebrow  no active bleeding   EOMI, PERRLA
<-- Click to add NO significant Past Surgical History

## 2022-04-10 NOTE — ED PROVIDER NOTE - NEURO NEGATIVE STATEMENT, MLM
show
no loss of consciousness, no gait abnormality, no headache, no sensory deficits, and no weakness.

## 2022-05-15 ENCOUNTER — NON-APPOINTMENT (OUTPATIENT)
Age: 72
End: 2022-05-15

## 2022-05-16 ENCOUNTER — APPOINTMENT (OUTPATIENT)
Dept: ORTHOPEDIC SURGERY | Facility: CLINIC | Age: 72
End: 2022-05-16
Payer: MEDICARE

## 2022-05-16 PROCEDURE — 99214 OFFICE O/P EST MOD 30 MIN: CPT

## 2022-05-16 PROCEDURE — 72100 X-RAY EXAM L-S SPINE 2/3 VWS: CPT

## 2022-05-16 PROCEDURE — 72040 X-RAY EXAM NECK SPINE 2-3 VW: CPT

## 2022-05-20 NOTE — HISTORY OF PRESENT ILLNESS
[de-identified] : Mr. Daugherty is s/p L2-5 laminectomy with L3-4 fusion on 5/1/18. His preop symptoms are gone. Overall he is doing well.   He also has a cervical fusion.  He does feel some stiffness in his low back.

## 2022-05-20 NOTE — PHYSICAL EXAM
[Ataxic] : not ataxic [de-identified] : Well-healed posterior cervical and lumbar incisions. Stable neurologic exam. [de-identified] : AP lateral lumbar x-rays reveal solid arthrodesis at his fusion level.  Overall spondylosis and loss of lumbar lordosis\par \par AP lateral cervical x-rays reveals instrumented posterior cervical fusion

## 2022-05-20 NOTE — DISCUSSION/SUMMARY
[de-identified] : Overall, he is recovered well.  No obvious changes on his imaging.  He will try some physical therapy.  Follow-up in 3 months time or sooner with any changes or worsening of his symptoms.

## 2022-07-20 ENCOUNTER — RX RENEWAL (OUTPATIENT)
Age: 72
End: 2022-07-20

## 2022-08-22 NOTE — ED ADULT TRIAGE NOTE - BP NONINVASIVE SYSTOLIC (MM HG)
147 Detail Level: Detailed Depth Of Biopsy: dermis Was A Bandage Applied: Yes Size Of Lesion In Cm: 0 Biopsy Type: H and E Biopsy Method: Personna blade Anesthesia Type: 1% lidocaine with epinephrine Anesthesia Volume In Cc: 0.5 Hemostasis: Electrocautery and Aluminum Chloride Wound Care: Petrolatum Dressing: bandage Type Of Destruction Used: Curettage Curettage Text: The wound bed was treated with curettage after the biopsy was performed. Cryotherapy Text: The wound bed was treated with cryotherapy after the biopsy was performed. Electrodesiccation Text: The wound bed was treated with electrodesiccation after the biopsy was performed. Electrodesiccation And Curettage Text: The wound bed was treated with electrodesiccation and curettage after the biopsy was performed. Silver Nitrate Text: The wound bed was treated with silver nitrate after the biopsy was performed. Lab: [id number] Lab Facility: [id number] Notes In Note?: No Consent: Written consent was obtained and risks were reviewed including but not limited to scarring, infection, bleeding, scabbing, incomplete removal, nerve damage and allergy to anesthesia. Post-Care Instructions: I reviewed with the patient in detail post-care instructions. Patient is to keep the biopsy site dry overnight, and then apply bacitracin twice daily until healed. Patient may apply hydrogen peroxide soaks to remove any crusting. Notification Instructions: Patient will be notified of biopsy results. However, patient instructed to call the office if not contacted within 2 weeks. Billing Type: Third-Party Bill Information: Selecting Yes will display possible errors in your note based on the variables you have selected. This validation is only offered as a suggestion for you. PLEASE NOTE THAT THE VALIDATION TEXT WILL BE REMOVED WHEN YOU FINALIZE YOUR NOTE. IF YOU WANT TO FAX A PRELIMINARY NOTE YOU WILL NEED TO TOGGLE THIS TO 'NO' IF YOU DO NOT WANT IT IN YOUR FAXED NOTE.

## 2022-09-19 ENCOUNTER — APPOINTMENT (OUTPATIENT)
Dept: ORTHOPEDIC SURGERY | Facility: CLINIC | Age: 72
End: 2022-09-19

## 2022-09-19 VITALS
WEIGHT: 240 LBS | HEART RATE: 60 BPM | BODY MASS INDEX: 29.84 KG/M2 | SYSTOLIC BLOOD PRESSURE: 116 MMHG | DIASTOLIC BLOOD PRESSURE: 74 MMHG | HEIGHT: 75 IN

## 2022-09-19 PROCEDURE — 72100 X-RAY EXAM L-S SPINE 2/3 VWS: CPT

## 2022-09-19 PROCEDURE — 99214 OFFICE O/P EST MOD 30 MIN: CPT

## 2022-09-19 NOTE — HISTORY OF PRESENT ILLNESS
[de-identified] : Patient returns to the office today for re-evaluation s/p L2-5 laminectomy with L3-4 fusion on 5/1/18. He finished his PT.  He has low back tightness/soreness.  He uses Excedrin arthritis for symptom control.

## 2022-09-19 NOTE — PHYSICAL EXAM
[Ataxic] : not ataxic [de-identified] : Well-healed posterior cervical and lumbar incisions. Stable neurologic exam. [de-identified] : AP lateral lumbar x-rays reveal solid arthrodesis at his fusion level.  Overall spondylosis and loss of lumbar lordosis.  He may have some increased degenerative scoliosis.\par \par AP lateral cervical x-rays reveals instrumented posterior cervical fusion

## 2022-09-19 NOTE — DISCUSSION/SUMMARY
[de-identified] : We reviewed his imaging.  We discussed further treatment options.  Overall he is much improved compared to prior to surgery.  He has finished his physical therapy for the year.  He will be working on some home exercises.  Restrictions were reviewed.  Follow-up in 4 months time or sooner with any changes or worsening of his symptoms.

## 2022-10-16 NOTE — CONSULT NOTE ADULT - ASSESSMENT
This is a 66 y/o M with prostatic enlargement and urinary retention after spinal surgery, now in post-obstructive diuresis with secondary hyponatremia and azotemia.    - trend BMP q 6 hours, closely follow Na and Cr  - IV replacements given patient's high volume of output:  1/2:1 cc/hr of D5 0.9%NS  - encouraged PO fluid intake to replete electrolytes and fluid balance  - agree with close overnight monitoring; may need medical admission if POD persists beyond tomorrow afternoon  - would keep guerrero in place for one week and repeat BMP in the office to assess Cr if patient improves and is stable for discharge  - start flomax 0.4mg qhs  - follow up urine culture, no indication for antibiotics at this time.  - D/w Dr. Anuradha Ling  [facility]  [address] [place] [address]  [id number]
3

## 2022-10-18 ENCOUNTER — NON-APPOINTMENT (OUTPATIENT)
Age: 72
End: 2022-10-18

## 2022-10-21 ENCOUNTER — APPOINTMENT (OUTPATIENT)
Dept: UROLOGY | Facility: CLINIC | Age: 72
End: 2022-10-21

## 2022-10-21 PROCEDURE — 99214 OFFICE O/P EST MOD 30 MIN: CPT

## 2022-10-21 NOTE — ASSESSMENT
[FreeTextEntry1] : Now voiding on his own and doing well with minimal PVR. \par --Cont flomax and finasteride. renewed today\par --PSA\par --RTC in 1y with PSA, PVR, MCKENZIE\par

## 2022-10-21 NOTE — HISTORY OF PRESENT ILLNESS
[FreeTextEntry1] : 73yo gentleman with cc of BPH and hx of urinary retention. Pt with cervical laminectomy on 3/2018. He was in the hospital for 4 days. He felt he was urinating well. After discharge, he had progressive difficulty voiding and went to ER and had guerrero placed. He had post obstructive diuresis with electrolyte  abnormalities. MCKENZIE was benign with gland estimated at 50cc. He was started on flomax and eventually was also started on finasteride. He was taught CIC and increasingly was able to void on his own. he was able to stop CIC. He is very happy with his sx. He is voiding on his own. He has been voiding well on his own. PSA 3.57 in June 2018 and was 2.5 in 2020. At baseline, no urinary complaints. Gets up 2-3 times. No straining. No feelings of incomplete emptying. No family hx of prostate ca. \par \par He continues to do well. No issues. No new medical problems. He is still dealing back issues and is doing PT. He is taking excedrin arthritis every am. Urinary sx stable.

## 2022-11-11 LAB
PSA FREE FLD-MCNC: 32 %
PSA FREE SERPL-MCNC: 0.8 NG/ML
PSA SERPL-MCNC: 2.5 NG/ML

## 2022-12-29 ENCOUNTER — OUTPATIENT (OUTPATIENT)
Dept: OUTPATIENT SERVICES | Facility: HOSPITAL | Age: 72
LOS: 1 days | End: 2022-12-29
Payer: MEDICARE

## 2022-12-29 ENCOUNTER — APPOINTMENT (OUTPATIENT)
Dept: RADIOLOGY | Facility: HOSPITAL | Age: 72
End: 2022-12-29

## 2022-12-29 DIAGNOSIS — Z98.890 OTHER SPECIFIED POSTPROCEDURAL STATES: Chronic | ICD-10-CM

## 2022-12-29 DIAGNOSIS — M25.562 PAIN IN LEFT KNEE: ICD-10-CM

## 2022-12-29 DIAGNOSIS — Z98.1 ARTHRODESIS STATUS: Chronic | ICD-10-CM

## 2022-12-29 PROCEDURE — 73562 X-RAY EXAM OF KNEE 3: CPT

## 2022-12-29 PROCEDURE — 73562 X-RAY EXAM OF KNEE 3: CPT | Mod: 26,LT

## 2023-01-04 ENCOUNTER — APPOINTMENT (OUTPATIENT)
Dept: ORTHOPEDIC SURGERY | Facility: CLINIC | Age: 73
End: 2023-01-04
Payer: MEDICARE

## 2023-01-04 VITALS
DIASTOLIC BLOOD PRESSURE: 82 MMHG | WEIGHT: 240 LBS | SYSTOLIC BLOOD PRESSURE: 157 MMHG | HEIGHT: 77 IN | BODY MASS INDEX: 28.34 KG/M2

## 2023-01-04 DIAGNOSIS — M41.9 SCOLIOSIS, UNSPECIFIED: ICD-10-CM

## 2023-01-04 DIAGNOSIS — M48.07 SPINAL STENOSIS, LUMBOSACRAL REGION: ICD-10-CM

## 2023-01-04 PROCEDURE — 72100 X-RAY EXAM L-S SPINE 2/3 VWS: CPT

## 2023-01-04 PROCEDURE — 72040 X-RAY EXAM NECK SPINE 2-3 VW: CPT

## 2023-01-04 PROCEDURE — 99214 OFFICE O/P EST MOD 30 MIN: CPT

## 2023-01-04 NOTE — PHYSICAL EXAM
[Ataxic] : not ataxic [de-identified] : Well-healed posterior cervical and lumbar incisions. Stable neurologic exam. [de-identified] : AP lateral lumbar x-rays reveal solid arthrodesis at his fusion level.  Overall spondylosis and loss of lumbar lordosis.  Degenerative scoliosis.\par \par AP lateral cervical x-rays reveals instrumented posterior cervical fusion.  He appears to have a solid arthrodesis.

## 2023-01-04 NOTE — DISCUSSION/SUMMARY
[de-identified] : We discussed further treatment options.  Overall he is doing well with therapy.  He would like to continue with this.  Follow-up in 6 months time or sooner with any changes or worsening of his symptoms.

## 2023-01-04 NOTE — HISTORY OF PRESENT ILLNESS
[de-identified] : Patient returns to the office today for re-evaluation s/p L2-5 laminectomy with L3-4 fusion on 5/1/18. He finished his PT.  He has low back tightness/soreness.  He uses Excedrin arthritis for symptom control.

## 2023-01-12 NOTE — ASU PATIENT PROFILE, ADULT - NS PREOP UNDERSTANDS INFO
yes Rhofade Counseling: Rhofade is a topical medication which can decrease superficial blood flow where applied. Side effects are uncommon and include stinging, redness and allergic reactions.

## 2023-03-21 ENCOUNTER — NON-APPOINTMENT (OUTPATIENT)
Age: 73
End: 2023-03-21

## 2023-06-23 NOTE — ED PROVIDER NOTE - PLAN OF CARE
[Post Op: _________] : a [unfilled] post op visit
Follow up with your primary doctor and urologist. Bring copies of your lab results. Rest, drink plenty of fluids.  Advance activity as tolerated.  Continue all previously prescribed medications as directed.  Follow up with your primary care physician in 48-72 hours- bring copies of your results.  Return to the ER for worsening or persistent symptoms, and/or ANY NEW OR CONCERNING SYMPTOMS. If you have issues obtaining follow up, please call: [phone] to obtain a doctor or specialist who takes your insurance in your area.  You may call [phone] to make an appointment with the internal medicine clinic.

## 2023-07-05 ENCOUNTER — APPOINTMENT (OUTPATIENT)
Dept: ORTHOPEDIC SURGERY | Facility: CLINIC | Age: 73
End: 2023-07-05
Payer: MEDICARE

## 2023-07-05 VITALS
SYSTOLIC BLOOD PRESSURE: 157 MMHG | WEIGHT: 240 LBS | OXYGEN SATURATION: 97 % | BODY MASS INDEX: 29.22 KG/M2 | DIASTOLIC BLOOD PRESSURE: 86 MMHG | HEART RATE: 70 BPM | HEIGHT: 76 IN

## 2023-07-05 DIAGNOSIS — M48.02 SPINAL STENOSIS, CERVICAL REGION: ICD-10-CM

## 2023-07-05 PROCEDURE — 99214 OFFICE O/P EST MOD 30 MIN: CPT

## 2023-07-05 NOTE — PHYSICAL EXAM
[Ataxic] : not ataxic [de-identified] : Well-healed posterior cervical and lumbar incisions. Stable neurologic exam. [de-identified] : AP lateral lumbar x-rays reveal solid arthrodesis at his fusion level.  Overall spondylosis and loss of lumbar lordosis.  Degenerative scoliosis.\par \par AP lateral cervical x-rays reveals instrumented posterior cervical fusion.  He appears to have a solid arthrodesis.

## 2023-07-05 NOTE — HISTORY OF PRESENT ILLNESS
[de-identified] : Mr. Daugherty returns to the office today for re-evaluation s/p L2-5 laminectomy with L3-4 fusion on 5/1/18. Overall, he is doing well.

## 2023-07-05 NOTE — DISCUSSION/SUMMARY
[de-identified] : We discussed further treatment options.  Overall, he is improved with therapy.  He will continue with this.  Follow-up in 6 months.

## 2023-08-04 NOTE — H&P PST ADULT - GASTROINTESTINAL DETAILS
Student's Name:   Lucy Paniagua Birth Date  2009  Sex  female  Race/Ethnicity   School/Grade Level/ID#     Address:   [address] [place] [id number]  Parent/Guardian             Telephone#                                            Home:                               Work:   IMMUNIZATIONS: To be completed by health care provider. The mo/da/yr for every dose administered is required. If a specific vaccine is medically contraindicated, a separate written statement must be attached by the health care responsible for completing the health examination explaining the medical reason for the contraindication.      Immunization History   Administered Date(s) Administered   • COVID Pfizer 12Y+ (Requires Dilution) 08/13/2021, 09/04/2021   • DTaP(INFANRIX) 2009, 01/11/2010, 03/15/2010, 11/22/2010, 03/17/2014   • HIB, Unspecified Formulation 2009, 01/11/2010, 03/15/2010, 11/22/2010   • HPV 9-Valent 08/10/2020, 02/15/2021   • Hep A, ped/adol, 2 dose 05/27/2015, 09/26/2022   • Hep B, Unspecified Formulation 2009   • Hep B, adolescent or pediatric 2009, 2009, 06/21/2010   • Influenza, Unspecified Formulation 11/08/2010, 01/03/2011   • Influenza, intranasal, quadrivalent, live (FLUMIST) 10/09/2013   • Influenza, quadrivalent, preserve-free 10/08/2014   • Influenza, seasonal, injectable, preservative free 11/10/2011   • MMR 08/16/2010, 03/17/2014   • Meningococcal Conjugate MCV4P (Menactra) 10/21/2020   • Pneumococcal Conjugate 7 Valent 2009, 01/11/2010, 03/15/2010, 08/16/2010   • Polio, INACTIVATED 2009, 01/11/2010, 03/15/2010, 03/17/2014   • Rotavirus - monovalent 01/11/2010   • Rotavirus - pentavalent 2009, 03/15/2010   • Tdap 10/21/2020   • Varicella 08/16/2010, 03/17/2014      Immunizations given 8/4/2023: None      Health care provider (MD, DO, APN, PA, school health professional, health official) verifying above immunization history must sign below. If adding dates to  the above immunization history section, put your initials by date(s) and sign here.)  Signature                                                                 Title                                                Date  _____________________________________________________________________________________  Signature                                                                 Title                                                Date  _____________________________________________________________________________________   ALTERNATIVE PROOF OF IMMUNITY   1. Clinical diagnosis (measles, mumps, hepatitis B) is allowed when verified by physician and supported with lab confirmation.  Attach copy of lab result.    * MEASLES (Rubeola)  MO   DA  YR          **MUMPS  MO   DA  YR             HEPATITIS B  MO   DA   YR           VARICELLA  MO   DA  YR      2. History of varicella (chickenpox) disease is acceptable if verified by health care provider, school health professional or health official.  Person signing below verifies that the parent/guardian’s description of varicella disease history is indicative of past infection and is accepting such history as documentation of disease.  Date of Disease                                  Signature                                                           Title   3. Laboratory Evidence of Immunity (check one)      __ Measles*         __ Mumps**        __ Rubella        __Varicella    (Attach copy of lab result)         *All measles cases diagnosed on or after July 1, 2002, must be confirmed by laboratory evidence.      **All mumps cases diagnosed on or after July 1, 2013, must be confirmed by laboratory evidence.     Completion of Alternative 1 or 3 MUST be accompanied by Labs & Physician Signature: ___________________________  Physician Statements of Immunity MUST be submitted to [facility] for review.     Certificates of [employer] Exemption to Immunizations or Physician Medical Statements  of Medical Contraindication Are Reviewed   and Maintained by the School Authority     (11/2015)                                         (COMPLETE BOTH SIDES)                                  Approved [facility] 3/2016      HEALTH HISTORY      TO BE COMPLETED AND SIGNED BY PARENT/GUARDIAN AND VERIFIED BY HEALTH CARE PROVIDER  ALLERGIES: (Food, drug, insect, other) has No Known Allergies.   MEDICATION: (List all prescribed or taken on a regular basis.) has a current medication list which includes the following prescription(s): Ferrous Sulfate 142 (45 Fe) MG Tab CR, FLUoxetine (PROzac) 20 MG capsule, docusate sodium (COLACE) 100 MG capsule, and hydroCORTisone 0.5 % cream.   Diagnosis of asthma?  Child wakes during night coughing? Yes    No  Yes    No  Loss of function of one of paired  organs?(eye/ear/kidney/testicle) Yes    No    Birth defects? Yes    No  Hospitalizations? Yes    No    Developmental delay? Yes    No   When? What for? Yes    No    Blood disorders? Hemophilia,  Sickle Cell, Other? Explain. Yes    No  Surgery? (List all.)  When? What for? Yes    No    Diabetes? Yes    No  Serious injury or illness? Yes    No    Head injury/Concussion/Passed out? Yes    No  TB skin test positive (past/present)? * Yes    No *If yes, refer to local [facility] dept   Seizures? What are they like?  Yes    No  TB disease (past or present)? * Yes    No *[facility]   Heart problem/Shortness of breath?  Yes    No  Tobacco use (type, frequency)?  Yes    No    Heart murmur/High blood pressure? Yes    No  Alcohol/Drug use?  Yes    No    Dizziness or chest pain with exercise? Yes    No  Family history of sudden death  before age 50? (Cause?) Yes    No    Eye/Vision problems? ______           __Glasses          __Contacts  Last exam by eye doctor ______  Other concerns? (crossed eye, drooping lids, squinting, difficulty reading) Dental?   __ Braces     __ Bridge     __ Plate   __Other   Ear/Hearing problems? Yes     No  Information may be shared with appropriate personnel for health and educational purposes.   Bone/Joint problem/injury/scoliosis? Yes    No  Parent/Guardian  Signature                                               Date   PHYSICAL EXAMINATION REQUIREMENTS   Entire section below to be completed by MD//RORO/PA Head Circumference if <2-3 years old - /74 (BP Location: LUE - Left upper extremity, Patient Position: Sitting, Cuff Size: Regular)   Pulse 84   Resp 18   Ht 5' (1.524 m)   Wt 73.9 kg (162 lb 14.7 oz)   LMP 07/02/2023 (Within Days)   BMI 31.82 kg/m²   BSA 1.71 m²    98 %ile (Z= 2.03) based on CDC (Girls, 2-20 Years) BMI-for-age based on BMI available as of 8/4/2023.   DIABETES SCREENING (NOT REQUIRED FOR ) BMI>85% age/sex: Yes     And any two of the following: Family History: No  Ethnic Minority: No    Signs of Insulin Resistance (hypertension, dyslipidemia, polycystic ovarian syndrome, acanthosis nigricans): No  At Risk: No   LEAD RISK QUESTIONNAIRE Required for children age 6 months through 6 years enrolled in licensed or public school operated day care, , nursery school and/or . (Blood test required if resides in [place] or high risk zip code.)  Questionnaire Administered? NA  Blood Test Indicated? NA   Blood Test Date/Result:    TB SKIN OR BLOOD TEST Recommended only for children in high-risk groups including children immunosuppressed due to HIV infection or other conditions, frequent travel to or born in high prevalence countries or those exposed to adults in high-risk categories. See CDC guidelines. http://www.cdc.gov/tb/publications/factsheets/testing/TB_testing.htm.  Test Needed: No     Test performed: No                          Skin Test:    Date Read              /      /             Result:   Positive__      Negative__        mm________                          Blood Test: Date Reported       /      /               Result:  Positive__      Negative__       Value________  LAB TESTS (recommended) Date/Result  Date/Results   Hemoglobin or Hematocrit 12.6 (10/13/2022) Sickle Cell (when indicated)    Urinalysis NA Developmental Screening Tool Normal - ASQ        SYSTEM REVIEW Normal  Comments/Follow-up/Needs  Normal Comments/Follow-up/Needs   Skin  Yes  Endocrine Yes    Ears Yes                  Screening Result Gastrointestinal Yes    Eyes Yes                  Screening Result: Genito-Urinary Yes                                      LMP: 7/2/2023   Nose Yes  Neurologic Yes    Throat Yes  Musculoskeletal Yes    Mouth/Dental Yes  Spinal Exam Yes    Cardiovascular/HTN Yes  Nutritional status Yes    Respiratory Yes Diagnosis of Asthma: No   Mental Health Yes    Currently Prescribed Asthma Medication:        No  Quick-relief medication (e.g. Short Acting Beta Antagonist)        No  Controller medication (e.g. inhaled corticosteroid) Other     NEEDS/MODIFICATIONS required in the school setting: No restrictions DIETARY Needs/Restrictions: No restrictions   SPECIAL INSTRUCTIONS/DEVICES e.g. safety glasses, glass eye, chest protector for arrhythmia, pacemaker, prosthetic device, dental bridge, false teeth, athletic support/cup: No restrictions   MENTAL HEALTH/OTHER Is there anything else the school should know about this student?  If you would like to discuss this student’s health with school or school health personnel:  Not needed   EMERGENCY ACTION needed while at school due to child’s health condition (e.g. ,seizures, asthma, insect sting, food, peanut allergy, bleeding problem, diabetes, heart problem)?   No   On the basis of the examination on this day, I approve this child’s participation in                                (If No or Modified please attach explanation.)  PHYSICAL EDUCATION:  Yes                               INTERSCHOLASTIC SPORTS   Yes   Print Name  Jacqueline Cortez DO         Signature                                                                        Date: 8/4/2023   Address:  [facility]  [address]  [facility]  [address] [facility] [address]  [facility] [address]  [phone]         (Complete Both Sides)     Approved [facility] 3/2016   nontender/soft

## 2023-09-15 ENCOUNTER — RX RENEWAL (OUTPATIENT)
Age: 73
End: 2023-09-15

## 2023-09-27 NOTE — PATIENT PROFILE ADULT. - NSCAFFEINEWITH_GEN_ALL_CORE_SD
Assumed care of patient. Vitals stable. Pt resting in bed, Aox4. C/o dizziness.      Lion Ceja RN  09/27/23 [id number] denies

## 2023-10-04 NOTE — ED ADULT TRIAGE NOTE - BP NONINVASIVE DIASTOLIC (MM HG)
10/4/2023    [address]  [facility] [address]        Clinic Note    Stormy Woo  : 1952  PCP: Sam Guillermo MD    Reason for Visit:     Chief Complaint   Patient presents with   • Follow-up       History of Present Illness:   Stormy Woo is a 71 year old woman with PMHx of HTN and hypothydroidism who I met in 2023 when patient presented to [facility] with new onset afib with RVR (HR 170s).  Started on cardizem gtt as well as lovenox subcutaneous.   Received IV lasix and patient feels improved.  Echo showed an EF of 24% with moderate-severe MR.  Cath showed no CAD.  Started on eliquis and toprol.   We had wanted to do a RUPAL/DCCV but RUPAL showed a SHARAD Clot so no DCCV was performed.  A repeat RUPAL on  continued to show a SHARAD clot (although smaller). We were eventually able to repeat a RUPAL and perform a cardioversion on 3/16/23 to sinus rhythm and patient was started on Amio.     Patient sees a pulmonolist due to prior TB and lung scar and I was asked to stop amio because of this which we did in . He presents for followup.    Repeat echocardiogram of 2023 shows EF is up to 46% after being in sinus rhythm.    She overall is doing a lot better.  A lot of her symptoms have resolved.  He does not have too much shortness of breath.  She checks her heart rate and blood pressure regularly and they seem to be well controlled.  She remains on Eliquis and denies any complaints.  She feels a lot better.  She is walking outside.  She really denies any complaints.    EKG performed office shows sinus bradycardia heart rate 53 bpm.     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  He is asking whether she can come off the amiodarone.      PMHx:     Past Medical History:   Diagnosis Date   • BRVO (branch retinal vein occlusion)    • COVID-19 vaccine series  completed 03/07/2022   • Hypertension    • Hypothyroidism    • Radial scar of breast    • Restless leg syndrome    • Sinusitis, chronic    • Thyroid disease        PSHx:     Past Surgical History:   Procedure Laterality Date   • Biopsy of breast, incisional Right 03/22/2022   • Remv 2nd cataract,corn-scler sectn     • Stereotactic breast biopsy Right 02/18/2022    radial scar       Family Hx:     Family History   Problem Relation Age of Onset   • Cancer, Skin Mother    • Patient is unaware of any medical problems Father    • Cancer, Lung Sister        Social Hx:     Social History     Tobacco Use   • Smoking status: Never   • Smokeless tobacco: Never   Vaping Use   • Vaping Use: never used   Substance Use Topics   • Alcohol use: Never   • Drug use: Never       Allergies:    ALLERGIES:  No Known Allergies    Medications:     Current Outpatient Medications   Medication Sig Dispense Refill   • metoPROLOL succinate (TOPROL-XL) 100 MG 24 hr tablet Take 1 tablet by mouth in the morning and 1 tablet in the evening. 60 tablet 0   • Eliquis 5 MG Tab TAKE 1 TABLET BY MOUTH EVERY 12 HOURS 60 tablet 3   • empagliflozin (JARDIANCE) 10 MG tablet Take 1 tablet by mouth daily (before breakfast). 30 tablet 11   • sacubitril-valsartan (ENTRESTO) 24-26 MG per tablet Take 1 tablet by mouth in the morning and 1 tablet in the evening. 180 tablet 3   • aspirin (ECOTRIN) 81 MG EC tablet Take 81 mg by mouth daily.     • fluticasone-salmeterol 250-50 MCG/DOSE inhaler Inhale 1 puff into the lungs two times daily.     • latanoprost (XALATAN) 0.005 % ophthalmic solution Place 1 drop into left eye nightly.     • levothyroxine 88 MCG tablet Take 88 mcg by mouth daily.     • albuterol 108 (90 Base) MCG/ACT inhaler Inhale 2 puffs into the lungs every 4 to 6 hours as needed.       No current facility-administered medications for this visit.       Review of Systems:   ROS Eye Problem(s):negative  ENT Problem(s):negative  Cardiovascular  problem(s):negative  Respiratory problem(s):negative  Gastro-intestinal problem(s):negative GI  Genito-urinary problem(s):negative  Musculoskeletal problem(s):negative  Integumentary problem(s):negative  Neurological problem(s):negative  Psychiatric problem(s):negative  Endocrine problem(s):negative  Hematologic and/or Lymphatic problem(s):negative      Physical Exam:   Vitals:   Visit Vitals  /77 (BP Location: RUE - Right upper extremity, Patient Position: Sitting, Cuff Size: Large Adult)   Wt 61 kg (134 lb 5.9 oz)   BMI 26.24 kg/m²      General:  No acute distress.   HEENT: Mucosa moist, no scleral icterus   Lungs: Cear to auscultation   Cardiac: JVP normal. No carotid bruit.    RRR,    No edema   Pulses:  Pedal pulses nl     Femoral pulses nl    Carotid pulses nl   Abdomen: Soft, non tender, difficult to assess abd aorta   Musculoskeletal: Gait normal. No tendon xanthoma.    Skin: Warm, no cyanosis.   Neuro: A & O   Psychiatric: Normal affect.        Labs:   No results found for: \"CHOLESTEROL\"   No results found for: \"CALCLDL\"   No results found for: \"HDL\"   No results found for: \"TRIGLYCERIDE\"     GOT/AST (Units/L)   Date Value   01/23/2023 21      No results found for: \"ALT\"    No results for input(s): \"CHOLESTEROL\", \"CALCLDL\", \"HDL\", \"TRIGLYCERIDE\", \"AST\", \"ALT\" in the last 72 hours.      Impression:         1. Paroxysmal atrial fibrillation (CMD)    2. Nonischemic cardiomyopathy (CMD)         71-year-old female with a history of hypertension as well as a tachycardia induced cardiomyopathy that since resolved and A-fib status post cardioversion presents for follow-up.    Plan:   1.  She remains in sinus rhythm.  She has not had any recurrence of A-fib despite coming off the amiodarone.  2.  I talked with the patient as well as the patient's son regarding various treatment options.  I suspect that she will have recurrence of A-fib and she did develop a significant tachycardia induced cardiomyopathy.  We  talked about trial of a different antiarrhythmic.  If her EF is back to normal we can revisit consider flecainide.  Multaq is another option although heart rate is somewhat slow today.  We also talked about an A-fib ablation procedure and continued monitoring.  At this point time patient would prefer to just continue monitoring and will deal with the A-fib should it recur.  3.  I will see her back in 6 months.      Mango Duvall MD  10/04/23   76

## 2023-12-06 ENCOUNTER — OUTPATIENT (OUTPATIENT)
Dept: OUTPATIENT SERVICES | Facility: HOSPITAL | Age: 73
LOS: 1 days | End: 2023-12-06
Payer: MEDICARE

## 2023-12-06 ENCOUNTER — APPOINTMENT (OUTPATIENT)
Dept: RADIOLOGY | Facility: HOSPITAL | Age: 73
End: 2023-12-06
Payer: MEDICARE

## 2023-12-06 DIAGNOSIS — Z00.8 ENCOUNTER FOR OTHER GENERAL EXAMINATION: ICD-10-CM

## 2023-12-06 DIAGNOSIS — Z98.1 ARTHRODESIS STATUS: Chronic | ICD-10-CM

## 2023-12-06 DIAGNOSIS — Z98.890 OTHER SPECIFIED POSTPROCEDURAL STATES: Chronic | ICD-10-CM

## 2023-12-06 PROCEDURE — 71046 X-RAY EXAM CHEST 2 VIEWS: CPT | Mod: 26

## 2023-12-06 PROCEDURE — 71046 X-RAY EXAM CHEST 2 VIEWS: CPT

## 2023-12-11 NOTE — H&P ADULT - PROBLEM SELECTOR PROBLEM 5
Daily Prednisone taper dates:  December 9: Take 6 tablets (60 mg)   December 10 & 11: 4 tablets (40 mg)   December 12 - 18: 2 tablets (20 mg)   December 19 - 25: 1.5 tablets (15 mg)   December 26 - January 1: 1 tablet (10 mg)  January 2 - January 8:  0.5 tablets (5 mg)      Valcyte at pharmacy and add two tablets to the morning slot of medication box for Thursday, Friday and Saturday.  2. Labs to be drawn on Thursday. Your coordinator will let you know if they should be drawn at the [facility] lab or through homecare (it will be dependent on your tacrolimus level from today).    Cervical stenosis of spine

## 2023-12-18 ENCOUNTER — OUTPATIENT (OUTPATIENT)
Dept: OUTPATIENT SERVICES | Facility: HOSPITAL | Age: 73
LOS: 1 days | End: 2023-12-18
Payer: MEDICARE

## 2023-12-18 ENCOUNTER — APPOINTMENT (OUTPATIENT)
Dept: RADIOLOGY | Facility: HOSPITAL | Age: 73
End: 2023-12-18
Payer: MEDICARE

## 2023-12-18 DIAGNOSIS — R05.9 COUGH, UNSPECIFIED: ICD-10-CM

## 2023-12-18 DIAGNOSIS — Z98.1 ARTHRODESIS STATUS: Chronic | ICD-10-CM

## 2023-12-18 DIAGNOSIS — Z98.890 OTHER SPECIFIED POSTPROCEDURAL STATES: Chronic | ICD-10-CM

## 2023-12-18 PROCEDURE — 71046 X-RAY EXAM CHEST 2 VIEWS: CPT

## 2023-12-18 PROCEDURE — 71046 X-RAY EXAM CHEST 2 VIEWS: CPT | Mod: 26

## 2024-01-10 ENCOUNTER — APPOINTMENT (OUTPATIENT)
Dept: ORTHOPEDIC SURGERY | Facility: CLINIC | Age: 74
End: 2024-01-10
Payer: MEDICARE

## 2024-01-10 VITALS — WEIGHT: 240 LBS | HEIGHT: 77 IN | BODY MASS INDEX: 28.34 KG/M2

## 2024-01-10 DIAGNOSIS — M43.16 SPONDYLOLISTHESIS, LUMBAR REGION: ICD-10-CM

## 2024-01-10 PROCEDURE — 72100 X-RAY EXAM L-S SPINE 2/3 VWS: CPT

## 2024-01-10 PROCEDURE — 99214 OFFICE O/P EST MOD 30 MIN: CPT

## 2024-01-10 NOTE — HISTORY OF PRESENT ILLNESS
[de-identified] : Mr. Daugherty returns to the office today for re-evaluation s/p L2-5 laminectomy with L3-4 fusion on 5/1/18.  Overall, he is doing well.  He has intermittent low back pain.

## 2024-01-10 NOTE — DISCUSSION/SUMMARY
[de-identified] : Overall, he is doing well.  Restrictions were reviewed.  Follow-up in 6 months to 1 years time.  He will continue with some additional physical therapy.

## 2024-01-10 NOTE — PHYSICAL EXAM
[Ataxic] : not ataxic [de-identified] : Well-healed posterior cervical and lumbar incisions. Stable neurologic exam. [de-identified] : AP lateral lumbar x-rays reveal solid arthrodesis at his fusion level.  Overall spondylosis and loss of lumbar lordosis.  Degenerative scoliosis.\par  \par  AP lateral cervical x-rays reveals instrumented posterior cervical fusion.  He appears to have a solid arthrodesis.

## 2024-02-02 NOTE — PATIENT PROFILE ADULT. - PAIN, FACTORS THAT RELIEVE, PROFILE
Faxed orders for albuerol, arformoterol, budesonide and yupelri to [facility] w/ insurance and printed Rx and referral form [phone]  
rest

## 2024-02-28 ENCOUNTER — RX RENEWAL (OUTPATIENT)
Age: 74
End: 2024-02-28

## 2024-05-04 ENCOUNTER — NON-APPOINTMENT (OUTPATIENT)
Age: 74
End: 2024-05-04

## 2024-05-21 ENCOUNTER — RX RENEWAL (OUTPATIENT)
Age: 74
End: 2024-05-21

## 2024-05-22 ENCOUNTER — NON-APPOINTMENT (OUTPATIENT)
Age: 74
End: 2024-05-22

## 2024-06-01 ENCOUNTER — EMERGENCY (EMERGENCY)
Facility: HOSPITAL | Age: 74
LOS: 1 days | Discharge: HOME | End: 2024-06-01
Attending: EMERGENCY MEDICINE | Admitting: EMERGENCY MEDICINE
Payer: MEDICARE

## 2024-06-01 VITALS
RESPIRATION RATE: 18 BRPM | SYSTOLIC BLOOD PRESSURE: 133 MMHG | DIASTOLIC BLOOD PRESSURE: 63 MMHG | TEMPERATURE: 98 F | HEART RATE: 58 BPM | OXYGEN SATURATION: 98 %

## 2024-06-01 VITALS
DIASTOLIC BLOOD PRESSURE: 83 MMHG | SYSTOLIC BLOOD PRESSURE: 155 MMHG | TEMPERATURE: 97 F | RESPIRATION RATE: 18 BRPM | OXYGEN SATURATION: 98 % | HEIGHT: 76 IN | HEART RATE: 59 BPM | WEIGHT: 235.01 LBS

## 2024-06-01 DIAGNOSIS — Z98.890 OTHER SPECIFIED POSTPROCEDURAL STATES: Chronic | ICD-10-CM

## 2024-06-01 DIAGNOSIS — Z98.1 ARTHRODESIS STATUS: Chronic | ICD-10-CM

## 2024-06-01 LAB
ALBUMIN SERPL ELPH-MCNC: 3.6 G/DL — SIGNIFICANT CHANGE UP (ref 3.3–5)
ALP SERPL-CCNC: 86 U/L — SIGNIFICANT CHANGE UP (ref 40–120)
ALT FLD-CCNC: 22 U/L — SIGNIFICANT CHANGE UP (ref 10–45)
ANION GAP SERPL CALC-SCNC: 11 MMOL/L — SIGNIFICANT CHANGE UP (ref 5–17)
APTT BLD: 31.7 SEC — SIGNIFICANT CHANGE UP (ref 24.5–35.6)
AST SERPL-CCNC: 29 U/L — SIGNIFICANT CHANGE UP (ref 10–40)
BASOPHILS # BLD AUTO: 0.02 K/UL — SIGNIFICANT CHANGE UP (ref 0–0.2)
BASOPHILS NFR BLD AUTO: 0.4 % — SIGNIFICANT CHANGE UP (ref 0–2)
BILIRUB SERPL-MCNC: 0.6 MG/DL — SIGNIFICANT CHANGE UP (ref 0.2–1.2)
BUN SERPL-MCNC: 12 MG/DL — SIGNIFICANT CHANGE UP (ref 7–23)
CALCIUM SERPL-MCNC: 8.9 MG/DL — SIGNIFICANT CHANGE UP (ref 8.4–10.5)
CHLORIDE SERPL-SCNC: 99 MMOL/L — SIGNIFICANT CHANGE UP (ref 96–108)
CO2 SERPL-SCNC: 23 MMOL/L — SIGNIFICANT CHANGE UP (ref 22–31)
CREAT SERPL-MCNC: 0.99 MG/DL — SIGNIFICANT CHANGE UP (ref 0.5–1.3)
EGFR: 80 ML/MIN/1.73M2 — SIGNIFICANT CHANGE UP
EOSINOPHIL # BLD AUTO: 0.13 K/UL — SIGNIFICANT CHANGE UP (ref 0–0.5)
EOSINOPHIL NFR BLD AUTO: 2.3 % — SIGNIFICANT CHANGE UP (ref 0–6)
GLUCOSE SERPL-MCNC: 93 MG/DL — SIGNIFICANT CHANGE UP (ref 70–99)
HCT VFR BLD CALC: 36.6 % — LOW (ref 39–50)
HGB BLD-MCNC: 12.9 G/DL — LOW (ref 13–17)
IMM GRANULOCYTES NFR BLD AUTO: 0.5 % — SIGNIFICANT CHANGE UP (ref 0–0.9)
INR BLD: 1.09 RATIO — SIGNIFICANT CHANGE UP (ref 0.85–1.18)
LIDOCAIN IGE QN: 32 U/L — SIGNIFICANT CHANGE UP (ref 16–77)
LYMPHOCYTES # BLD AUTO: 0.89 K/UL — LOW (ref 1–3.3)
LYMPHOCYTES # BLD AUTO: 15.9 % — SIGNIFICANT CHANGE UP (ref 13–44)
MCHC RBC-ENTMCNC: 34.3 PG — HIGH (ref 27–34)
MCHC RBC-ENTMCNC: 35.2 GM/DL — SIGNIFICANT CHANGE UP (ref 32–36)
MCV RBC AUTO: 97.3 FL — SIGNIFICANT CHANGE UP (ref 80–100)
MONOCYTES # BLD AUTO: 0.78 K/UL — SIGNIFICANT CHANGE UP (ref 0–0.9)
MONOCYTES NFR BLD AUTO: 13.9 % — SIGNIFICANT CHANGE UP (ref 2–14)
NEUTROPHILS # BLD AUTO: 3.76 K/UL — SIGNIFICANT CHANGE UP (ref 1.8–7.4)
NEUTROPHILS NFR BLD AUTO: 67 % — SIGNIFICANT CHANGE UP (ref 43–77)
NRBC # BLD: 0 /100 WBCS — SIGNIFICANT CHANGE UP (ref 0–0)
NT-PROBNP SERPL-SCNC: 150 PG/ML — SIGNIFICANT CHANGE UP (ref 0–300)
PLATELET # BLD AUTO: 193 K/UL — SIGNIFICANT CHANGE UP (ref 150–400)
POTASSIUM SERPL-MCNC: 4.2 MMOL/L — SIGNIFICANT CHANGE UP (ref 3.5–5.3)
POTASSIUM SERPL-SCNC: 4.2 MMOL/L — SIGNIFICANT CHANGE UP (ref 3.5–5.3)
PROT SERPL-MCNC: 7 G/DL — SIGNIFICANT CHANGE UP (ref 6–8.3)
PROTHROM AB SERPL-ACNC: 12.4 SEC — SIGNIFICANT CHANGE UP (ref 9.5–13)
RBC # BLD: 3.76 M/UL — LOW (ref 4.2–5.8)
RBC # FLD: 13 % — SIGNIFICANT CHANGE UP (ref 10.3–14.5)
SODIUM SERPL-SCNC: 133 MMOL/L — LOW (ref 135–145)
TROPONIN I, HIGH SENSITIVITY RESULT: 4 NG/L — SIGNIFICANT CHANGE UP
WBC # BLD: 5.61 K/UL — SIGNIFICANT CHANGE UP (ref 3.8–10.5)
WBC # FLD AUTO: 5.61 K/UL — SIGNIFICANT CHANGE UP (ref 3.8–10.5)

## 2024-06-01 PROCEDURE — 73060 X-RAY EXAM OF HUMERUS: CPT

## 2024-06-01 PROCEDURE — 84484 ASSAY OF TROPONIN QUANT: CPT

## 2024-06-01 PROCEDURE — 73030 X-RAY EXAM OF SHOULDER: CPT | Mod: 26,RT

## 2024-06-01 PROCEDURE — 85610 PROTHROMBIN TIME: CPT

## 2024-06-01 PROCEDURE — 12005 RPR S/N/A/GEN/TRK12.6-20.0CM: CPT

## 2024-06-01 PROCEDURE — 36415 COLL VENOUS BLD VENIPUNCTURE: CPT

## 2024-06-01 PROCEDURE — 93005 ELECTROCARDIOGRAM TRACING: CPT

## 2024-06-01 PROCEDURE — 85025 COMPLETE CBC W/AUTO DIFF WBC: CPT

## 2024-06-01 PROCEDURE — 73030 X-RAY EXAM OF SHOULDER: CPT

## 2024-06-01 PROCEDURE — 80053 COMPREHEN METABOLIC PANEL: CPT

## 2024-06-01 PROCEDURE — 99285 EMERGENCY DEPT VISIT HI MDM: CPT | Mod: 25

## 2024-06-01 PROCEDURE — 85730 THROMBOPLASTIN TIME PARTIAL: CPT

## 2024-06-01 PROCEDURE — 83690 ASSAY OF LIPASE: CPT

## 2024-06-01 PROCEDURE — 99284 EMERGENCY DEPT VISIT MOD MDM: CPT | Mod: 25

## 2024-06-01 PROCEDURE — 73060 X-RAY EXAM OF HUMERUS: CPT | Mod: 26,RT

## 2024-06-01 PROCEDURE — 90471 IMMUNIZATION ADMIN: CPT

## 2024-06-01 PROCEDURE — 83880 ASSAY OF NATRIURETIC PEPTIDE: CPT

## 2024-06-01 PROCEDURE — 70450 CT HEAD/BRAIN W/O DYE: CPT | Mod: 26,MC

## 2024-06-01 PROCEDURE — 70450 CT HEAD/BRAIN W/O DYE: CPT | Mod: MC

## 2024-06-01 PROCEDURE — 90715 TDAP VACCINE 7 YRS/> IM: CPT

## 2024-06-01 PROCEDURE — 93010 ELECTROCARDIOGRAM REPORT: CPT

## 2024-06-01 RX ORDER — LIDOCAINE HYDROCHLORIDE AND EPINEPHRINE 10; 10 MG/ML; UG/ML
5 INJECTION, SOLUTION INFILTRATION; PERINEURAL ONCE
Refills: 0 | Status: COMPLETED | OUTPATIENT
Start: 2024-06-01 | End: 2024-06-01

## 2024-06-01 RX ORDER — TETANUS TOXOID, REDUCED DIPHTHERIA TOXOID AND ACELLULAR PERTUSSIS VACCINE, ADSORBED 5; 2.5; 8; 8; 2.5 [IU]/.5ML; [IU]/.5ML; UG/.5ML; UG/.5ML; UG/.5ML
0.5 SUSPENSION INTRAMUSCULAR ONCE
Refills: 0 | Status: COMPLETED | OUTPATIENT
Start: 2024-06-01 | End: 2024-06-01

## 2024-06-01 RX ADMIN — LIDOCAINE HYDROCHLORIDE AND EPINEPHRINE 5 MILLILITER(S): 10; 10 INJECTION, SOLUTION INFILTRATION; PERINEURAL at 08:35

## 2024-06-01 RX ADMIN — TETANUS TOXOID, REDUCED DIPHTHERIA TOXOID AND ACELLULAR PERTUSSIS VACCINE, ADSORBED 0.5 MILLILITER(S): 5; 2.5; 8; 8; 2.5 SUSPENSION INTRAMUSCULAR at 08:31

## 2024-06-01 NOTE — ED PROVIDER NOTE - NSFOLLOWUPINSTRUCTIONS_ED_ALL_ED_FT
please follow-up with your physician in 10 to 14 days to remove the staples     return to the nearest emergency department immediately if any new/worsening symptoms

## 2024-06-01 NOTE — ED PROVIDER NOTE - OBJECTIVE STATEMENT
74-year-old male with scalp laceration and right shoulder pain status post fall.  Patient states that he woke up around 6 AM today was walking back from the bathroom and felt dizzy, and fell face forward.  His head hit a stand,  and his right shoulder hit the ground.  Denies LOC, NVD, chest/abdominal/back/neck, focal weakness/numbness.  Denies shortness of breath, headache.  Denies any other symptoms.

## 2024-06-01 NOTE — ED ADULT TRIAGE NOTE - CHIEF COMPLAINT QUOTE
Pt felt dizzy this morning after using bathroom and fell. Pt with laceration to head and c/o right shoulder pain. Denies LOC. Takes 81mg ASA daily.

## 2024-06-01 NOTE — ED PROVIDER NOTE - CONSTITUTIONAL, MLM
acutely ill appearing, awake, alert, oriented to person, place, time/situation and in no apparent distress. normal...

## 2024-06-01 NOTE — ED ADULT NURSE NOTE - NSFALLUNIVINTERV_ED_ALL_ED
Bed/Stretcher in lowest position, wheels locked, appropriate side rails in place/Call bell, personal items and telephone in reach/Instruct patient to call for assistance before getting out of bed/chair/stretcher/Non-slip footwear applied when patient is off stretcher/[place] to call system/Physically safe environment - no spills, clutter or unnecessary equipment/Purposeful proactive rounding/Room/bathroom lighting operational, light cord in reach

## 2024-06-01 NOTE — ED PROVIDER NOTE - PATIENT PORTAL LINK FT
You can access the FollowMyHealth Patient Portal offered by [facility] by registering at the following website: [email or web address]. By joining [facility] FollowMyHealth portal, you will also be able to view your health information using other applications (apps) compatible with our system.

## 2024-06-01 NOTE — ED PROVIDER NOTE - NSICDXPASTMEDICALHX_GEN_ALL_CORE_FT
PAST MEDICAL HISTORY:  Atrial fibrillation     Cervical stenosis of spine     Hyperlipidemia     Hypertension     Lumbar herniated disc     Pinched nerve     Sciatica of left side     Spinal stenosis of lumbar region

## 2024-06-01 NOTE — ED ADULT TRIAGE NOTE - ARRIVAL FROM
Tolerated injection well. Reviewed therapy plan, offered education material and/or discharge material, reviewed medication information and signs and symptoms  and educated on possible side effects, verbalizes good knowledge of current plan patient verbalizes understanding, and has no signs or symptoms to report at this time. Patient discharged. Patient alert and oriented x3. No distress noted. Vital signs stable. Patient denies any new or worsening pain. Patient denies any needs. All questions answered. Next appointment scheduled. DECLINES copy of AVS. Patient did not stay the physician ordered wait after Nucala injection, instructed on importance of staying and patient declines .  Asthma Control Test faxed   PT  REQUESTED  NOT FILL OUT  AT THIS TIME Home

## 2024-06-01 NOTE — ED PROVIDER NOTE - CLINICAL SUMMARY MEDICAL DECISION MAKING FREE TEXT BOX
74-year-old male with  fall this morning.  Labs reviewed -  mild hyponatremia noted, patient is asymptomatic.  Otherwise acceptable.  CT showed no acute fracture–hemorrhage, x-ray shoulder/humerus showed no acute fracture.  Laceration repaired, placed in arm sling

## 2024-06-01 NOTE — ED PROVIDER NOTE - NSICDXPASTSURGICALHX_GEN_ALL_CORE_FT
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

## 2024-06-11 NOTE — CHART NOTE - NSCHARTNOTEFT_GEN_A_CORE
74 y o male presenting to the ED on 6/1/24 complaining of fall as per chart.  SW made a follow up call to assist with scheduling the recommended primary care appointment and received no answer.  Contact information was provided via voicemail.

## 2024-06-19 ENCOUNTER — NON-APPOINTMENT (OUTPATIENT)
Age: 74
End: 2024-06-19

## 2024-06-20 ENCOUNTER — APPOINTMENT (OUTPATIENT)
Dept: UROLOGY | Facility: CLINIC | Age: 74
End: 2024-06-20
Payer: MEDICARE

## 2024-06-20 VITALS
RESPIRATION RATE: 17 BRPM | WEIGHT: 235 LBS | SYSTOLIC BLOOD PRESSURE: 142 MMHG | HEART RATE: 57 BPM | TEMPERATURE: 98.3 F | BODY MASS INDEX: 27.75 KG/M2 | DIASTOLIC BLOOD PRESSURE: 83 MMHG | HEIGHT: 77 IN

## 2024-06-20 DIAGNOSIS — N40.1 BENIGN PROSTATIC HYPERPLASIA WITH LOWER URINARY TRACT SYMPMS: ICD-10-CM

## 2024-06-20 DIAGNOSIS — R33.9 RETENTION OF URINE, UNSPECIFIED: ICD-10-CM

## 2024-06-20 DIAGNOSIS — Z87.898 PERSONAL HISTORY OF OTHER SPECIFIED CONDITIONS: ICD-10-CM

## 2024-06-20 DIAGNOSIS — N13.8 BENIGN PROSTATIC HYPERPLASIA WITH LOWER URINARY TRACT SYMPMS: ICD-10-CM

## 2024-06-20 DIAGNOSIS — Z12.5 ENCOUNTER FOR SCREENING FOR MALIGNANT NEOPLASM OF PROSTATE: ICD-10-CM

## 2024-06-20 PROCEDURE — G2211 COMPLEX E/M VISIT ADD ON: CPT

## 2024-06-20 PROCEDURE — 99214 OFFICE O/P EST MOD 30 MIN: CPT

## 2024-06-20 PROCEDURE — 51798 US URINE CAPACITY MEASURE: CPT

## 2024-06-20 RX ORDER — TAMSULOSIN HYDROCHLORIDE 0.4 MG/1
0.4 CAPSULE ORAL
Qty: 180 | Refills: 3 | Status: COMPLETED | COMMUNITY
Start: 2018-06-29 | End: 2024-06-20

## 2024-06-20 RX ORDER — DUTASTERIDE 0.5 MG/1
0.5 CAPSULE, LIQUID FILLED ORAL
Qty: 90 | Refills: 3 | Status: COMPLETED | COMMUNITY
Start: 2023-03-21 | End: 2024-06-20

## 2024-06-20 RX ORDER — ALFUZOSIN HYDROCHLORIDE 10 MG/1
10 TABLET, EXTENDED RELEASE ORAL
Qty: 90 | Refills: 3 | Status: ACTIVE | COMMUNITY
Start: 2024-06-20 | End: 1900-01-01

## 2024-06-20 RX ORDER — FINASTERIDE 5 MG/1
5 TABLET, FILM COATED ORAL
Qty: 90 | Refills: 3 | Status: ACTIVE | COMMUNITY
Start: 2018-04-11 | End: 1900-01-01

## 2024-06-20 NOTE — ASSESSMENT
[FreeTextEntry1] : incomplete bladder emptying / BPH given recent hx of fall, we will switch from Tamsulosin 0.4 mg qhs -->  Alfuzosin 10 mg   cont Finasteride  Check PSA today, UA   RTO 3 months to repeat PVR if symptoms worsen or pvr increases, we discussed cystoscopy as next step

## 2024-06-20 NOTE — HISTORY OF PRESENT ILLNESS
[FreeTextEntry1] : 75yo gentleman with cc of BPH and hx of urinary retention, currently on Proscar 5mg and Tamsulosin 0.4mg. Pt with cervical laminectomy on 3/2018 with complication of urinary retention requiring guerrero. Recently had a fall, went to ER at [place] - had 14 staples on the right forehead.  Pt was a previous patient of Dr Chelsi Ling.  Patient reports of increasing nocturia 3x.   Acceptable daytime frequency. Denies of urinary leakage. Do not strain to void feels emptied post void  Denies constipation  Denies of dysuria and hematuria   1 cup of regular coffee no tea 17oz x 3 bottles of water   Never smoker   PVR: 85cc

## 2024-06-24 DIAGNOSIS — N39.0 URINARY TRACT INFECTION, SITE NOT SPECIFIED: ICD-10-CM

## 2024-06-24 LAB
APPEARANCE: CLEAR
BACTERIA UR CULT: ABNORMAL
BACTERIA: NEGATIVE /HPF
BILIRUBIN URINE: NEGATIVE
BLOOD URINE: NEGATIVE
CAST: 13 /LPF
COLOR: YELLOW
EPITHELIAL CELLS: 2 /HPF
GLUCOSE QUALITATIVE U: NEGATIVE MG/DL
HYALINE CASTS: PRESENT
KETONES URINE: NEGATIVE MG/DL
LEUKOCYTE ESTERASE URINE: ABNORMAL
MICROSCOPIC-UA: NORMAL
MUCUS: PRESENT
NITRITE URINE: NEGATIVE
PH URINE: 6.5
PROTEIN URINE: NORMAL MG/DL
PSA FREE FLD-MCNC: 20 %
PSA FREE SERPL-MCNC: 0.63 NG/ML
PSA SERPL-MCNC: 3.13 NG/ML
RED BLOOD CELLS URINE: NORMAL /HPF
REVIEW: NORMAL
SPECIFIC GRAVITY URINE: 1.02
UROBILINOGEN URINE: 1 MG/DL
WHITE BLOOD CELLS URINE: 10 /HPF

## 2024-06-24 RX ORDER — CIPROFLOXACIN HYDROCHLORIDE 250 MG/1
250 TABLET, FILM COATED ORAL
Qty: 3 | Refills: 0 | Status: ACTIVE | COMMUNITY
Start: 2024-06-24 | End: 1900-01-01

## 2024-06-24 RX ORDER — FOSFOMYCIN TROMETHAMINE 3 G/1
3 POWDER ORAL
Qty: 1 | Refills: 0 | Status: ACTIVE | COMMUNITY
Start: 2024-06-24 | End: 1900-01-01

## 2024-06-28 ENCOUNTER — APPOINTMENT (OUTPATIENT)
Dept: CARDIOLOGY | Facility: CLINIC | Age: 74
End: 2024-06-28

## 2024-07-01 ENCOUNTER — LABORATORY RESULT (OUTPATIENT)
Age: 74
End: 2024-07-01

## 2024-07-02 LAB
APPEARANCE: CLEAR
BILIRUBIN URINE: NEGATIVE
BLOOD URINE: NEGATIVE
COLOR: NORMAL
GLUCOSE QUALITATIVE U: NEGATIVE MG/DL
KETONES URINE: ABNORMAL MG/DL
LEUKOCYTE ESTERASE URINE: ABNORMAL
NITRITE URINE: POSITIVE
PH URINE: 6
PROTEIN URINE: NORMAL MG/DL
SPECIFIC GRAVITY URINE: 1.02
UROBILINOGEN URINE: 0.2 MG/DL

## 2024-07-08 DIAGNOSIS — N39.0 URINARY TRACT INFECTION, SITE NOT SPECIFIED: ICD-10-CM

## 2024-07-22 LAB
APPEARANCE: CLEAR
BACTERIA: NEGATIVE /HPF
BILIRUBIN URINE: ABNORMAL
BLOOD URINE: NEGATIVE
CAST: 7 /LPF
COARSE GRANULAR CASTS: PRESENT
COLOR: NORMAL
EPITHELIAL CELLS: 2 /HPF
GLUCOSE QUALITATIVE U: NEGATIVE MG/DL
HYALINE CASTS: PRESENT
KETONES URINE: ABNORMAL MG/DL
LEUKOCYTE ESTERASE URINE: ABNORMAL
MICROSCOPIC-UA: NORMAL
NITRITE URINE: NEGATIVE
PH URINE: 6
PROTEIN URINE: NORMAL MG/DL
RED BLOOD CELLS URINE: 1 /HPF
REVIEW: NORMAL
SPECIFIC GRAVITY URINE: 1.02
UROBILINOGEN URINE: 1 MG/DL
WHITE BLOOD CELLS URINE: 6 /HPF

## 2024-07-23 LAB — BACTERIA UR CULT: ABNORMAL

## 2024-08-01 ENCOUNTER — APPOINTMENT (OUTPATIENT)
Dept: INFECTIOUS DISEASE | Facility: CLINIC | Age: 74
End: 2024-08-01

## 2024-08-01 VITALS
WEIGHT: 149 LBS | SYSTOLIC BLOOD PRESSURE: 135 MMHG | HEIGHT: 77 IN | TEMPERATURE: 97.8 F | HEART RATE: 65 BPM | DIASTOLIC BLOOD PRESSURE: 80 MMHG | BODY MASS INDEX: 17.59 KG/M2 | OXYGEN SATURATION: 96 %

## 2024-08-01 DIAGNOSIS — R82.71 BACTERIURIA: ICD-10-CM

## 2024-08-01 DIAGNOSIS — Z22.39 CARRIER OF OTHER SPECIFIED BACTERIAL DISEASES: ICD-10-CM

## 2024-08-01 DIAGNOSIS — I48.0 PAROXYSMAL ATRIAL FIBRILLATION: ICD-10-CM

## 2024-08-01 DIAGNOSIS — E78.5 HYPERLIPIDEMIA, UNSPECIFIED: ICD-10-CM

## 2024-08-01 PROCEDURE — 99203 OFFICE O/P NEW LOW 30 MIN: CPT

## 2024-08-01 RX ORDER — ASPIRIN 81 MG/1
81 TABLET, COATED ORAL
Refills: 0 | Status: ACTIVE | COMMUNITY
Start: 2024-08-01

## 2024-08-01 NOTE — HISTORY OF PRESENT ILLNESS
[FreeTextEntry1] : 74M with Afib s/p cardioversion, HLD, cervical laminectomy, lumbar laminectomy who went to his urologist for BPH check up who sent a urine culture (routine not because he was having symptoms)  Dr Bernardino Cruz cardiology at [facility] [phone] Dr Mcgowan gave cipro but pharmacy put a hold since he is on Multaq and then they called the cardiologist who said not to take the cipro  he took 1 dose then had another urine cx which showed pseudomonas so was given fosfomycin x3 doses and urine culture still positive for pseudomonas    never smoker mod etoh  no drugs in past   FH mother copd  Dad HTN,  from metastatic cancer unknown cause

## 2024-08-01 NOTE — PHYSICAL EXAM
[General Appearance - Alert] : alert [General Appearance - In No Acute Distress] : in no acute distress [Sclera] : the sclera and conjunctiva were normal [Extraocular Movements] : extraocular movements were intact [Neck Appearance] : the appearance of the neck was normal [Neck Cervical Mass (___cm)] : no neck mass was observed [Jugular Venous Distention Increased] : there was no jugular-venous distention [Auscultation Breath Sounds / Voice Sounds] : lungs were clear to auscultation bilaterally [Heart Rate And Rhythm] : heart rate was normal and rhythm regular [Heart Sounds] : normal S1 and S2 [Heart Sounds Gallop] : no gallops [Murmurs] : no murmurs [Heart Sounds Pericardial Friction Rub] : no pericardial rub [Edema] : there was no peripheral edema [Bowel Sounds] : normal bowel sounds [Abdomen Soft] : soft [Abdomen Tenderness] : non-tender [Costovertebral Angle Tenderness] : no CVA tenderness [Abdomen Mass (___ Cm)] : no abdominal mass palpated [Musculoskeletal - Swelling] : no joint swelling [Nail Clubbing] : no clubbing  or cyanosis of the fingernails [Motor Tone] : muscle strength and tone were normal [] : no rash [No Focal Deficits] : no focal deficits [Oriented To Time, Place, And Person] : oriented to person, place, and time [Affect] : the affect was normal

## 2024-08-01 NOTE — ASU PATIENT PROFILE, ADULT - PRO INTERPRETER NEED 2
Physical Therapy      Patient Name:  Msia Barajas   MRN:  [id number]    Patient not seen today secondary to  (pt. worked with OT and wanted to hold on PT until tomorrow). Will follow-up tomorrow.    Uche Carlson, PT  8/1/2024       English

## 2024-08-01 NOTE — ASSESSMENT
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 he has no symptoms today nor did he have when he went to urology  if there is no plan for a urologic procuedre he does not need treatment  if he does go for a urologic procuedre a pre-op urine cx should be sent at that tiome and based on culture results get bax targeting that bacteria fosfomycin would not be used for pseudomonas he could take cipro after discussion with his cardiologist and a baseline ekg to assess QTc   Plan: no need for abx at this time do not send routine urine culture if not going for a procure and no symptoms  send urine cx prior to procedure  treat based on that urine cx

## 2024-10-01 ENCOUNTER — APPOINTMENT (OUTPATIENT)
Dept: CT IMAGING | Facility: HOSPITAL | Age: 74
End: 2024-10-01
Payer: MEDICARE

## 2024-10-01 ENCOUNTER — OUTPATIENT (OUTPATIENT)
Dept: OUTPATIENT SERVICES | Facility: HOSPITAL | Age: 74
LOS: 1 days | End: 2024-10-01
Payer: MEDICARE

## 2024-10-01 DIAGNOSIS — Z98.890 OTHER SPECIFIED POSTPROCEDURAL STATES: Chronic | ICD-10-CM

## 2024-10-01 DIAGNOSIS — Z98.1 ARTHRODESIS STATUS: Chronic | ICD-10-CM

## 2024-10-01 DIAGNOSIS — Z00.8 ENCOUNTER FOR OTHER GENERAL EXAMINATION: ICD-10-CM

## 2024-10-01 PROCEDURE — 70486 CT MAXILLOFACIAL W/O DYE: CPT | Mod: 26,MH

## 2024-10-01 PROCEDURE — 70450 CT HEAD/BRAIN W/O DYE: CPT | Mod: 26,MH

## 2024-11-20 PROCEDURE — 70450 CT HEAD/BRAIN W/O DYE: CPT | Mod: MH

## 2024-11-20 PROCEDURE — 70486 CT MAXILLOFACIAL W/O DYE: CPT | Mod: MH

## 2024-12-18 NOTE — PHYSICAL THERAPY INITIAL EVALUATION ADULT - ASR EQUIP NEEDS DISCH PT EVAL
----- Message from Cornelia BORGES sent at 12/18/2024  9:04 AM EST -----  Regarding: care gap request  12/18/24 9:04 AM    Hello, our patient attached above has had Diabetic Eye Exam completed/performed. Please assist in updating the patient chart by making an External outreach to [facility] facility located in [place], [place]. The date of service is sometime earlier this year, unfortunately pt is unsure of exact date. .    Thank you so much,  LEYDA Mcknight   pt owns rolling walker

## 2025-01-22 ENCOUNTER — APPOINTMENT (OUTPATIENT)
Dept: ORTHOPEDIC SURGERY | Facility: CLINIC | Age: 75
End: 2025-01-22
Payer: MEDICARE

## 2025-01-22 VITALS — WEIGHT: 235 LBS | HEIGHT: 77 IN | BODY MASS INDEX: 27.75 KG/M2

## 2025-01-22 DIAGNOSIS — M43.16 SPONDYLOLISTHESIS, LUMBAR REGION: ICD-10-CM

## 2025-01-22 PROCEDURE — 99214 OFFICE O/P EST MOD 30 MIN: CPT

## 2025-01-22 PROCEDURE — 72100 X-RAY EXAM L-S SPINE 2/3 VWS: CPT

## 2025-03-13 NOTE — ED CDU PROVIDER DISPOSITION NOTE - NS_EDPROVIDERDISPOUSERTYPE_ED_A_ED
Pulmonary Clinic follow up    Date of Service: 3/13/2025    Reason for follow up:  Follow-Up (Mild intermittent reactive airway disease without complication. Last seen 11/05/24/), Results ( PFT 11/19/24), and Medication Management (Trial: advair)    History of Present Illness:     Uday Osman is a 73 y.o. male being evaluated in clinic today for asthma follow up.  PMH includes HLD and elevated CAC score.  Patient is on statin therapy and follows with cardiology.  Patient was last seen by Dr. Henderson in November 2024.  Patient's PFTs were insignificant, with a slight restriction suggestive that it was likely 2/2 elevated BMI.  Patient has not responded to the Advair inhaler that was given to the last appointment.  Patient states that he read his PFT report, that suggested he lose weight.  Since then, patient has lost 25 pounds with the assistance of medication through his PCP.  Patient reports his exertional dyspnea has resolved, and he is back to his activities that include gym every other day, hiking, walking.  Patient now does recover less than 1 minute if he does get short of breath, which only occurs with more significant exercise or exertion.  Patient is agreeable to albuterol prescription for emergencies, but can follow with his PCP for any further needs unless he prefers another pulmonary evaluation which I do not foresee that he needs.    Pulmonary Hx: smoking history of 25 pack years, quit in 1990    MMRC Grade:   0- Breathless only during strenuous exercise  1- Short of breath when hurrying or going up a small hill  2- Walks slower than friends due to breathlessness, has to stop at own pace  3- Stops to catch breath on level ground after 100m  4- Breathless with ambulating around house or ADLs     Review of Systems   Constitutional:  Negative for chills, fever and weight loss.   Respiratory:  Negative for cough, hemoptysis, sputum production, shortness of breath, wheezing and stridor.     Cardiovascular:  Negative for chest pain, palpitations and leg swelling.   Gastrointestinal:  Negative for heartburn.   Musculoskeletal:  Negative for joint pain.   Skin:  Negative for rash.   Neurological:  Negative for dizziness.   Psychiatric/Behavioral:  Negative for depression.        Current Outpatient Medications on File Prior to Visit   Medication Sig Dispense Refill    atorvastatin (LIPITOR) 80 MG tablet Take 1 Tablet by mouth every evening. 100 Tablet 3    fluticasone-salmeterol (ADVAIR) 100-50 MCG/ACT AEROSOL POWDER, BREATH ACTIVATED Inhale 1 Puff 2 times a day. 1 Each 11    Probiotic Product (PROBIOTIC DAILY PO) Take  by mouth.      Omega-3 Fatty Acids (FISH OIL) 1000 MG Cap capsule Take 1,000 mg by mouth every day.      Cholecalciferol (VITAMIN D3 PO) Take 1 Tablet by mouth every day.      cetirizine (ZYRTEC) 10 MG Tab Take 10 mg by mouth every day.      ascorbic acid (ASCORBIC ACID) 500 MG TABS Take 1,500 mg by mouth every day.       No current facility-administered medications on file prior to visit.     Social History     Tobacco Use    Smoking status: Former     Current packs/day: 0.00     Average packs/day: 1 pack/day for 25.3 years (25.3 ttl pk-yrs)     Types: Cigarettes     Start date: 10/1/1964     Quit date: 1990     Years since quittin.2    Smokeless tobacco: Never   Vaping Use    Vaping status: Never Used   Substance Use Topics    Alcohol use: Yes     Comment: very occasional    Drug use: Yes     Types: Marijuana     Comment: occasional Cannabis edible rarely      Past Medical History:   Diagnosis Date    Diverticulosis of large intestine without hemorrhage 2018    Elevated liver enzymes 2022    Fever and chills 2023    Fungus infection 2021    High cholesterol     Left anterior knee pain 2021    Paronychia of fourth toe 2023    Prostatitis 2023    Reactive airway disease 2024    Shortness of breath     Snoring     SOB (shortness of  "breath) 07/03/2024    Urinary bladder disorder March 2020    Urinary Retention    UTI due to extended-spectrum beta lactamase (ESBL) producing Escherichia coli 01/06/2023    Ventral hernia 04/22/2015    ICD-10 transition     Past Surgical History:   Procedure Laterality Date    CA TRANSURETHRAL ELEC-SURG PROSTATECTOM  12/6/2022    Procedure: BIPOLAR TRANSURETHRAL RESECTION OF PROSTATE;  Surgeon: Phillip Higgins M.D.;  Location: SURGERY [facility];  Service: Urology    VENTRAL HERNIA REPAIR  04/22/2015    Performed by Sánchez Mayorga M.D. at SURGERY [facility]    COLONOSCOPY  01/01/2013    X 2    OTHER ORTHOPEDIC SURGERY      fx finger/ age 14     Patient has no known allergies.  Family History   Problem Relation Age of Onset    Glaucoma Mother     Diabetes Father     Heart Disease Father     Hypertension Father     Hyperlipidemia Father     Stroke Father     Heart Attack Neg Hx      Ambulatory Vitals  Encounter Vitals  Blood Pressure : 124/78  Pulse: 70  Respiration: 16  Pulse Oximetry: 97 %  Weight: 88.9 kg (196 lb)  Height: 174 cm (5' 8.5\")  BMI (Calculated): 29.37     Physical Exam  Constitutional:       General: He is not in acute distress.  HENT:      Head: Normocephalic.      Nose: Nose normal.      Mouth/Throat:      Mouth: Mucous membranes are moist.   Eyes:      Conjunctiva/sclera: Conjunctivae normal.   Cardiovascular:      Rate and Rhythm: Normal rate and regular rhythm.      Heart sounds: No murmur heard.  Pulmonary:      Effort: Pulmonary effort is normal. No respiratory distress.      Breath sounds: Normal breath sounds. No wheezing.   Abdominal:      General: There is no distension.   Musculoskeletal:      Right lower leg: No edema.      Left lower leg: No edema.   Skin:     General: Skin is warm and dry.      Capillary Refill: Capillary refill takes less than 2 seconds.      Nails: There is no clubbing.   Neurological:      General: No focal deficit present.      Mental Status: He is alert " and oriented to person, place, and time.   Psychiatric:         Mood and Affect: Mood normal.       Results:    PFT 11/19/2024:    Interpretation:  FVC 4.62 L, 118%  FEV1 3.54 L, 119%  FEV1/FVC 77%  TLC 8.12 L, 120%  ERV 0.59 L, 51%  DLCO 28.53, 117%  Flow volume loops: Normal     Spirometry demonstrates no airflow obstruction.  There is no bronchodilator response.  Lung volumes are normal except for an isolated reduction in ERV which indicates a component of extrapulmonary restriction due to BMI of 32.7.  Gas exchange is normal.    CT chest 8/21/2024:    IMPRESSION:     1. No acute process seen.  2. Coronary artery calcifications.  3. Scoliosis.    Echocardiogram 7/25/2024:    CONCLUSIONS  No prior study is available for comparison.   Normal left ventricular systolic function.  The left ventricular ejection fraction is visually estimated to be 60%.  No significant valvular abnormalities.     Vaccinations:    Covid: complete 2024  Flu: complete 2024  Pneumonia: complete  RSV: complete      Assessment & Plan  Mild intermittent reactive airway disease without complication  Albuterol as needed  PFT normal  PCP ok to follow up, patient does not need pulmonary at this time    Orders:    albuterol 108 (90 Base) MCG/ACT Aero Soln inhalation aerosol; Inhale 2 Puffs every 6 hours as needed for Shortness of Breath.    Exertional dyspnea  Resolved with weight loss          Return if symptoms worsen or fail to improve, for f/u as needed, stable from a pulmonary standpoint and can be managed by PCP.     This note was generated using voice recognition software which has a chance of producing errors of grammar and possibly content.  I have made every reasonable attempt to find and correct any obvious errors, but it should be expected that some may not be found prior to finalization of this note.    Time spent in record review prior to patient arrival, reviewing results, and in face-to-face encounter totaled 31 min, excluding any  procedures if performed.    Kyle RAMOS  [facility]   Attending Attestation (For Attendings USE Only)...

## 2025-03-29 NOTE — ED PROVIDER NOTE - CPE EDP ENMT NORM
[Use of independent historian: [ enter independent historian's relationship to patient ] :____] : As the patient was unable to provide a complete and reliable history, I obtained clinical history from the patient's [unfilled] [FreeTextEntry1] : # Atopic dermatitis, chronic, flare - ddx for thicker lesion includes mastocytoma although less likely given other similar lesions resolved with hydrocortisone - Start hydrocortisone 2.5% ointment BID to AAs PRN roughness. SED including atrophy, dyspigmentation, telangiectasias, striae. Proper use reviewed including only using to affected area and avoidance of prolonged use. - Start mometasone 0.1% ointment BID to AAs on the body PRN roughness - Encouraged liberal vaseline around mouth and avoidance of wipes  # Xerosis cutis - Dry skincare reviewed, handout provided  RTC 3 mos or PRN normal...

## 2025-04-07 NOTE — OCCUPATIONAL THERAPY INITIAL EVALUATION ADULT - WEIGHT-BEARING RESTRICTIONS: SIT/STAND, REHAB EVAL
membrane normal.      Nose: Congestion and rhinorrhea present.      Mouth/Throat:      Mouth: Mucous membranes are moist.   Eyes:      Pupils: Pupils are equal, round, and reactive to light.   Cardiovascular:      Rate and Rhythm: Normal rate and regular rhythm.      Pulses: Normal pulses.      Heart sounds: Normal heart sounds.   Pulmonary:      Effort: Pulmonary effort is normal.      Breath sounds: Normal breath sounds.   Abdominal:      General: Abdomen is flat.      Palpations: Abdomen is soft.   Musculoskeletal:         General: Normal range of motion.      Cervical back: Normal range of motion and neck supple.   Skin:     General: Skin is warm and dry.      Capillary Refill: Capillary refill takes less than 2 seconds.   Neurological:      Mental Status: He is alert and oriented to person, place, and time.   Psychiatric:         Mood and Affect: Mood normal.         Behavior: Behavior normal.         An electronic signature was used to authenticate this note.    --MONTSERRAT Patterson - CNP    weight-bearing as tolerated

## 2025-05-18 NOTE — ED PROVIDER NOTE - NSICDXFAMILYHX_GEN_ALL_CORE_FT
"      /64 (BP Location: Left arm, Patient Position: Sitting)   Pulse 100   Temp 98.8 °F (37.1 °C) (Oral)   Ht 4' 10" (1.473 m)   Wt 83.9 kg (184 lb 15.5 oz)   LMP  (LMP Unknown)   SpO2 95%   BMI 38.66 kg/m²       ===========              Beth Dominguez is a 83 y.o. female     here with her daughter for ongoing coughing.  Saw urgent care nurse practitioner about 4 days ago and was prescribed Tessalon Perles which she describes is not helping any.  She is also recommended some over-the-counter medications.  Symptoms have not improved.  She is hydrating well.        Patient queried and denies any further complaints      Problem List[1]    SURGICAL AND MEDICAL HISTORY: updated and reviewed.  Past Surgical History:   Procedure Laterality Date    BREAST BIOPSY Left     COLONOSCOPY  2011     ALLERGIES updated and reviewed.  Review of patient's allergies indicates:  No Known Allergies    CURRENT OUTPATIENT MEDICATIONS updated and reviewed  Current Medications[2]    Review of Systems   Constitutional:  Negative for activity change, appetite change, chills, diaphoresis, fatigue, fever and unexpected weight change.   HENT:  Positive for congestion. Negative for ear discharge, ear pain, facial swelling, hearing loss, nosebleeds, postnasal drip, rhinorrhea, sinus pressure, sneezing, sore throat, tinnitus, trouble swallowing and voice change.    Eyes:  Negative for photophobia, pain, discharge, redness, itching and visual disturbance.   Respiratory:  Positive for cough. Negative for chest tightness, shortness of breath and wheezing.    Cardiovascular:  Negative for chest pain, palpitations and leg swelling.   Gastrointestinal:  Negative for abdominal distention, abdominal pain, anal bleeding, blood in stool, constipation, diarrhea, nausea, rectal pain and vomiting.   Endocrine: Negative for cold intolerance, heat intolerance, polydipsia, polyphagia and polyuria.   Genitourinary:  Negative for difficulty urinating, " "dysuria and flank pain.   Musculoskeletal:  Negative for arthralgias, back pain, joint swelling, myalgias and neck pain.   Skin:  Negative for rash.   Neurological:  Negative for dizziness, tremors, seizures, syncope, speech difficulty, weakness, light-headedness, numbness and headaches.   Psychiatric/Behavioral:  Negative for behavioral problems, confusion, decreased concentration, dysphoric mood, sleep disturbance and suicidal ideas. The patient is not nervous/anxious and is not hyperactive.        /64 (BP Location: Left arm, Patient Position: Sitting)   Pulse 100   Temp 98.8 °F (37.1 °C) (Oral)   Ht 4' 10" (1.473 m)   Wt 83.9 kg (184 lb 15.5 oz)   LMP  (LMP Unknown)   SpO2 95%   BMI 38.66 kg/m²   Physical Exam  Vitals and nursing note reviewed.   Constitutional:       General: She is not in acute distress.     Appearance: Normal appearance. She is well-developed. She is not ill-appearing or toxic-appearing.   HENT:      Head: Normocephalic and atraumatic.      Right Ear: Tympanic membrane, ear canal and external ear normal.      Left Ear: Tympanic membrane, ear canal and external ear normal.      Nose: Nose normal.      Mouth/Throat:      Lips: Pink.      Mouth: Mucous membranes are moist.      Pharynx: Posterior oropharyngeal erythema present. No oropharyngeal exudate.   Eyes:      General: No scleral icterus.        Right eye: No discharge.         Left eye: No discharge.      Extraocular Movements: Extraocular movements intact.      Conjunctiva/sclera: Conjunctivae normal.   Cardiovascular:      Rate and Rhythm: Normal rate and regular rhythm.      Pulses: Normal pulses.      Heart sounds: Normal heart sounds. No murmur heard.  Pulmonary:      Effort: Pulmonary effort is normal. No respiratory distress.      Breath sounds: Normal breath sounds. No wheezing or rales.   Musculoskeletal:      Cervical back: Normal range of motion and neck supple. No rigidity or tenderness.   Lymphadenopathy:      " Cervical: No cervical adenopathy.   Skin:     General: Skin is warm and dry.   Neurological:      Mental Status: She is alert. Mental status is at baseline.   Psychiatric:         Mood and Affect: Mood normal.         Behavior: Behavior normal. Behavior is cooperative.         ASSESSMENT/PLAN    Diagnoses and all orders for this visit:    Acute bacterial bronchitis    Other orders  -     azithromycin (Z-LILIAN) 250 MG tablet; Take 2 tablets by mouth on day 1; Take 1 tablet by mouth on days 2-5  -     fluconazole (DIFLUCAN) 150 MG Tab; Take 1 tablet (150 mg total) by mouth once daily. Prn yeast vaginitis for 1 day        Hydrate, rest, Mucinex Expectorant as directed for thinning out the mucus; or MucinexDM if with significant cough and mucus.  Zyrtec, Xyzal, Allegra or Claritin. (Would lean towards Allegra which may be a bit more effective than claritin and will not cause sedation as Xyzal or Zyrtec may.)  Nasal saline spray such as [place] brand as needed.  Flonase or Nasonex nasal spray after the nasal saline.  Warm salt water gargles and warm liquids may help soothe a sore throat better than cool liquids.  Tylenol as directed as needed for fever, body aches, headaches.   All are OTC  Most of all, stay well-hydrated.      Most recent some lab results reviewed with patient.  Any new prescription medications gone over in detail including reason for taking the medication, most common possible side effects and possible costs, etcetera.    Chronic conditions updated. Other than changes or additions as above, cont current medications and maintain follow-up with specialists if indicated.     - Cautioned the patient against excessive use of internet searches for interpreting results before professional review.     Elvis Saldana MD  A dictation device was used to produce this document. Use of such devices sometimes results in grammatical errors or replacement of words that sound similarly.                         [1]    Patient Active Problem List  Diagnosis    Essential hypertension    Type 2 diabetes mellitus with stage 3a chronic kidney disease, without long-term current use of insulin    Mixed hyperlipidemia    CKD (chronic kidney disease) stage 3, GFR 30-59 ml/min    Obesity, diabetes, and hypertension syndrome    Insomnia    Dermatophytosis    Mammogram declined    Severe obesity    Osteopenia    Rash    Chronic insomnia   [2]   Current Outpatient Medications:     ascorbic acid, vitamin C, (VITAMIN C) 500 MG tablet, Take 500 mg by mouth once daily., Disp: , Rfl:     aspirin (ECOTRIN) 81 MG EC tablet, Take 81 mg by mouth once daily., Disp: , Rfl:     atorvastatin (LIPITOR) 20 MG tablet, Take 1 tablet (20 mg total) by mouth once daily., Disp: 90 tablet, Rfl: 3    blood sugar diagnostic (TRUETEST TEST STRIPS) Strp, 1 each by Misc.(Non-Drug; Combo Route) route 2 (two) times daily., Disp: 100 strip, Rfl: 12    calcium carbonate-vitamin D3 600 mg-5 mcg (200 unit) Cap, Take 1 tablet by mouth once daily., Disp: , Rfl:     fish oil-omega-3 fatty acids 300-1,000 mg capsule, Take 1 g by mouth once daily., Disp: , Rfl:     metFORMIN (GLUCOPHAGE) 500 MG tablet, Take 1 tablet (500 mg total) by mouth once daily., Disp: 90 tablet, Rfl: 3    traZODone (DESYREL) 50 MG tablet, Take 1 tablet (50 mg total) by mouth every evening., Disp: 90 tablet, Rfl: 3    triamcinolone (KENALOG) 0.5 % ointment, Apply topically 2 (two) times daily., Disp: 15 g, Rfl: 5    valsartan (DIOVAN) 40 MG tablet, Take 1 tablet (40 mg total) by mouth once daily., Disp: 90 tablet, Rfl: 3    azithromycin (Z-LILIAN) 250 MG tablet, Take 2 tablets by mouth on day 1; Take 1 tablet by mouth on days 2-5, Disp: 6 tablet, Rfl: 0    lancets 30 gauge Misc, 100 lancets by Misc.(Non-Drug; Combo Route) route once daily., Disp: 100 each, Rfl: 3     FAMILY HISTORY:  No pertinent family history in first degree relatives

## 2025-06-27 ENCOUNTER — RX RENEWAL (OUTPATIENT)
Age: 75
End: 2025-06-27

## 2025-07-01 NOTE — ED ADULT TRIAGE NOTE - AS TEMP SITE
Patient no showed to appointment on 06/24/2025 with Dr. Amaro.  Spoke to percy and asked that patient give me a call back.  She asked if appt could be rescheduled.       No show letter #1 sent via certified letter and mail.       oral